# Patient Record
Sex: FEMALE | Race: ASIAN | NOT HISPANIC OR LATINO | Employment: FULL TIME | URBAN - METROPOLITAN AREA
[De-identification: names, ages, dates, MRNs, and addresses within clinical notes are randomized per-mention and may not be internally consistent; named-entity substitution may affect disease eponyms.]

---

## 2017-01-06 ENCOUNTER — ROUTINE PRENATAL (OUTPATIENT)
Dept: OBGYN | Facility: CLINIC | Age: 27
End: 2017-01-06
Payer: MEDICAID

## 2017-01-06 VITALS — WEIGHT: 211 LBS | DIASTOLIC BLOOD PRESSURE: 69 MMHG | BODY MASS INDEX: 34.07 KG/M2 | SYSTOLIC BLOOD PRESSURE: 125 MMHG

## 2017-01-06 DIAGNOSIS — O41.92X0: ICD-10-CM

## 2017-01-06 DIAGNOSIS — Z34.82 ENCOUNTER FOR SUPERVISION OF OTHER NORMAL PREGNANCY IN SECOND TRIMESTER: Primary | ICD-10-CM

## 2017-01-06 PROCEDURE — 90040 PR PRENATAL FOLLOW UP: CPT | Performed by: NURSE PRACTITIONER
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
## 2017-01-06 NOTE — Clinical Note
"Count Your Baby's Movements  Another step to a healthy delivery    A Epic Dress Re Test             Dept: [phone]    How Many Weeks Pregnant? 26w2d      Date to Begin Countin17              How to use this chart    One way for your physician to keep track of your baby's health is by knowing how often the baby moves (or \"kicks\") in your womb.  You can help your physician to do this by using this chart every day.    Every day, you should see how many hours it takes for your baby to move 10 times.  Start in the morning, as soon as you get up.    · First, write down the time your baby moves until you get to 10.  · Check off one box every time your baby moves until you get to 10.  · Write down the time you finished counting in the last column.  · Total how long it took to count up all 10 movements.  · Finally, fill in the box that shows how long this took.  After counting 10 movements, you no longer have to count any more that day.  The next morning, just start counting again as soon as you get up.    What should you call a \"movement\"?  It is hard to say, because it will feel different from one mother to another and from one pregnancy to the next.  The important thing is that you count the movements the same way throughout your pregnancy.  If you have more questions, you should ask your physician.    Count carefully every day!  SAMPLE:  Week 28    How many hours did it take to feel 10 movements?       Start  Time     1     2     3     4     5     6     7     8     9     10   Finish Time   Mon 8:20 ·  ·  ·  ·  ·  ·  ·  ·  ·  ·  11:40                  Sat               Sun                 IMPORTANT: You should contact your physician if it takes more than two hours for you to feel 10 movements.  Each morning, write down the time and start to count the movements of your baby.  Keep track by checking off one box every time you feel one movement.  When you " "have felt 10 \"kicks\", write down the time you finished counting in the last column.  Then fill in the   box (over the check monique) for the number of hours it took.  Be sure to read the complete instructions on the previous page.            "

## 2017-01-31 NOTE — PROGRESS NOTES
S:  Pt is  at 30w2d for Centering session #5.  Pt has no complaints.  Reports good FM.  Denies VB, LOF, RUCs or vaginal DC.    O:  Please see above vitals.        FHTs: 143        Fundal ht: 30        1hr      A:  IUP at 30w2d  Patient Active Problem List    Diagnosis Date Noted   • Amniotic fluid abnormality - RONEL 21.58cm at 22wk 2016   • Encounter for supervision of normal pregnancy in second trimester 2016   • Vitamin D deficiency 2016   • Tobacco use 2016        P:  1.  Declines BTL.          2.  Questions answered.          3.  Encouraged adequate water intake        4.  F/u 2wks Centering Session #6        5.  Continue FKCs.        6.  TDaP offered today        7.  1hr GTT, RPR, H/H wnl - pt notified        8.  F/u US for poly on 2/3 - today at 11am    Centering Topics Reviewed:  1.  Labor  2.  Birth facility  3.  Breathing  4.  Medications for labor & birth  5.  Early labor -- when to call  6.  Kick counts

## 2017-02-03 ENCOUNTER — APPOINTMENT (OUTPATIENT)
Dept: RADIOLOGY | Facility: IMAGING CENTER | Age: 27
End: 2017-02-03
Attending: NURSE PRACTITIONER
Payer: MEDICAID

## 2017-02-03 ENCOUNTER — ROUTINE PRENATAL (OUTPATIENT)
Dept: OBGYN | Facility: CLINIC | Age: 27
End: 2017-02-03
Payer: MEDICAID

## 2017-02-03 VITALS — WEIGHT: 220 LBS | DIASTOLIC BLOOD PRESSURE: 71 MMHG | BODY MASS INDEX: 35.53 KG/M2 | SYSTOLIC BLOOD PRESSURE: 125 MMHG

## 2017-02-03 DIAGNOSIS — Z34.82 ENCOUNTER FOR SUPERVISION OF OTHER NORMAL PREGNANCY IN SECOND TRIMESTER: ICD-10-CM

## 2017-02-03 DIAGNOSIS — O41.92X0: ICD-10-CM

## 2017-02-03 PROCEDURE — 90040 PR PRENATAL FOLLOW UP: CPT | Performed by: PHYSICIAN ASSISTANT

## 2017-02-03 PROCEDURE — 90715 TDAP VACCINE 7 YRS/> IM: CPT | Performed by: PHYSICIAN ASSISTANT

## 2017-02-03 PROCEDURE — 76816 OB US FOLLOW-UP PER FETUS: CPT | Mod: TC | Performed by: NURSE PRACTITIONER

## 2017-02-03 PROCEDURE — 90471 IMMUNIZATION ADMIN: CPT | Performed by: PHYSICIAN ASSISTANT

## 2017-02-03 NOTE — MR AVS SNAPSHOT
Kyara Olmstead Jones   2/3/2017 9:00 AM   Routine Prenatal   MRN: [id number]    Department[facility]  [facility]   Dept Phone:  [phone]    Description:  Female : 1990   Provider:  CEDRIC Lucia           Allergies as of 2/3/2017     No Known Allergies      You were diagnosed with     Encounter for supervision of other normal pregnancy in second trimester   [[id number]]         Vital Signs     Blood Pressure Weight Last Menstrual Period Smoking Status          125/71 mmHg 99.791 kg (220 lb) 2016 Former Smoker        Basic Information     Date Of Birth Sex Race Ethnicity Preferred Language    1990 Female  Non- English      Your appointments     2017  9:00 AM   Group Visit with CENTERING   [facility] ([facility]    [address] [place] [address] [address]  [place] [address]   [phone]            Mar 03, 2017  9:00 AM   Group Visit with CENTERING   [facility]    [address] [place] [address] [address]  [place] [address]   [phone]            Mar 17, 2017  9:00 AM   Group Visit with CENTERING   [facility]    [address] [place] [address] [address]  [place] [address]   [phone]            Mar 31, 2017  9:00 AM   Group Visit with CENTERING   [facility]    [address] [place] [address] [address]  [place] [address]   [phone]              Problem List              ICD-10-CM Priority Class Noted - Resolved    Tobacco use Z72.0   2016 - Present    Vitamin D deficiency E55.9   2016 - Present    Encounter for supervision of normal pregnancy in second trimester Z34.92   2016 - Present    Amniotic fluid abnormality - RONEL 21.58cm at 22wk O41.90X0   2016 - Present      Health Maintenance        Date Due Completion Dates    IMM HEP B VACCINE (1 of 3 - Primary Series) 1990 ---    IMM HEP A VACCINE (1 of 2 - Standard Series) 10/28/1992 ---    IMM HPV VACCINE (1 of 3 - Female 3 Dose Series) 10/28/2002 ---    IMM VARICELLA (CHICKENPOX) VACCINE  (1 of 2 - 2 Dose Adolescent Series) 10/28/2004 ---    IMM DTaP/Tdap/Td Vaccine (1 - Tdap) 10/28/2010 ---    IMM PNEUMOCOCCAL 19-64 (ADULT) MEDIUM RISK SERIES (1 of 1 - PPSV23) 10/28/2010 ---    IMM INFLUENZA (1) 9/1/2016 ---    PAP SMEAR 6/8/2019 6/8/2016            Current Immunizations     Tdap Vaccine 2/3/2017  9:12 AM      Below and/or attached are the medications your provider expects you to take. Review all of your home medications and newly ordered medications with your provider and/or pharmacist. Follow medication instructions as directed by your provider and/or pharmacist. Please keep your medication list with you and share with your provider. Update the information when medications are discontinued, doses are changed, or new medications (including over-the-counter products) are added; and carry medication information at all times in the event of emergency situations     Allergies:  No Known Allergies          Medications  Valid as of: February 03, 2017 - 12:59 PM    Generic Name Brand Name Tablet Size Instructions for use    Cholecalciferol (Cap) Cholecalciferol 1000 UNIT Take 1 Cap by mouth every day.        Prenatal MV-Min-Fe Fum-FA-DHA   Take  by mouth.        .                 Medicines prescribed today were sent to:     None      Medication refill instructions:       If your prescription bottle indicates you have medication refills left, it is not necessary to call your provider’s office. Please contact your pharmacy and they will refill your medication.    If your prescription bottle indicates you do not have any refills left, you may request refills at any time through one of the following ways: The online [employer] system (except Urgent Care), by calling your provider’s office, or by asking your pharmacy to contact your provider’s office with a refill request. Medication refills are processed only during regular business hours and may not be available until the next business day. Your provider may  request additional information or to have a follow-up visit with you prior to refilling your medication.   *Please Note: Medication refills are assigned a new Rx number when refilled electronically. Your pharmacy may indicate that no refills were authorized even though a new prescription for the same medication is available at the pharmacy. Please request the medicine by name with the pharmacy before contacting your provider for a refill.        Other Notes About Your Plan     BELLA Schaeffer Access Code: Activation code not generated  Current MyChart Status: Active

## 2017-02-13 NOTE — PROGRESS NOTES
S:  Pt is  at 32w2d for Centering session #6.  Pt has no complaints. US results d/w pt - need for NST 2x/wk starting today also d/w pt - pt happy with plan.  Reports good FM.  Denies VB, LOF, RUCs or vaginal DC.    O:  Please see above vitals.        FHTs: 134        Fundal ht: 35          A:  IUP at 32w2d  Patient Active Problem List    Diagnosis Date Noted   • Amniotic fluid abnormality - RONEL 21.58cm at 22wk 2016   • Encounter for supervision of normal pregnancy in second trimester 2016   • Vitamin D deficiency 2016   • Tobacco use 2016        P:  1.  Questions answered.          2.  Encouraged adequate water intake        3.  F/u 2wks Centering Session #7        4.  Continue FKCs.          5.  NST 2x/wk and IOL at 39 wk for Poly - start NST today    Centering Topics Reviewed:  1.  The Birth Experience

## 2017-02-17 ENCOUNTER — ROUTINE PRENATAL (OUTPATIENT)
Dept: OBGYN | Facility: CLINIC | Age: 27
End: 2017-02-17
Payer: MEDICAID

## 2017-02-17 VITALS — WEIGHT: 219.8 LBS | SYSTOLIC BLOOD PRESSURE: 122 MMHG | BODY MASS INDEX: 35.49 KG/M2 | DIASTOLIC BLOOD PRESSURE: 68 MMHG

## 2017-02-17 DIAGNOSIS — O41.92X0: ICD-10-CM

## 2017-02-17 DIAGNOSIS — Z34.82 ENCOUNTER FOR SUPERVISION OF OTHER NORMAL PREGNANCY IN SECOND TRIMESTER: ICD-10-CM

## 2017-02-17 LAB
NST ACOUSTIC STIMULATION: NORMAL
NST ACTION NECESSARY: NORMAL
NST ASSESSMENT: NORMAL
NST BASELINE: NORMAL
NST INDICATIONS: NORMAL
NST OTHER DATA: NORMAL
NST READ BY: NORMAL
NST RETURN: NORMAL
NST UTERINE ACTIVITY: NORMAL

## 2017-02-17 PROCEDURE — 90040 PR PRENATAL FOLLOW UP: CPT | Performed by: PHYSICIAN ASSISTANT

## 2017-02-17 PROCEDURE — 59025 FETAL NON-STRESS TEST: CPT | Performed by: PHYSICIAN ASSISTANT

## 2017-02-17 NOTE — MR AVS SNAPSHOT
Kyara Olmstead Jones   2017 9:00 AM   Routine Prenatal   MRN: [id number]    Department[facility]   Dept Phone:  [phone]    Description:  Female : 1990   Provider:  CEDRIC Lucia           Allergies as of 2017     No Known Allergies      You were diagnosed with     Amniotic fluid abnormality, second trimester, not applicable or unspecified fetus   [[id number]]       Encounter for supervision of other normal pregnancy in second trimester   [[id number]]         Vital Signs     Blood Pressure Weight Last Menstrual Period Smoking Status          122/68 mmHg 99.701 kg (219 lb 12.8 oz) 2016 Former Smoker        Basic Information     Date Of Birth Sex Race Ethnicity Preferred Language    1990 Female  Non- English      Your appointments     Mar 03, 2017  9:00 AM   Group Visit with CENTERING   [facility] ([facility])    [address] [place] [address] [address]  [facility] [address]   [phone]            Mar 17, 2017  9:00 AM   Group Visit with CENTERING   [facility] ([facility])    [address] [place] [address] [address]  [place] [address]   [phone]            Mar 31, 2017  9:00 AM   Group Visit with CENTERING   [facility] ([facility])    [address] [place] [address] [address]  [place] [address] [phone]              Problem List              ICD-10-CM Priority Class Noted - Resolved    Tobacco use Z72.0   2016 - Present    Vitamin D deficiency E55.9   2016 - Present    Encounter for supervision of normal pregnancy in second trimester Z34.92   2016 - Present    Amniotic fluid abnormality - RONEL 21.58cm at 22wk O41.90X0   2016 - Present      Health Maintenance        Date Due Completion Dates    IMM HEP B VACCINE (1 of 3 - Primary Series) 1990 ---    IMM HEP A VACCINE (1 of 2 - Standard Series) 10/28/1992 ---    IMM HPV VACCINE (1 of 3 - Female 3 Dose Series) 10/28/2002 ---    IMM VARICELLA (CHICKENPOX) VACCINE (1 of 2 - 2 Dose Adolescent Series)  10/28/2004 ---    IMM PNEUMOCOCCAL 19-64 (ADULT) MEDIUM RISK SERIES (1 of 1 - PPSV23) 10/28/2010 ---    IMM INFLUENZA (1) 9/1/2016 ---    PAP SMEAR 6/8/2019 6/8/2016    IMM DTaP/Tdap/Td Vaccine (2 - Td) 2/3/2027 2/3/2017            Current Immunizations     Tdap Vaccine 2/3/2017  9:12 AM      Below and/or attached are the medications your provider expects you to take. Review all of your home medications and newly ordered medications with your provider and/or pharmacist. Follow medication instructions as directed by your provider and/or pharmacist. Please keep your medication list with you and share with your provider. Update the information when medications are discontinued, doses are changed, or new medications (including over-the-counter products) are added; and carry medication information at all times in the event of emergency situations     Allergies:  No Known Allergies          Medications  Valid as of: February 17, 2017 - 11:42 AM    Generic Name Brand Name Tablet Size Instructions for use    Cholecalciferol (Cap) Cholecalciferol 1000 UNIT Take 1 Cap by mouth every day.        Prenatal MV-Min-Fe Fum-FA-DHA   Take  by mouth.        .                 Medicines prescribed today were sent to:     None      Medication refill instructions:       If your prescription bottle indicates you have medication refills left, it is not necessary to call your provider’s office. Please contact your pharmacy and they will refill your medication.    If your prescription bottle indicates you do not have any refills left, you may request refills at any time through one of the following ways: The online [employer] system (except Urgent Care), by calling your provider’s office, or by asking your pharmacy to contact your provider’s office with a refill request. Medication refills are processed only during regular business hours and may not be available until the next business day. Your provider may request additional information or to  have a follow-up visit with you prior to refilling your medication.   *Please Note: Medication refills are assigned a new Rx number when refilled electronically. Your pharmacy may indicate that no refills were authorized even though a new prescription for the same medication is available at the pharmacy. Please request the medicine by name with the pharmacy before contacting your provider for a refill.        Other Notes About Your Plan     BELLA Schaeffer Access Code: Activation code not generated  Current [employer] Status: Active

## 2017-02-17 NOTE — MR AVS SNAPSHOT
Kyara Olmstead Jones   2017 11:00 AM   Routine Prenatal   MRN: [id number]    [facility]   Dept Phone:  [phone]    Description:  Female : 1990   Provider:  CEDRIC Lucia           Allergies as of 2017     No Known Allergies      You were diagnosed with     Amniotic fluid abnormality, second trimester, not applicable or unspecified fetus   [3116662]         Vital Signs     Last Menstrual Period Smoking Status                2016 Former Smoker          Basic Information     Date Of Birth Sex Race Ethnicity Preferred Language    1990 Female  Non- English      Your appointments     Mar 03, 2017  9:00 AM   [facility] Visit with CENTERING   [facility] ([facility])    [address] [facility] [facility] [address]  [place] [id number] [phone]            Mar 17, 2017  9:00 AM   Group Visit with CENTERING   [facility] ([facility])    [address] [facility] [address]  [place] [address] [phone]            Mar 31, 2017  9:00 AM   Group Visit with CENTERING   [facility] ([facility])    [address] [facility] [address]  [place] [address] [phone]              Problem List              ICD-10-CM Priority Class Noted - Resolved    Tobacco use Z72.0   2016 - Present    Vitamin D deficiency E55.9   2016 - Present    Encounter for supervision of normal pregnancy in second trimester Z34.92   2016 - Present    Amniotic fluid abnormality - RONEL 21.58cm at 22wk O41.90X0   2016 - Present      Health Maintenance        Date Due Completion Dates    IMM HEP B VACCINE (1 of 3 - Primary Series) 1990 ---    IMM HEP A VACCINE (1 of 2 - Standard Series) 10/28/1992 ---    IMM HPV VACCINE (1 of 3 - Female 3 Dose Series) 10/28/2002 ---    IMM VARICELLA (CHICKENPOX) VACCINE (1 of 2 - 2 Dose Adolescent Series) 10/28/2004 ---    IMM PNEUMOCOCCAL 19-64 (ADULT) MEDIUM RISK SERIES (1 of 1 - PPSV23) 10/28/2010 ---    IMM INFLUENZA (1) 2016 ---    PAP SMEAR  6/8/2019 6/8/2016    IMM DTaP/Tdap/Td Vaccine (2 - Td) 2/3/2027 2/3/2017            Results       Current Immunizations     Tdap Vaccine 2/3/2017  9:12 AM      Below and/or attached are the medications your provider expects you to take. Review all of your home medications and newly ordered medications with your provider and/or pharmacist. Follow medication instructions as directed by your provider and/or pharmacist. Please keep your medication list with you and share with your provider. Update the information when medications are discontinued, doses are changed, or new medications (including over-the-counter products) are added; and carry medication information at all times in the event of emergency situations     Allergies:  No Known Allergies          Medications  Valid as of: February 17, 2017 - 11:42 AM    Generic Name Brand Name Tablet Size Instructions for use    Cholecalciferol (Cap) Cholecalciferol 1000 UNIT Take 1 Cap by mouth every day.        Prenatal MV-Min-Fe Fum-FA-DHA   Take  by mouth.        .                 Medicines prescribed today were sent to:     None      Medication refill instructions:       If your prescription bottle indicates you have medication refills left, it is not necessary to call your provider’s office. Please contact your pharmacy and they will refill your medication.    If your prescription bottle indicates you do not have any refills left, you may request refills at any time through one of the following ways: The online [employer] system (except Urgent Care), by calling your provider’s office, or by asking your pharmacy to contact your provider’s office with a refill request. Medication refills are processed only during regular business hours and may not be available until the next business day. Your provider may request additional information or to have a follow-up visit with you prior to refilling your medication.   *Please Note: Medication refills are assigned a new Rx number  when refilled electronically. Your pharmacy may indicate that no refills were authorized even though a new prescription for the same medication is available at the pharmacy. Please request the medicine by name with the pharmacy before contacting your provider for a refill.        Other Notes About Your Plan     BELLA Schaeffer Access Code: Activation code not generated  Current MyChart Status: Active

## 2017-02-21 ENCOUNTER — ROUTINE PRENATAL (OUTPATIENT)
Dept: OBGYN | Facility: CLINIC | Age: 27
End: 2017-02-21
Payer: MEDICAID

## 2017-02-21 DIAGNOSIS — O40.9XX0 POLYHYDRAMNIOS, UNSPECIFIED TRIMESTER, NOT APPLICABLE OR UNSPECIFIED FETUS: ICD-10-CM

## 2017-02-21 LAB
NST ACOUSTIC STIMULATION: NORMAL
NST ACTION NECESSARY: NORMAL
NST ASSESSMENT: NORMAL
NST BASELINE: 130
NST INDICATIONS: NORMAL
NST OTHER DATA: NORMAL
NST READ BY: NORMAL
NST RETURN: NORMAL
NST UTERINE ACTIVITY: NORMAL

## 2017-02-21 PROCEDURE — 59025 FETAL NON-STRESS TEST: CPT | Performed by: NURSE PRACTITIONER

## 2017-02-21 PROCEDURE — 90040 PR PRENATAL FOLLOW UP: CPT | Performed by: NURSE PRACTITIONER

## 2017-02-21 NOTE — MR AVS SNAPSHOT
Kyara Olmstead Jones   2017 2:00 PM   Routine Prenatal   MRN: [id number]    [facility]   Dept Phone:  [phone]    Description:  Female : 1990   Provider:  Roz Song D.N.P.           Allergies as of 2017     No Known Allergies      You were diagnosed with     Polyhydramnios, unspecified trimester, not applicable or unspecified fetus   [[id number]]         Vital Signs     Last Menstrual Period Smoking Status                2016 Former Smoker          Basic Information     Date Of Birth Sex Race Ethnicity Preferred Language    1990 Female  Non- English      Your appointments     2017  2:00 PM   Fetal Non-Stress Test with PC NST   [facility] ([facility])    [address] [place] [address] [address]  [place] [address]   [phone]            2017  2:00 PM   Fetal Non-Stress Test with PC NST   [facility] [address]  [place] [address]   [phone]            Mar 03, 2017  9:00 AM   Group Visit with CENTERING   [facility] ([facility])    [address] [place] [address] [address]  [place] [address]   [phone]            Mar 03, 2017 11:00 AM   Fetal Non-Stress Test with PC NST   [facility] ([facility])    [address] [place] [address] [address]  [place] [address]   [phone]            Mar 17, 2017  9:00 AM   Group Visit with CENTERING   [facility] ([address]  [place] [address]   [phone]            Mar 31, 2017  9:00 AM   Group Visit with CENTERING   [facility] ([facility])    [address] [place] [address] [address]  [place] [address]   [phone]              Problem List              ICD-10-CM Priority Class Noted - Resolved    Tobacco use Z72.0   2016 - Present    Vitamin D deficiency E55.9   2016 - Present    Encounter for supervision of normal pregnancy in second trimester Z34.92   2016 - Present    Amniotic fluid abnormality - RONEL 21.58cm at 22wk O41.90X0   2016 -  Present      Health Maintenance        Date Due Completion Dates    IMM HEP B VACCINE (1 of 3 - Primary Series) 1990 ---    IMM HEP A VACCINE (1 of 2 - Standard Series) 10/28/1992 ---    IMM HPV VACCINE (1 of 3 - Female 3 Dose Series) 10/28/2002 ---    IMM VARICELLA (CHICKENPOX) VACCINE (1 of 2 - 2 Dose Adolescent Series) 10/28/2004 ---    IMM PNEUMOCOCCAL 19-64 (ADULT) MEDIUM RISK SERIES (1 of 1 - PPSV23) 10/28/2010 ---    IMM INFLUENZA (1) 9/1/2016 ---    PAP SMEAR 6/8/2019 6/8/2016    IMM DTaP/Tdap/Td Vaccine (2 - Td) 2/3/2027 2/3/2017            Results     POCT Fetal Nonstress Test      Component    NST Indications    polyhydramnios    NST Baseline    130    NST Uterine Activity    none    NST Acoustic Stimulation    none    NST Assessment    Category one, pos accels, no decels, moderate variability.    NST Action Necessary    NST Other Data    NST Return    NST Read By                        Current Immunizations     Tdap Vaccine 2/3/2017  9:12 AM      Below and/or attached are the medications your provider expects you to take. Review all of your home medications and newly ordered medications with your provider and/or pharmacist. Follow medication instructions as directed by your provider and/or pharmacist. Please keep your medication list with you and share with your provider. Update the information when medications are discontinued, doses are changed, or new medications (including over-the-counter products) are added; and carry medication information at all times in the event of emergency situations     Allergies:  No Known Allergies          Medications  Valid as of: February 21, 2017 -  3:27 PM    Generic Name Brand Name Tablet Size Instructions for use    Cholecalciferol (Cap) Cholecalciferol 1000 UNIT Take 1 Cap by mouth every day.        Prenatal MV-Min-Fe Fum-FA-DHA   Take  by mouth.        .                 Medicines prescribed today were sent to:     None      Medication refill instructions:          If your prescription bottle indicates you have medication refills left, it is not necessary to call your provider’s office. Please contact your pharmacy and they will refill your medication.    If your prescription bottle indicates you do not have any refills left, you may request refills at any time through one of the following ways: The online [employer] system (except Urgent Care), by calling your provider’s office, or by asking your pharmacy to contact your provider’s office with a refill request. Medication refills are processed only during regular business hours and may not be available until the next business day. Your provider may request additional information or to have a follow-up visit with you prior to refilling your medication.   *Please Note: Medication refills are assigned a new Rx number when refilled electronically. Your pharmacy may indicate that no refills were authorized even though a new prescription for the same medication is available at the pharmacy. Please request the medicine by name with the pharmacy before contacting your provider for a refill.        Other Notes About Your Plan     BELLA Saucedo           [employer] Access Code: Activation code not generated  Current [employer] Status: Active

## 2017-02-24 ENCOUNTER — ROUTINE PRENATAL (OUTPATIENT)
Dept: OBGYN | Facility: CLINIC | Age: 27
End: 2017-02-24
Payer: MEDICAID

## 2017-02-24 DIAGNOSIS — O40.3XX0 POLYHYDRAMNIOS, THIRD TRIMESTER, NOT APPLICABLE OR UNSPECIFIED FETUS: ICD-10-CM

## 2017-02-24 PROCEDURE — 59025 FETAL NON-STRESS TEST: CPT | Performed by: OBSTETRICS & GYNECOLOGY

## 2017-02-24 NOTE — MR AVS SNAPSHOT
Kyara Olmstead Jones   2017 2:00 PM   Routine Prenatal   MRN: [id number]    [facility]   Dept Phone:  [phone]    Description:  Female : 1990   Provider:  Keke Huntley M.D.           Allergies as of 2017     No Known Allergies      You were diagnosed with     Polyhydramnios, third trimester, not applicable or unspecified fetus   [[id number]]         Vital Signs     Last Menstrual Period Smoking Status                2016 Former Smoker          Basic Information     Date Of Birth Sex Race Ethnicity Preferred Language    1990 Female  Non- English      Your appointments     2017  2:00 PM   Fetal Non-Stress Test with PC NST   [facility] ([facility])    [address] [place] [address] [address]  [place] [address]   [phone]            Mar 03, 2017  9:00 AM   Group Visit with CENTERING   [facility] ([facility])    [address] [place] [address] [address]  [place] [address]   [phone]            Mar 03, 2017 11:00 AM   Fetal Non-Stress Test with PC NST   [facility] ([facility])    [address] [place] [address] [address]  [place] [address]   [phone]            Mar 17, 2017  9:00 AM   Group Visit with CENTERING   [facility] ([facility])    [address] [place] [address] [address]  [place] [address]   [phone]            Mar 31, 2017  9:00 AM   Group Visit with CENTERING   [facility] ([facility])    [address] [place] [address] [address]  [place] [address]   [phone]              Problem List              ICD-10-CM Priority Class Noted - Resolved    Tobacco use Z72.0   2016 - Present    Vitamin D deficiency E55.9   2016 - Present    Encounter for supervision of normal pregnancy in second trimester Z34.92   2016 - Present    Amniotic fluid abnormality - RONEL 21.58cm at 22wk O41.90X0   2016 - Present      Health Maintenance        Date Due Completion Dates    IMM HEP B VACCINE (1 of 3 - Primary Series) 1990 ---    IMM HEP A VACCINE (1 of 2 - Standard Series)  10/28/1992 ---    IMM HPV VACCINE (1 of 3 - Female 3 Dose Series) 10/28/2002 ---    IMM VARICELLA (CHICKENPOX) VACCINE (1 of 2 - 2 Dose Adolescent Series) 10/28/2004 ---    IMM PNEUMOCOCCAL 19-64 (ADULT) MEDIUM RISK SERIES (1 of 1 - PPSV23) 10/28/2010 ---    IMM INFLUENZA (1) 9/1/2016 ---    PAP SMEAR 6/8/2019 6/8/2016    IMM DTaP/Tdap/Td Vaccine (2 - Td) 2/3/2027 2/3/2017            Results     POCT NST      Component    NST Indications    Polyhydramnios    NST Baseline    120s    NST Uterine Activity    none    NST Acoustic Stimulation    NST Assessment    Cat 1    NST Action Necessary    NST Other Data    NST Return    twice weekly    NST Read By    Yuko                        Current Immunizations     Tdap Vaccine 2/3/2017  9:12 AM      Below and/or attached are the medications your provider expects you to take. Review all of your home medications and newly ordered medications with your provider and/or pharmacist. Follow medication instructions as directed by your provider and/or pharmacist. Please keep your medication list with you and share with your provider. Update the information when medications are discontinued, doses are changed, or new medications (including over-the-counter products) are added; and carry medication information at all times in the event of emergency situations     Allergies:  No Known Allergies          Medications  Valid as of: February 24, 2017 -  4:17 PM    Generic Name Brand Name Tablet Size Instructions for use    Cholecalciferol (Cap) Cholecalciferol 1000 UNIT Take 1 Cap by mouth every day.        Prenatal MV-Min-Fe Fum-FA-DHA   Take  by mouth.        .                 Medicines prescribed today were sent to:     None      Medication refill instructions:       If your prescription bottle indicates you have medication refills left, it is not necessary to call your provider’s office. Please contact your pharmacy and they will refill your medication.    If your prescription bottle  indicates you do not have any refills left, you may request refills at any time through one of the following ways: The online [employer] system (except Urgent Care), by calling your provider’s office, or by asking your pharmacy to contact your provider’s office with a refill request. Medication refills are processed only during regular business hours and may not be available until the next business day. Your provider may request additional information or to have a follow-up visit with you prior to refilling your medication.   *Please Note: Medication refills are assigned a new Rx number when refilled electronically. Your pharmacy may indicate that no refills were authorized even though a new prescription for the same medication is available at the pharmacy. Please request the medicine by name with the pharmacy before contacting your provider for a refill.        Other Notes About Your Plan     BELLA Saucedo           [employer] Access Code: Activation code not generated  Current [employer] Status: Active

## 2017-02-28 ENCOUNTER — ROUTINE PRENATAL (OUTPATIENT)
Dept: OBGYN | Facility: CLINIC | Age: 27
End: 2017-02-28
Payer: MEDICAID

## 2017-02-28 DIAGNOSIS — O41.92X0: ICD-10-CM

## 2017-02-28 PROCEDURE — 59025 FETAL NON-STRESS TEST: CPT | Performed by: OBSTETRICS & GYNECOLOGY

## 2017-02-28 NOTE — MR AVS SNAPSHOT
Kyara Patelr   2017 11:00 AM   Routine Prenatal   MRN: [id number]    [facility]   Dept Phone:  [phone]    Description:  Female : 1990   Provider:  SHELDON CHING           Allergies as of 2017     No Known Allergies      You were diagnosed with     Amniotic fluid abnormality, second trimester, not applicable or unspecified fetus   [[id number]]         Vital Signs     Last Menstrual Period Smoking Status                2016 Former Smoker          Basic Information     Date Of Birth Sex Race Ethnicity Preferred Language    1990 Female  Non- English      Your appointments     Mar 03, 2017  9:00 AM   Group Visit with CENTERING   [facility] ([facility])    [address] [place] [address] [address]  [place] [address]   [phone]            Mar 03, 2017 11:00 AM   Fetal Non-Stress Test with SHELDON CHING   [facility] ([facility])    [address] [place] [address] [address]  [place] [address]   [phone]            Mar 17, 2017  9:00 AM   Group Visit with CENTERING   [facility] ([facility])    [address] [place] [address] [address]  [place] [address]   [phone]            Mar 31, 2017  9:00 AM   Group Visit with CENTERING   [facility] ([facility])    [address] [place] [address] [address]  [place] [address]   [phone]              Problem List              ICD-10-CM Priority Class Noted - Resolved    Tobacco use Z72.0   2016 - Present    Vitamin D deficiency E55.9   2016 - Present    Encounter for supervision of normal pregnancy in second trimester Z34.92   2016 - Present    Amniotic fluid abnormality - RONEL 21.58cm at 22wk O41.90X0   2016 - Present      Health Maintenance        Date Due Completion Dates    IMM HEP B VACCINE (1 of 3 - Primary Series) 1990 ---    IMM HEP A VACCINE (1 of 2 - Standard Series) 10/28/1992 ---    IMM HPV VACCINE (1 of 3 - Female 3 Dose Series) 10/28/2002 ---    IMM VARICELLA (CHICKENPOX) VACCINE (1 of 2 - 2 Dose Adolescent Series)  10/28/2004 ---    IMM PNEUMOCOCCAL 19-64 (ADULT) MEDIUM RISK SERIES (1 of 1 - PPSV23) 10/28/2010 ---    IMM INFLUENZA (1) 9/1/2016 ---    PAP SMEAR 6/8/2019 6/8/2016    IMM DTaP/Tdap/Td Vaccine (2 - Td) 2/3/2027 2/3/2017            Results       Current Immunizations     Tdap Vaccine 2/3/2017  9:12 AM      Below and/or attached are the medications your provider expects you to take. Review all of your home medications and newly ordered medications with your provider and/or pharmacist. Follow medication instructions as directed by your provider and/or pharmacist. Please keep your medication list with you and share with your provider. Update the information when medications are discontinued, doses are changed, or new medications (including over-the-counter products) are added; and carry medication information at all times in the event of emergency situations     Allergies:  No Known Allergies          Medications  Valid as of: February 28, 2017 - 11:32 AM    Generic Name Brand Name Tablet Size Instructions for use    Cholecalciferol (Cap) Cholecalciferol 1000 UNIT Take 1 Cap by mouth every day.        Prenatal MV-Min-Fe Fum-FA-DHA   Take  by mouth.        .                 Medicines prescribed today were sent to:     None      Medication refill instructions:       If your prescription bottle indicates you have medication refills left, it is not necessary to call your provider’s office. Please contact your pharmacy and they will refill your medication.    If your prescription bottle indicates you do not have any refills left, you may request refills at any time through one of the following ways: The online [employer] system (except Urgent Care), by calling your provider’s office, or by asking your pharmacy to contact your provider’s office with a refill request. Medication refills are processed only during regular business hours and may not be available until the next business day. Your provider may request additional  information or to have a follow-up visit with you prior to refilling your medication.   *Please Note: Medication refills are assigned a new Rx number when refilled electronically. Your pharmacy may indicate that no refills were authorized even though a new prescription for the same medication is available at the pharmacy. Please request the medicine by name with the pharmacy before contacting your provider for a refill.        Other Notes About Your Plan     BELLA Schaeffer Access Code: Activation code not generated  Current [employer] Status: Active

## 2017-03-03 ENCOUNTER — ROUTINE PRENATAL (OUTPATIENT)
Dept: OBGYN | Facility: CLINIC | Age: 27
End: 2017-03-03
Payer: MEDICAID

## 2017-03-03 VITALS — WEIGHT: 221 LBS | BODY MASS INDEX: 35.69 KG/M2 | DIASTOLIC BLOOD PRESSURE: 74 MMHG | SYSTOLIC BLOOD PRESSURE: 135 MMHG

## 2017-03-03 DIAGNOSIS — Z34.82 ENCOUNTER FOR SUPERVISION OF OTHER NORMAL PREGNANCY IN SECOND TRIMESTER: Primary | ICD-10-CM

## 2017-03-03 DIAGNOSIS — O41.92X0: ICD-10-CM

## 2017-03-03 DIAGNOSIS — O40.3XX0 POLYHYDRAMNIOS, THIRD TRIMESTER, NOT APPLICABLE OR UNSPECIFIED FETUS: ICD-10-CM

## 2017-03-03 PROCEDURE — 90040 PR PRENATAL FOLLOW UP: CPT | Performed by: NURSE PRACTITIONER

## 2017-03-03 PROCEDURE — 59025 FETAL NON-STRESS TEST: CPT | Performed by: NURSE PRACTITIONER

## 2017-03-03 NOTE — MR AVS SNAPSHOT
Kyara Lockeuilar   3/3/2017 8:30 AM   Routine Prenatal   MRN: [id number]    [facility]   Dept Phone:  [phone]    Description:  Female : 1990   Provider:  Lacy Eaton C.N.M.           Allergies as of 3/3/2017     No Known Allergies      You were diagnosed with     Polyhydramnios, third trimester, not applicable or unspecified fetus   [[id number]]         Vital Signs     Last Menstrual Period Smoking Status                2016 Former Smoker          Basic Information     Date Of Birth Sex Race Ethnicity Preferred Language    1990 Female  Non- English      Your appointments     Mar 17, 2017  9:00 AM   Group Visit with CENTERING   [facility] ([facility] [place])    [address]  [place] [address] [phone]            Mar 31, 2017  9:00 AM   Group Visit with CENTERING   [facility] ([facility] [place])    [address]  [place] [address] [phone]              Problem List              ICD-10-CM Priority Class Noted - Resolved    Tobacco use Z72.0   2016 - Present    Vitamin D deficiency E55.9   2016 - Present    Encounter for supervision of normal pregnancy in second trimester Z34.92   2016 - Present    Amniotic fluid abnormality - RONEL 21.58cm at 22wk O41.90X0   2016 - Present      Health Maintenance        Date Due Completion Dates    IMM HEP B VACCINE (1 of 3 - Primary Series) 1990 ---    IMM HEP A VACCINE (1 of 2 - Standard Series) 10/28/1992 ---    IMM HPV VACCINE (1 of 3 - Female 3 Dose Series) 10/28/2002 ---    IMM VARICELLA (CHICKENPOX) VACCINE (1 of 2 - 2 Dose Adolescent Series) 10/28/2004 ---    IMM PNEUMOCOCCAL 19-64 (ADULT) MEDIUM RISK SERIES (1 of 1 - PPSV23) 10/28/2010 ---    IMM INFLUENZA (1) 2016 ---    PAP SMEAR 2019    IMM DTaP/Tdap/Td Vaccine (2 - Td) 2/3/2027 2/3/2017            Results     POCT Fetal Nonstress Test      Component    NST Indications    Polyhydramnios    NST Baseline    130s    NST Uterine Activity    Quiet    NST Acoustic Stimulation    None    NST Assessment    Reactive NST Cat I FHTs +accels -decels mod variability    NST Action Necessary    NST Other Data    NST Return    Cont 2x/wk NSTs 2wk Centering    NST Read By    [facility]                        Current Immunizations     Tdap Vaccine 2/3/2017  9:12 AM      Below and/or attached are the medications your provider expects you to take. Review all of your home medications and newly ordered medications with your provider and/or pharmacist. Follow medication instructions as directed by your provider and/or pharmacist. Please keep your medication list with you and share with your provider. Update the information when medications are discontinued, doses are changed, or new medications (including over-the-counter products) are added; and carry medication information at all times in the event of emergency situations     Allergies:  No Known Allergies          Medications  Valid as of: March 03, 2017 - 11:11 AM    Generic Name Brand Name Tablet Size Instructions for use    Cholecalciferol (Cap) Cholecalciferol 1000 UNIT Take 1 Cap by mouth every day.        Prenatal MV-Min-Fe Fum-FA-DHA   Take  by mouth.        .                 Medicines prescribed today were sent to:     None      Medication refill instructions:       If your prescription bottle indicates you have medication refills left, it is not necessary to call your provider’s office. Please contact your pharmacy and they will refill your medication.    If your prescription bottle indicates you do not have any refills left, you may request refills at any time through one of the following ways: The online [employer] system (except Urgent Care), by calling your provider’s office, or by asking your pharmacy to contact your provider’s office with a refill request. Medication refills are processed only during regular business hours and may not be available  until the next business day. Your provider may request additional information or to have a follow-up visit with you prior to refilling your medication.   *Please Note: Medication refills are assigned a new Rx number when refilled electronically. Your pharmacy may indicate that no refills were authorized even though a new prescription for the same medication is available at the pharmacy. Please request the medicine by name with the pharmacy before contacting your provider for a refill.        Other Notes About Your Plan     BELLA Schaeffer Access Code: Activation code not generated  Current [employer]sat Status: Active

## 2017-03-03 NOTE — PROGRESS NOTES
S:  Pt is  at 34w2d for Centering session #7.  No c/o.  Reports good FM.  Denies VB, LOF, RUCs or vaginal DC.    O:  Please see above vitals.        FHTs: 130        Fundal ht: 36        NST obtained: Baseline 130s +accels -decels mod variability        [place]: quiet    A:  IUP at 34w2d  NST reactive Cat I FHTs  Patient Active Problem List    Diagnosis Date Noted   • Amniotic fluid abnormality - RONEL 21.58cm at 22wk 2016   • Encounter for supervision of normal pregnancy in second trimester 2016   • Vitamin D deficiency 2016   • Tobacco use 2016        P:  1.  Questions answered.          2.  Encouraged adequate water intake        3.  GBS @ 35wks        4.  F/u 2wks Centering Session #8        5.  Cont 2x/wk NSTs.     Centering Topics Reviewed:  1.  The 's first days  2.  Planning pediatric care  3.  Caring for your baby  4.  Circumcision  5.  Brothers & sisters  6.  [employer] -- when to call

## 2017-03-03 NOTE — PATIENT INSTRUCTIONS
P:  1.  Questions answered.          2.  Encouraged adequate water intake        3.  GBS @ 35wks        4.  F/u 2wks Centering Session #8        5.  Cont 2x/wk NSTs.     Centering Topics Reviewed:  1.  The 's first days  2.  Planning pediatric care  3.  Caring for your baby  4.  Circumcision  5.  Brothers & sisters  6.   -- when to call

## 2017-03-03 NOTE — MR AVS SNAPSHOT
Kyara Lockeuilar   3/3/2017 9:00 AM   Routine Prenatal   MRN: [id number]    [facility]   Dept Phone:  [phone]    Description:  Female : 1990   Provider:  Lacy Eaton C.N.M.           Allergies as of 3/3/2017     No Known Allergies      You were diagnosed with     Encounter for supervision of other normal pregnancy in second trimester   [[id number]]  -  Primary     Amniotic fluid abnormality, second trimester, not applicable or unspecified fetus   [[id number]]         Vital Signs     Blood Pressure Weight Last Menstrual Period Smoking Status          135/74 mmHg 100.245 kg (221 lb) 2016 Former Smoker        Basic Information     Date Of Birth Sex Race Ethnicity Preferred Language    1990 Female  Non- English      Your appointments     Mar 17, 2017  9:00 AM   Group Visit with CENTERING   [facility] ([facility])    [address]  [place] [id number] [phone]            Mar 31, 2017  9:00 AM   Group Visit with CENTERING   [facility] ([facility])    [address] [place] [address] [address]  [place] [id number] [phone]              Problem List              ICD-10-CM Priority Class Noted - Resolved    Tobacco use Z72.0   2016 - Present    Vitamin D deficiency E55.9   2016 - Present    Encounter for supervision of normal pregnancy in second trimester Z34.92   2016 - Present    Amniotic fluid abnormality - RONEL 21.58cm at 22wk O41.90X0   2016 - Present      Health Maintenance        Date Due Completion Dates    IMM HEP B VACCINE (1 of 3 - Primary Series) 1990 ---    IMM HEP A VACCINE (1 of 2 - Standard Series) 10/28/1992 ---    IMM HPV VACCINE (1 of 3 - Female 3 Dose Series) 10/28/2002 ---    IMM VARICELLA (CHICKENPOX) VACCINE (1 of 2 - 2 Dose Adolescent Series) 10/28/2004 ---    IMM PNEUMOCOCCAL 19-64 (ADULT) MEDIUM RISK SERIES (1 of 1 - PPSV23) 10/28/2010 ---    IMM INFLUENZA (1) 2016 ---    PAP SMEAR  6/8/2019 6/8/2016    IMM DTaP/Tdap/Td Vaccine (2 - Td) 2/3/2027 2/3/2017            Current Immunizations     Tdap Vaccine 2/3/2017  9:12 AM      Below and/or attached are the medications your provider expects you to take. Review all of your home medications and newly ordered medications with your provider and/or pharmacist. Follow medication instructions as directed by your provider and/or pharmacist. Please keep your medication list with you and share with your provider. Update the information when medications are discontinued, doses are changed, or new medications (including over-the-counter products) are added; and carry medication information at all times in the event of emergency situations     Allergies:  No Known Allergies          Medications  Valid as of: March 03, 2017 - 12:41 PM    Generic Name Brand Name Tablet Size Instructions for use    Cholecalciferol (Cap) Cholecalciferol 1000 UNIT Take 1 Cap by mouth every day.        Prenatal MV-Min-Fe Fum-FA-DHA   Take  by mouth.        .                 Medicines prescribed today were sent to:     None      Medication refill instructions:       If your prescription bottle indicates you have medication refills left, it is not necessary to call your provider’s office. Please contact your pharmacy and they will refill your medication.    If your prescription bottle indicates you do not have any refills left, you may request refills at any time through one of the following ways: The online [employer] system (except Urgent Care), by calling your provider’s office, or by asking your pharmacy to contact your provider’s office with a refill request. Medication refills are processed only during regular business hours and may not be available until the next business day. Your provider may request additional information or to have a follow-up visit with you prior to refilling your medication.   *Please Note: Medication refills are assigned a new Rx number when refilled  electronically. Your pharmacy may indicate that no refills were authorized even though a new prescription for the same medication is available at the pharmacy. Please request the medicine by name with the pharmacy before contacting your provider for a refill.        Instructions    P:  1.  Questions answered.          2.  Encouraged adequate water intake        3.  GBS @ 35wks        4.  F/u 2wks Centering Session #8        5.  Cont 2x/wk NSTs.     Centering Topics Reviewed:  1.  The 's first days  2.  Planning pediatric care  3.  Caring for your baby  4.  Circumcision  5.  Brothers & sisters  6.   -- when to call       Other Notes About Your Plan     BELLA Schaeffer Access Code: Activation code not generated  Current [employer] Status: Active

## 2017-03-10 ENCOUNTER — ROUTINE PRENATAL (OUTPATIENT)
Dept: OBGYN | Facility: CLINIC | Age: 27
End: 2017-03-10
Payer: MEDICAID

## 2017-03-10 DIAGNOSIS — O40.9XX0 POLYHYDRAMNIOS, UNSPECIFIED TRIMESTER, NOT APPLICABLE OR UNSPECIFIED FETUS: ICD-10-CM

## 2017-03-10 LAB
NST ACOUSTIC STIMULATION: NO
NST ACTION NECESSARY: NORMAL
NST ASSESSMENT: NORMAL
NST BASELINE: 120
NST INDICATIONS: NORMAL
NST OTHER DATA: NORMAL
NST READ BY: NORMAL
NST RETURN: NORMAL
NST UTERINE ACTIVITY: NORMAL

## 2017-03-10 PROCEDURE — 59025 FETAL NON-STRESS TEST: CPT | Performed by: OBSTETRICS & GYNECOLOGY

## 2017-03-10 NOTE — MR AVS SNAPSHOT
Kyaar Huertalar   3/10/2017 1:30 PM   Routine Prenatal   MRN: [id number]    [facility]   Dept Phone:  [phone]    Description:  Female : 1990   Provider:  Pushpa Eddy M.D.           Allergies as of 3/10/2017     No Known Allergies      You were diagnosed with     Polyhydramnios, unspecified trimester, not applicable or unspecified fetus   [[id number]]         Vital Signs     Last Menstrual Period Smoking Status                2016 Former Smoker          Basic Information     Date Of Birth Sex Race Ethnicity Preferred Language    1990 Female  Non- English      Your appointments     Mar 13, 2017  2:00 PM   Fetal Non-Stress Test with PC NST   [facility])    [address] [place] [address] [address]  [place] [address]   [phone]            Mar 17, 2017  8:00 AM   Fetal Non-Stress Test with PC NST   [facility])    [address] [place] [address] [address]  [place] [address]   [phone]            Mar 17, 2017  9:00 AM   Group Visit with CENTERING   [facility] ([facility] [place])    [address] [place] [address] [address]  [place] [address]   [phone]            Mar 31, 2017  9:00 AM   Group Visit with CENTERING   [facility] [place] ([facility] [place])    [address] [place] [address] [address]  [place] [address]   [phone]              Problem List              ICD-10-CM Priority Class Noted - Resolved    Tobacco use Z72.0   2016 - Present    Vitamin D deficiency E55.9   2016 - Present    Encounter for supervision of normal pregnancy in second trimester Z34.92   2016 - Present    Amniotic fluid abnormality - RONEL 21.58cm at 22wk O41.90X0   2016 - Present      Health Maintenance        Date Due Completion Dates    IMM HEP B VACCINE (1 of 3 - Primary Series) 1990 ---    IMM HEP A VACCINE (1 of 2 - Standard Series) 10/28/1992 ---    IMM HPV VACCINE (1 of 3 - Female 3 Dose Series) 10/28/2002 ---    IMM VARICELLA (CHICKENPOX) VACCINE (1 of 2 - 2 Dose Adolescent  Series) 10/28/2004 ---    IMM PNEUMOCOCCAL 19-64 (ADULT) MEDIUM RISK SERIES (1 of 1 - PPSV23) 10/28/2010 ---    IMM INFLUENZA (1) 9/1/2016 ---    PAP SMEAR 6/8/2019 6/8/2016    IMM DTaP/Tdap/Td Vaccine (2 - Td) 2/3/2027 2/3/2017            Results     POCT Fetal Nonstress Test      Component    NST Indications    poly    NST Baseline    120    NST Uterine Activity    none    NST Acoustic Stimulation    no    NST Assessment    reactive, cat1    NST Action Necessary    NST Other Data    NST Return    NST Read By                        Current Immunizations     Tdap Vaccine 2/3/2017  9:12 AM      Below and/or attached are the medications your provider expects you to take. Review all of your home medications and newly ordered medications with your provider and/or pharmacist. Follow medication instructions as directed by your provider and/or pharmacist. Please keep your medication list with you and share with your provider. Update the information when medications are discontinued, doses are changed, or new medications (including over-the-counter products) are added; and carry medication information at all times in the event of emergency situations     Allergies:  No Known Allergies          Medications  Valid as of: March 10, 2017 -  4:20 PM    Generic Name Brand Name Tablet Size Instructions for use    Cholecalciferol (Cap) Cholecalciferol 1000 UNIT Take 1 Cap by mouth every day.        Prenatal MV-Min-Fe Fum-FA-DHA   Take  by mouth.        .                 Medicines prescribed today were sent to:     None      Medication refill instructions:       If your prescription bottle indicates you have medication refills left, it is not necessary to call your provider’s office. Please contact your pharmacy and they will refill your medication.    If your prescription bottle indicates you do not have any refills left, you may request refills at any time through one of the following ways: The online [employer] system (except Urgent  Care), by calling your provider’s office, or by asking your pharmacy to contact your provider’s office with a refill request. Medication refills are processed only during regular business hours and may not be available until the next business day. Your provider may request additional information or to have a follow-up visit with you prior to refilling your medication.   *Please Note: Medication refills are assigned a new Rx number when refilled electronically. Your pharmacy may indicate that no refills were authorized even though a new prescription for the same medication is available at the pharmacy. Please request the medicine by name with the pharmacy before contacting your provider for a refill.        Other Notes About Your Plan     BELLA Schaeffer Access Code: Activation code not generated  Current [employer] Status: Active

## 2017-03-13 ENCOUNTER — ROUTINE PRENATAL (OUTPATIENT)
Dept: OBGYN | Facility: CLINIC | Age: 27
End: 2017-03-13
Payer: MEDICAID

## 2017-03-13 DIAGNOSIS — O40.3XX0 POLYHYDRAMNIOS, THIRD TRIMESTER, NOT APPLICABLE OR UNSPECIFIED FETUS: ICD-10-CM

## 2017-03-13 PROCEDURE — 59025 FETAL NON-STRESS TEST: CPT | Performed by: OBSTETRICS & GYNECOLOGY

## 2017-03-13 NOTE — MR AVS SNAPSHOT
Kyara Patelr   3/13/2017 2:00 PM   Routine Prenatal   MRN: [id number]    [facility]  [facility]   Dept Phone:  [phone]    Description:  Female : 1990   Provider:  Frank Palomino M.D.           Allergies as of 3/13/2017     No Known Allergies      You were diagnosed with     Polyhydramnios, third trimester, not applicable or unspecified fetus   [[id number]]         Vital Signs     Last Menstrual Period Smoking Status                2016 Former Smoker          Basic Information     Date Of Birth Sex Race Ethnicity Preferred Language    1990 Female  Non- English      Your appointments     Mar 17, 2017  8:00 AM   Fetal Non-Stress Test with PC NST   [facility] ([facility])    [address]  [place] [address] [phone]            Mar 17, 2017  9:00 AM   Group Visit with CENTERING   [facility] ([facility])    [address] [place] [address] [address]  [place] [address] [phone]            Mar 31, 2017  9:00 AM   Group Visit with CENTERING   [facility] ([facility])    [address] [place] [address] [address]  [place] [address] [phone]              Problem List              ICD-10-CM Priority Class Noted - Resolved    Tobacco use Z72.0   2016 - Present    Vitamin D deficiency E55.9   2016 - Present    Encounter for supervision of normal pregnancy in second trimester Z34.92   2016 - Present    Amniotic fluid abnormality - RONEL 21.58cm at 22wk O41.90X0   2016 - Present      Health Maintenance        Date Due Completion Dates    IMM HEP B VACCINE (1 of 3 - Primary Series) 1990 ---    IMM HEP A VACCINE (1 of 2 - Standard Series) 10/28/1992 ---    IMM HPV VACCINE (1 of 3 - Female 3 Dose Series) 10/28/2002 ---    IMM VARICELLA (CHICKENPOX) VACCINE (1 of 2 - 2 Dose Adolescent Series) 10/28/2004 ---    IMM PNEUMOCOCCAL 19-64 (ADULT) MEDIUM RISK SERIES (1 of 1 - PPSV23) 10/28/2010 ---    IMM INFLUENZA (1) 2016 ---    PAP SMEAR  6/8/2019 6/8/2016    IMM DTaP/Tdap/Td Vaccine (2 - Td) 2/3/2027 2/3/2017            Results       Current Immunizations     Tdap Vaccine 2/3/2017  9:12 AM      Below and/or attached are the medications your provider expects you to take. Review all of your home medications and newly ordered medications with your provider and/or pharmacist. Follow medication instructions as directed by your provider and/or pharmacist. Please keep your medication list with you and share with your provider. Update the information when medications are discontinued, doses are changed, or new medications (including over-the-counter products) are added; and carry medication information at all times in the event of emergency situations     Allergies:  No Known Allergies          Medications  Valid as of: March 13, 2017 -  4:17 PM    Generic Name Brand Name Tablet Size Instructions for use    Cholecalciferol (Cap) Cholecalciferol 1000 UNIT Take 1 Cap by mouth every day.        Prenatal MV-Min-Fe Fum-FA-DHA   Take  by mouth.        .                 Medicines prescribed today were sent to:     None      Medication refill instructions:       If your prescription bottle indicates you have medication refills left, it is not necessary to call your provider’s office. Please contact your pharmacy and they will refill your medication.    If your prescription bottle indicates you do not have any refills left, you may request refills at any time through one of the following ways: The online [employer] system (except Urgent Care), by calling your provider’s office, or by asking your pharmacy to contact your provider’s office with a refill request. Medication refills are processed only during regular business hours and may not be available until the next business day. Your provider may request additional information or to have a follow-up visit with you prior to refilling your medication.   *Please Note: Medication refills are assigned a new Rx number when  refilled electronically. Your pharmacy may indicate that no refills were authorized even though a new prescription for the same medication is available at the pharmacy. Please request the medicine by name with the pharmacy before contacting your provider for a refill.        Other Notes About Your Plan     BELLA Schaeffer Access Code: Activation code not generated  Current MyChart Status: Active

## 2017-03-17 ENCOUNTER — HOSPITAL ENCOUNTER (OUTPATIENT)
Facility: MEDICAL CENTER | Age: 27
End: 2017-03-17
Attending: NURSE PRACTITIONER
Payer: MEDICAID

## 2017-03-17 ENCOUNTER — ROUTINE PRENATAL (OUTPATIENT)
Dept: OBGYN | Facility: CLINIC | Age: 27
End: 2017-03-17
Payer: MEDICAID

## 2017-03-17 VITALS — DIASTOLIC BLOOD PRESSURE: 59 MMHG | SYSTOLIC BLOOD PRESSURE: 103 MMHG | WEIGHT: 220 LBS | BODY MASS INDEX: 35.53 KG/M2

## 2017-03-17 DIAGNOSIS — Z34.82 ENCOUNTER FOR SUPERVISION OF OTHER NORMAL PREGNANCY IN SECOND TRIMESTER: Primary | ICD-10-CM

## 2017-03-17 DIAGNOSIS — O41.92X0: ICD-10-CM

## 2017-03-17 DIAGNOSIS — O40.3XX0 POLYHYDRAMNIOS, THIRD TRIMESTER, NOT APPLICABLE OR UNSPECIFIED FETUS: ICD-10-CM

## 2017-03-17 LAB
NST ACOUSTIC STIMULATION: YES
NST ACTION NECESSARY: NORMAL
NST ASSESSMENT: NORMAL
NST BASELINE: 130
NST INDICATIONS: NORMAL
NST OTHER DATA: NORMAL
NST READ BY: NORMAL
NST RETURN: NORMAL
NST UTERINE ACTIVITY: NORMAL

## 2017-03-17 PROCEDURE — 87653 STREP B DNA AMP PROBE: CPT

## 2017-03-17 PROCEDURE — 59025 FETAL NON-STRESS TEST: CPT | Performed by: OBSTETRICS & GYNECOLOGY

## 2017-03-17 PROCEDURE — 90040 PR PRENATAL FOLLOW UP: CPT | Performed by: NURSE PRACTITIONER

## 2017-03-17 NOTE — PROGRESS NOTES
S:  Pt is  at 36w2d for Centering session #8.  No c/o.  Reports good FM.  Denies VB, LOF, RUCs or vaginal DC.    O:  Please see above vitals.        FHTs: 140        Fundal ht: 37        Fetal position: vertex        SVE: FT/50/-2          A:  IUP at 36w2d  Reactive NST today  Patient Active Problem List    Diagnosis Date Noted   • Amniotic fluid abnormality - RONEL 21.58cm at 22wk 2016   • Encounter for supervision of normal pregnancy in second trimester 2016   • Vitamin D deficiency 2016   • Tobacco use 2016        P:  1.  Questions answered.          2.  Encouraged adequate water intake        3.  GBS obtained today.        4.  F/u 1 wk MABEL, then 2wks Centering Session #9/10        5.  Continue 2x/wk NSTs.         6.  Referral placed for IOL @ 39wks.      Centering Topics Reviewed:  1.  Pregnancy to parenting transition  2.  Emotional adjustments  3.  Postpartum depression  4.  Pregnancy -- when to call  5.  Other topics: breastfeeding.

## 2017-03-17 NOTE — PATIENT INSTRUCTIONS
P:  1.  Questions answered.          2.  Encouraged adequate water intake        3.  GBS obtained today.        4.  F/u 1 wk MABEL, then 2wks Centering Session #9/10        5.  Continue 2x/wk NSTs.         6.  Referral placed for IOL @ 39wks.      Centering Topics Reviewed:  1.  Pregnancy to parenting transition  2.  Emotional adjustments  3.  Postpartum depression  4.  Pregnancy -- when to call  5.  Other topics: breastfeeding.

## 2017-03-17 NOTE — MR AVS SNAPSHOT
Kyara Olmstead Jones   3/17/2017 9:00 AM   Routine Prenatal   MRN: [id number]    [facility]   Dept Phone:  [phone]    Description:  Female : 1990   Provider:  Lacy Eaton C.N.M.           Allergies as of 3/17/2017     No Known Allergies      You were diagnosed with     Encounter for supervision of other normal pregnancy in second trimester   [2953638]  -  Primary     Amniotic fluid abnormality, second trimester, not applicable or unspecified fetus   [[id number]]         Vital Signs     Blood Pressure Weight Last Menstrual Period Smoking Status          103/59 mmHg 99.791 kg (220 lb) 2016 Former Smoker        Basic Information     Date Of Birth Sex Race Ethnicity Preferred Language    1990 Female  Non- English      Your appointments     Mar 20, 2017  2:30 PM   Fetal Non-Stress Test with PC NST   [facility] [address]  [facility] [address]   [phone]            Mar 24, 2017  2:00 PM   Fetal Non-Stress Test with PC NST   [facility] [address]  [place] [address]   [phone]            Mar 27, 2017  8:45 AM   OB Follow Up with KAMRAN Ghotra   [facility] [address]  [place] [address]   [phone]            Mar 31, 2017  9:00 AM   Group Visit with CENTERING   The [address] [address]   [phone]              Problem List              ICD-10-CM Priority Class Noted - Resolved    Tobacco use Z72.0   2016 - Present    Vitamin D deficiency E55.9   2016 - Present    Encounter for supervision of normal pregnancy in second trimester Z34.92   2016 - Present    Amniotic fluid abnormality - RONEL 21.58cm at 22wk O41.90X0   2016 - Present      Health Maintenance        Date Due Completion Dates    IMM HEP B VACCINE (1 of 3 - Primary Series) 1990 ---    IMM HEP A  VACCINE (1 of 2 - Standard Series) 10/28/1992 ---    IMM HPV VACCINE (1 of 3 - Female 3 Dose Series) 10/28/2002 ---    IMM VARICELLA (CHICKENPOX) VACCINE (1 of 2 - 2 Dose Adolescent Series) 10/28/2004 ---    IMM PNEUMOCOCCAL 19-64 (ADULT) MEDIUM RISK SERIES (1 of 1 - PPSV23) 10/28/2010 ---    IMM INFLUENZA (1) 9/1/2016 ---    PAP SMEAR 6/8/2019 6/8/2016    IMM DTaP/Tdap/Td Vaccine (2 - Td) 2/3/2027 2/3/2017            Current Immunizations     Tdap Vaccine 2/3/2017  9:12 AM      Below and/or attached are the medications your provider expects you to take. Review all of your home medications and newly ordered medications with your provider and/or pharmacist. Follow medication instructions as directed by your provider and/or pharmacist. Please keep your medication list with you and share with your provider. Update the information when medications are discontinued, doses are changed, or new medications (including over-the-counter products) are added; and carry medication information at all times in the event of emergency situations     Allergies:  No Known Allergies          Medications  Valid as of: March 17, 2017 - 10:51 AM    Generic Name Brand Name Tablet Size Instructions for use    Cholecalciferol (Cap) Cholecalciferol 1000 UNIT Take 1 Cap by mouth every day.        Prenatal MV-Min-Fe Fum-FA-DHA   Take  by mouth.        .                 Medicines prescribed today were sent to:     None      Medication refill instructions:       If your prescription bottle indicates you have medication refills left, it is not necessary to call your provider’s office. Please contact your pharmacy and they will refill your medication.    If your prescription bottle indicates you do not have any refills left, you may request refills at any time through one of the following ways: The online cWyze system (except Urgent Care), by calling your provider’s office, or by asking your pharmacy to contact your provider’s office with a refill  request. Medication refills are processed only during regular business hours and may not be available until the next business day. Your provider may request additional information or to have a follow-up visit with you prior to refilling your medication.   *Please Note: Medication refills are assigned a new Rx number when refilled electronically. Your pharmacy may indicate that no refills were authorized even though a new prescription for the same medication is available at the pharmacy. Please request the medicine by name with the pharmacy before contacting your provider for a refill.        Other Notes About Your Plan     BELLA Schaeffer Access Code: Activation code not generated  Current [employer] Status: Active

## 2017-03-17 NOTE — MR AVS SNAPSHOT
Kyara Olmstead Jones   3/17/2017 8:00 AM   Routine Prenatal   MRN: [id number]    [facility]   Dept Phone:  [phone]    Description:  Female : 1990   Provider:  Pushpa Eddy M.D.           Allergies as of 3/17/2017     No Known Allergies      You were diagnosed with     Polyhydramnios, third trimester, not applicable or unspecified fetus   [[id number]]         Vital Signs     Last Menstrual Period Smoking Status                2016 Former Smoker          Basic Information     Date Of Birth Sex Race Ethnicity Preferred Language    1990 Female  Non- English      Your appointments     Mar 20, 2017  2:30 PM   Fetal Non-Stress Test with PC NST   [facility])    [address] [place] [address] [address]  [place] [address]   [phone]            Mar 24, 2017  2:00 PM   Fetal Non-Stress Test with PC NST   [facility] [address]  [place] [address]   [phone]            Mar 27, 2017  8:45 AM   OB Follow Up with KAMRAN Ghotra   [facility])    [address] [place] [address] [address]  [place] [address]   [phone]            Mar 31, 2017  9:00 AM   Group Visit with CENTERING   [facility])    [address] [place] [address] [address]  [place] [address]   [phone]              Problem List              ICD-10-CM Priority Class Noted - Resolved    Tobacco use Z72.0   2016 - Present    Vitamin D deficiency E55.9   2016 - Present    Encounter for supervision of normal pregnancy in second trimester Z34.92   2016 - Present    Amniotic fluid abnormality - RONEL 21.58cm at 22wk O41.90X0   2016 - Present      Health Maintenance        Date Due Completion Dates    IMM HEP B VACCINE (1 of 3 - Primary Series) 1990 ---    IMM HEP A VACCINE (1 of 2 - Standard Series) 10/28/1992 ---    IMM HPV VACCINE (1 of 3 - Female 3 Dose Series) 10/28/2002 ---    IMM VARICELLA (CHICKENPOX) VACCINE (1 of 2 - 2 Dose  Adolescent Series) 10/28/2004 ---    IMM PNEUMOCOCCAL 19-64 (ADULT) MEDIUM RISK SERIES (1 of 1 - PPSV23) 10/28/2010 ---    IMM INFLUENZA (1) 9/1/2016 ---    PAP SMEAR 6/8/2019 6/8/2016    IMM DTaP/Tdap/Td Vaccine (2 - Td) 2/3/2027 2/3/2017            Results     POCT Fetal Nonstress Test      Component    NST Indications    Poly    NST Baseline    130    NST Uterine Activity    none    NST Acoustic Stimulation    yes    NST Assessment    reactive, cat 1    NST Action Necessary    NST Other Data    NST Return    NST Read By                        Current Immunizations     Tdap Vaccine 2/3/2017  9:12 AM      Below and/or attached are the medications your provider expects you to take. Review all of your home medications and newly ordered medications with your provider and/or pharmacist. Follow medication instructions as directed by your provider and/or pharmacist. Please keep your medication list with you and share with your provider. Update the information when medications are discontinued, doses are changed, or new medications (including over-the-counter products) are added; and carry medication information at all times in the event of emergency situations     Allergies:  No Known Allergies          Medications  Valid as of: March 17, 2017 - 10:51 AM    Generic Name Brand Name Tablet Size Instructions for use    Cholecalciferol (Cap) Cholecalciferol 1000 UNIT Take 1 Cap by mouth every day.        Prenatal MV-Min-Fe Fum-FA-DHA   Take  by mouth.        .                 Medicines prescribed today were sent to:     None      Medication refill instructions:       If your prescription bottle indicates you have medication refills left, it is not necessary to call your provider’s office. Please contact your pharmacy and they will refill your medication.    If your prescription bottle indicates you do not have any refills left, you may request refills at any time through one of the following ways: The online [email or web address] system  (except Urgent Care), by calling your provider’s office, or by asking your pharmacy to contact your provider’s office with a refill request. Medication refills are processed only during regular business hours and may not be available until the next business day. Your provider may request additional information or to have a follow-up visit with you prior to refilling your medication.   *Please Note: Medication refills are assigned a new Rx number when refilled electronically. Your pharmacy may indicate that no refills were authorized even though a new prescription for the same medication is available at the pharmacy. Please request the medicine by name with the pharmacy before contacting your provider for a refill.        Other Notes About Your Plan     BELLA Schaeffer Access Code: Activation code not generated  Current [email or web address] Status: Active

## 2017-03-18 DIAGNOSIS — Z34.82 ENCOUNTER FOR SUPERVISION OF OTHER NORMAL PREGNANCY IN SECOND TRIMESTER: ICD-10-CM

## 2017-03-19 LAB — GP B STREP DNA SPEC QL NAA+PROBE: NEGATIVE

## 2017-03-20 ENCOUNTER — ROUTINE PRENATAL (OUTPATIENT)
Dept: OBGYN | Facility: CLINIC | Age: 27
End: 2017-03-20
Payer: MEDICAID

## 2017-03-20 DIAGNOSIS — O40.3XX0 POLYHYDRAMNIOS, THIRD TRIMESTER, NOT APPLICABLE OR UNSPECIFIED FETUS: ICD-10-CM

## 2017-03-20 PROCEDURE — 59025 FETAL NON-STRESS TEST: CPT | Performed by: OBSTETRICS & GYNECOLOGY

## 2017-03-20 NOTE — MR AVS SNAPSHOT
Kyara Patelr   3/20/2017 2:30 PM   Routine Prenatal   MRN: [id number]    Department[facility]  [facility]   Dept Phone:  [phone]    Description:  Female : 1990   Provider:  Keke Huntley M.D.           Allergies as of 3/20/2017     No Known Allergies      You were diagnosed with     Polyhydramnios, third trimester, not applicable or unspecified fetus   [[id number]]         Vital Signs     Last Menstrual Period Smoking Status                2016 Former Smoker          Basic Information     Date Of Birth Sex Race Ethnicity Preferred Language    1990 Female  Non- English      Your appointments     Mar 24, 2017  2:00 PM   Fetal Non-Stress Test with PC NST   [facility])    [address] [place] [address] [address]  [facility] [address]   [phone]            Mar 27, 2017  8:45 AM   OB Follow Up with KAMRAN Ghotra   [facility] [address]  [facility] [address]   [phone]            Mar 31, 2017  9:00 AM   Group Visit with CENTERING   [facility])    [address] [place] [address] [address]  [facility] [address]   [phone]            2017  9:00 PM   TPC OP GEL IOL with NON-SURGICAL L&D   [facility] (--)    [address] [facility] [address]   [phone]            2017  9:00 AM   TPC INDUCTION OF LABOR with NON-SURGICAL L&D   [facility] & DELIVERY [facility] (--)    [address] [facility] [address]   [phone]              Problem List              ICD-10-CM Priority Class Noted - Resolved    Tobacco use Z72.0   2016 - Present    Vitamin D deficiency E55.9   2016 - Present    Encounter for supervision of normal pregnancy in second trimester Z34.92   2016 - Present    Amniotic fluid abnormality - RONEL 21.58cm at 22wk O41.90X0   2016 - Present      Health Maintenance        Date Due Completion Dates    IMM HEP B VACCINE (1 of 3 - Primary Series) 1990 ---    IMM HEP A VACCINE (1 of  2 - Standard Series) 10/28/1992 ---    IMM HPV VACCINE (1 of 3 - Female 3 Dose Series) 10/28/2002 ---    IMM VARICELLA (CHICKENPOX) VACCINE (1 of 2 - 2 Dose Adolescent Series) 10/28/2004 ---    IMM PNEUMOCOCCAL 19-64 (ADULT) MEDIUM RISK SERIES (1 of 1 - PPSV23) 10/28/2010 ---    IMM INFLUENZA (1) 9/1/2016 ---    PAP SMEAR 6/8/2019 6/8/2016    IMM DTaP/Tdap/Td Vaccine (2 - Td) 2/3/2027 2/3/2017            Results     POCT Fetal Nonstress Test      Component    NST Indications    Polyhydramnios    NST Baseline    130s    NST Uterine Activity    none    NST Acoustic Stimulation    NST Assessment    Cat 1    NST Action Necessary    NST Other Data    NST Return    twice weekly    NST Read By    Yuko                        Current Immunizations     Tdap Vaccine 2/3/2017  9:12 AM      Below and/or attached are the medications your provider expects you to take. Review all of your home medications and newly ordered medications with your provider and/or pharmacist. Follow medication instructions as directed by your provider and/or pharmacist. Please keep your medication list with you and share with your provider. Update the information when medications are discontinued, doses are changed, or new medications (including over-the-counter products) are added; and carry medication information at all times in the event of emergency situations     Allergies:  No Known Allergies          Medications  Valid as of: March 20, 2017 -  4:18 PM    Generic Name Brand Name Tablet Size Instructions for use    Cholecalciferol (Cap) Cholecalciferol 1000 UNIT Take 1 Cap by mouth every day.        Prenatal MV-Min-Fe Fum-FA-DHA   Take  by mouth.        .                 Medicines prescribed today were sent to:     None      Medication refill instructions:       If your prescription bottle indicates you have medication refills left, it is not necessary to call your provider’s office. Please contact your pharmacy and they will refill your  medication.    If your prescription bottle indicates you do not have any refills left, you may request refills at any time through one of the following ways: The online Telsima system (except Urgent Care), by calling your provider’s office, or by asking your pharmacy to contact your provider’s office with a refill request. Medication refills are processed only during regular business hours and may not be available until the next business day. Your provider may request additional information or to have a follow-up visit with you prior to refilling your medication.   *Please Note: Medication refills are assigned a new Rx number when refilled electronically. Your pharmacy may indicate that no refills were authorized even though a new prescription for the same medication is available at the pharmacy. Please request the medicine by name with the pharmacy before contacting your provider for a refill.        Other Notes About Your Plan     BELLA Saucedo           Telsima Access Code: Activation code not generated  Current [employer] Status: Active

## 2017-03-27 ENCOUNTER — ROUTINE PRENATAL (OUTPATIENT)
Dept: OBGYN | Facility: CLINIC | Age: 27
End: 2017-03-27
Payer: MEDICAID

## 2017-03-27 VITALS — SYSTOLIC BLOOD PRESSURE: 118 MMHG | WEIGHT: 226 LBS | BODY MASS INDEX: 36.49 KG/M2 | DIASTOLIC BLOOD PRESSURE: 60 MMHG

## 2017-03-27 DIAGNOSIS — O41.92X0: ICD-10-CM

## 2017-03-27 DIAGNOSIS — O40.3XX0 POLYHYDRAMNIOS, THIRD TRIMESTER, NOT APPLICABLE OR UNSPECIFIED FETUS: ICD-10-CM

## 2017-03-27 DIAGNOSIS — Z34.82 ENCOUNTER FOR SUPERVISION OF OTHER NORMAL PREGNANCY IN SECOND TRIMESTER: ICD-10-CM

## 2017-03-27 PROCEDURE — 90040 PR PRENATAL FOLLOW UP: CPT | Performed by: NURSE PRACTITIONER

## 2017-03-27 NOTE — PROGRESS NOTES
OB f/u. + fetal movement.  No VB, LOF or UC's.  Wt: 226lb      BP: 118/60  Good phone # [phone]  Preferred pharmacy confirmed.   IOL scheduled 4/5/17. gel on 4/4/17

## 2017-03-27 NOTE — MR AVS SNAPSHOT
Kyara Lockeuilar   3/27/2017 8:00 AM   Routine Prenatal   MRN: [id number]    [facility]   Dept Phone:  [phone]    Description:  Female : 1990   Provider:  KAMRAN Ghotra           Allergies as of 3/27/2017     No Known Allergies      You were diagnosed with     Polyhydramnios, third trimester, not applicable or unspecified fetus   [[id number]]         Vital Signs     Last Menstrual Period Smoking Status                2016 Former Smoker          Basic Information     Date Of Birth Sex Race Ethnicity Preferred Language    1990 Female  Non- English      Your appointments     Mar 31, 2017  9:00 AM   Group Visit with CENTERING   [facility] ([facility])    [address] [facility] [address]  [place] [address] [phone]            2017  9:00 PM   TPC OP GEL IOL with NON-SURGICAL L&D   [facility] (--)    [address] [facility] [place] [address]   [phone]            2017  9:00 AM   TPC INDUCTION OF LABOR with NON-SURGICAL L&D   [facility] & DEL[facility]ERY [facility] (--)    [address] [facility] [place] [address]   [phone]              Problem List              ICD-10-CM Priority Class Noted - Resolved    Tobacco use Z72.0   2016 - Present    Vitamin D deficiency E55.9   2016 - Present    Encounter for supervision of normal pregnancy in second trimester Z34.92   2016 - Present    Amniotic fluid abnormality - RONEL 21.58cm at 22wk O41.90X0   2016 - Present      Health Maintenance        Date Due Completion Dates    IMM HEP B VACCINE (1 of 3 - Primary Series) 1990 ---    IMM HEP A VACCINE (1 of 2 - Standard Series) 10/28/1992 ---    IMM HPV VACCINE (1 of 3 - Female 3 Dose Series) 10/28/2002 ---    IMM VARICELLA (CHICKENPOX) VACCINE (1 of 2 - 2 Dose Adolescent Series) 10/28/2004 ---    IMM PNEUMOCOCCAL 19-64 (ADULT) MEDIUM RISK SERIES (1 of 1 - PPSV23) 10/28/2010 ---    IMM INFLUENZA (1) 2016 ---    PAP SMEAR  6/8/2019 6/8/2016    IMM DTaP/Tdap/Td Vaccine (2 - Td) 2/3/2027 2/3/2017            Results       Current Immunizations     Tdap Vaccine 2/3/2017  9:12 AM      Below and/or attached are the medications your provider expects you to take. Review all of your home medications and newly ordered medications with your provider and/or pharmacist. Follow medication instructions as directed by your provider and/or pharmacist. Please keep your medication list with you and share with your provider. Update the information when medications are discontinued, doses are changed, or new medications (including over-the-counter products) are added; and carry medication information at all times in the event of emergency situations     Allergies:  No Known Allergies          Medications  Valid as of: March 27, 2017 - 10:50 AM    Generic Name Brand Name Tablet Size Instructions for use    Cholecalciferol (Cap) Cholecalciferol 1000 UNIT Take 1 Cap by mouth every day.        Prenatal MV-Min-Fe Fum-FA-DHA   Take  by mouth.        .                 Medicines prescribed today were sent to:     [facility] [address] [place], [place] [address] [address] [place], [place] [address] [address] [place] [address]    Phone: [phone] Fax: [phone]    Open 24 Hours?: No      Medication refill instructions:       If your prescription bottle indicates you have medication refills left, it is not necessary to call your provider’s office. Please contact your pharmacy and they will refill your medication.    If your prescription bottle indicates you do not have any refills left, you may request refills at any time through one of the following ways: The online LRN system (except Urgent Care), by calling your provider’s office, or by asking your pharmacy to contact your provider’s office with a refill request. Medication refills are processed only during regular business hours and may not be available until the next business day. Your provider may request additional information or  to have a follow-up visit with you prior to refilling your medication.   *Please Note: Medication refills are assigned a new Rx number when refilled electronically. Your pharmacy may indicate that no refills were authorized even though a new prescription for the same medication is available at the pharmacy. Please request the medicine by name with the pharmacy before contacting your provider for a refill.        Other Notes About Your Plan     BELLA Schaeffer Access Code: Activation code not generated  Current [employer] Status: Active

## 2017-03-31 ENCOUNTER — ROUTINE PRENATAL (OUTPATIENT)
Dept: OBGYN | Facility: CLINIC | Age: 27
End: 2017-03-31
Payer: MEDICAID

## 2017-03-31 VITALS — BODY MASS INDEX: 36.49 KG/M2 | WEIGHT: 226 LBS | SYSTOLIC BLOOD PRESSURE: 133 MMHG | DIASTOLIC BLOOD PRESSURE: 86 MMHG

## 2017-03-31 DIAGNOSIS — O40.3XX0 POLYHYDRAMNIOS, THIRD TRIMESTER, NOT APPLICABLE OR UNSPECIFIED FETUS: ICD-10-CM

## 2017-03-31 DIAGNOSIS — O41.92X0: ICD-10-CM

## 2017-03-31 DIAGNOSIS — Z34.82 ENCOUNTER FOR SUPERVISION OF OTHER NORMAL PREGNANCY IN SECOND TRIMESTER: Primary | ICD-10-CM

## 2017-03-31 DIAGNOSIS — O16.3 ELEVATED BLOOD PRESSURE COMPLICATING PREGNANCY IN THIRD TRIMESTER, ANTEPARTUM: ICD-10-CM

## 2017-03-31 LAB
APPEARANCE UR: NORMAL
BILIRUB UR STRIP-MCNC: NORMAL MG/DL
COLOR UR AUTO: NORMAL
GLUCOSE UR STRIP.AUTO-MCNC: NEGATIVE MG/DL
KETONES UR STRIP.AUTO-MCNC: NEGATIVE MG/DL
LEUKOCYTE ESTERASE UR QL STRIP.AUTO: NEGATIVE
NITRITE UR QL STRIP.AUTO: NEGATIVE
NST ACOUSTIC STIMULATION: NORMAL
NST ACTION NECESSARY: NORMAL
NST ASSESSMENT: REACTIVE
NST BASELINE: 140
NST INDICATIONS: NORMAL
NST OTHER DATA: NORMAL
NST READ BY: NORMAL
NST RETURN: NORMAL
NST UTERINE ACTIVITY: NORMAL
PH UR STRIP.AUTO: 6 [PH] (ref 5–8)
PROT UR QL STRIP: NORMAL MG/DL
RBC UR QL AUTO: NEGATIVE
SP GR UR STRIP.AUTO: 1.02
UROBILINOGEN UR STRIP-MCNC: NORMAL MG/DL

## 2017-03-31 PROCEDURE — 81002 URINALYSIS NONAUTO W/O SCOPE: CPT | Performed by: NURSE PRACTITIONER

## 2017-03-31 PROCEDURE — 59025 FETAL NON-STRESS TEST: CPT | Performed by: OBSTETRICS & GYNECOLOGY

## 2017-03-31 PROCEDURE — 90040 PR PRENATAL FOLLOW UP: CPT | Performed by: NURSE PRACTITIONER

## 2017-03-31 NOTE — PATIENT INSTRUCTIONS
P:  1.  Questions answered.          2.  Encouraged adequate water intake        3.  F/u 1wks MABEL, cont 2x.wk NSTs.         4.  IOL is GEL on 17 at 9pm and IOL on 17 at 9am.    Centering Topics Reviewed:  1.  Putting it all together  2.  [place] safety  3.  Infant massage  4.  [place] care  5.  Growth and development -- the first month  6.  Home and family changes  7.  [place] -- when to call  8.  Mom postpartum -- when to call

## 2017-03-31 NOTE — MR AVS SNAPSHOT
Kyara Huertalar   3/31/2017 9:00 AM   Routine Prenatal   MRN: [id number]    [facility]   Dept Phone:  [phone]    Description:  Female : 1990   Provider:  Lacy Eaton C.N.M.           Allergies as of 3/31/2017     No Known Allergies      You were diagnosed with     Encounter for supervision of other normal pregnancy in second trimester   [0663715]  -  Primary     Amniotic fluid abnormality, second trimester, not applicable or unspecified fetus   [4249142]       Elevated blood pressure complicating pregnancy in third trimester, antepartum   [4391360]         Vital Signs     Blood Pressure Weight Last Menstrual Period Smoking Status          133/86 mmHg 102.513 kg (226 lb) 2016 Former Smoker        Basic Information     Date Of Birth Sex Race Ethnicity Preferred Language    1990 Female  Non- English      Your appointments     2017  9:30 AM   Fetal Non-Stress Test with PC NST   [facility] [address]  [facility] [address]   [phone]            2017  9:00 PM   [facility] OP GEL IOL with NON-SURGICAL L&D   [facility] (--)    [address] [facility] [place] [address]   [phone]            2017  9:00 AM   [facility] INDUCTION OF LABOR with NON-SURGICAL L&D   [facility] (--)    [address] [facility] [place] [address]   [phone]              Problem List              ICD-10-CM Priority Class Noted - Resolved    Tobacco use Z72.0   2016 - Present    Vitamin D deficiency E55.9   2016 - Present    Encounter for supervision of normal pregnancy in second trimester Z34.92   2016 - Present    Amniotic fluid abnormality - RONEL 21.58cm at 22wk O41.90X0   2016 - Present      Health Maintenance        Date Due Completion Dates    IMM HEP B VACCINE (1 of 3 - Primary Series) 1990 ---    IMM HEP A VACCINE (1 of 2 - Standard Series) 10/28/1992 ---    IMM HPV VACCINE (1 of  3 - Female 3 Dose Series) 10/28/2002 ---    IMM VARICELLA (CHICKENPOX) VACCINE (1 of 2 - 2 Dose Adolescent Series) 10/28/2004 ---    IMM PNEUMOCOCCAL 19-64 (ADULT) MEDIUM RISK SERIES (1 of 1 - PPSV23) 10/28/2010 ---    IMM INFLUENZA (1) 9/1/2016 ---    PAP SMEAR 6/8/2019 6/8/2016    IMM DTaP/Tdap/Td Vaccine (2 - Td) 2/3/2027 2/3/2017            Results     POCT Urinalysis      Component Value Standard Range & Units    POC Color  Negative    POC Appearance  Negative    POC Leukocyte Esterase negative Negative    POC Nitrites negative Negative    POC Urobiligen  Negative (0.2) mg/dL    POC Protein trace Negative mg/dL    POC Urine PH 6.0 5.0 - 8.0    POC Blood negative Negative    POC Specific Gravity 1.020 <1.005 - >1.030    POC Ketones negative Negative mg/dL    POC Biliruben  Negative mg/dL    POC Glucose negative Negative mg/dL                        Current Immunizations     Tdap Vaccine 2/3/2017  9:12 AM      Below and/or attached are the medications your provider expects you to take. Review all of your home medications and newly ordered medications with your provider and/or pharmacist. Follow medication instructions as directed by your provider and/or pharmacist. Please keep your medication list with you and share with your provider. Update the information when medications are discontinued, doses are changed, or new medications (including over-the-counter products) are added; and carry medication information at all times in the event of emergency situations     Allergies:  No Known Allergies          Medications  Valid as of: March 31, 2017 - 10:37 AM    Generic Name Brand Name Tablet Size Instructions for use    Cholecalciferol (Cap) Cholecalciferol 1000 UNIT Take 1 Cap by mouth every day.        Prenatal MV-Min-Fe Fum-FA-DHA   Take  by mouth.        .                 Medicines prescribed today were sent to:     [facility] [place] - [address] [place] [address]    Phone:  [phone] Fax: [phone]    Open 24 Hours?: No      Medication refill instructions:       If your prescription bottle indicates you have medication refills left, it is not necessary to call your provider’s office. Please contact your pharmacy and they will refill your medication.    If your prescription bottle indicates you do not have any refills left, you may request refills at any time through one of the following ways: The online Hithru system (except Urgent Care), by calling your provider’s office, or by asking your pharmacy to contact your provider’s office with a refill request. Medication refills are processed only during regular business hours and may not be available until the next business day. Your provider may request additional information or to have a follow-up visit with you prior to refilling your medication.   *Please Note: Medication refills are assigned a new Rx number when refilled electronically. Your pharmacy may indicate that no refills were authorized even though a new prescription for the same medication is available at the pharmacy. Please request the medicine by name with the pharmacy before contacting your provider for a refill.        Instructions    P:  1.  Questions answered.          2.  Encouraged adequate water intake        3.  F/u 1wks MABEL, cont 2x.wk NSTs.         4.  IOL is GEL on 17 at 9pm and IOL on 17 at 9am.    Centering Topics Reviewed:  1.  Putting it all together  2.  [place] safety  3.  Infant massage  4.  [place] care  5.  Growth and development -- the first month  6.  Home and family changes  7.   -- when to call  8.  Mom postpartum -- when to call       Other Notes About Your Plan     BELLA Schaeffer Access Code: Activation code not generated  Current [employer] Status: Active

## 2017-03-31 NOTE — PROGRESS NOTES
S:  Pt is  at 38w2d for Centering session #9.  No c/o.  Reports good FM.  Denies VB, LOF, RUCs or vaginal DC.    O:  Please see above vitals.        FHTs: 140        Fundal ht: 38        Fetal position: vertex        SVE: Ft/50/-2              GBS neg on 3/17/17        UA:  Tr protein    A:  IUP at 38w2d  Patient Active Problem List    Diagnosis Date Noted   • Amniotic fluid abnormality - RONEL 21.58cm at 22wk 2016   • Encounter for supervision of normal pregnancy in second trimester 2016   • Vitamin D deficiency 2016   • Tobacco use 2016        P:  1.  Questions answered.          2.  Encouraged adequate water intake        3.  F/u 1wks MABEL, cont 2x.wk NSTs.         4.  IOL is GEL on 17 at 9pm and IOL on 17 at 9am.    Centering Topics Reviewed:  1.  Putting it all together  2.  [place] safety  3.  Infant massage  4.  [place] care  5.  Growth and development -- the first month  6.  Home and family changes  7.   -- when to call  8.  Mom postpartum -- when to call

## 2017-03-31 NOTE — MR AVS SNAPSHOT
Kyara Lockeuilar   3/31/2017 8:30 AM   Routine Prenatal   MRN: [id number]    [facility]   Dept Phone:  [phone]    Description:  Female : 1990   Provider:  Angelita Sesay M.D.           Allergies as of 3/31/2017     No Known Allergies      You were diagnosed with     Polyhydramnios, third trimester, not applicable or unspecified fetus   [[id number]]         Vital Signs     Last Menstrual Period Smoking Status                2016 Former Smoker          Basic Information     Date Of Birth Sex Race Ethnicity Preferred Language    1990 Female  Non- English      Your appointments     Mar 31, 2017  9:00 AM   Group Visit with CENTERING   [facility] ([facility])    [address] [facility] [address]  [place] [address]   [phone]            2017  9:00 PM   [facility] OP GEL IOL with NON-SURGICAL L&D   [facility] [place]    [address] [facility] [place] [address]   [phone]            2017  9:00 AM   [facility] INDUCTION OF LABOR with NON-SURGICAL L&D   [facility] (--)    [address] [facility] [place] [address]   [phone]              Problem List              ICD-10-CM Priority Class Noted - Resolved    Tobacco use Z72.0   2016 - Present    Vitamin D deficiency E55.9   2016 - Present    Encounter for supervision of normal pregnancy in second trimester Z34.92   2016 - Present    Amniotic fluid abnormality - RONEL 21.58cm at 22wk O41.90X0   2016 - Present      Health Maintenance        Date Due Completion Dates    IMM HEP B VACCINE (1 of 3 - Primary Series) 1990 ---    IMM HEP A VACCINE (1 of 2 - Standard Series) 10/28/1992 ---    IMM HPV VACCINE (1 of 3 - Female 3 Dose Series) 10/28/2002 ---    IMM VARICELLA (CHICKENPOX) VACCINE (1 of 2 - 2 Dose Adolescent Series) 10/28/2004 ---    IMM PNEUMOCOCCAL 19-64 (ADULT) MEDIUM RISK SERIES (1 of 1 - PPSV23) 10/28/2010 ---    IMM INFLUENZA (1) 2016 ---    PAP  SMEAR 6/8/2019 6/8/2016    IMM DTaP/Tdap/Td Vaccine (2 - Td) 2/3/2027 2/3/2017            Results       Current Immunizations     Tdap Vaccine 2/3/2017  9:12 AM      Below and/or attached are the medications your provider expects you to take. Review all of your home medications and newly ordered medications with your provider and/or pharmacist. Follow medication instructions as directed by your provider and/or pharmacist. Please keep your medication list with you and share with your provider. Update the information when medications are discontinued, doses are changed, or new medications (including over-the-counter products) are added; and carry medication information at all times in the event of emergency situations     Allergies:  No Known Allergies          Medications  Valid as of: March 31, 2017 -  8:51 AM    Generic Name Brand Name Tablet Size Instructions for use    Cholecalciferol (Cap) Cholecalciferol 1000 UNIT Take 1 Cap by mouth every day.        Prenatal MV-Min-Fe Fum-FA-DHA   Take  by mouth.        .                 Medicines prescribed today were sent to:     [facility] [address] [place], [place] [address] [address] [place], [place] [address] [address] [place] [address]    Phone: [phone] Fax: [phone]    Open 24 Hours?: No      Medication refill instructions:       If your prescription bottle indicates you have medication refills left, it is not necessary to call your provider’s office. Please contact your pharmacy and they will refill your medication.    If your prescription bottle indicates you do not have any refills left, you may request refills at any time through one of the following ways: The online [employer] system (except Urgent Care), by calling your provider’s office, or by asking your pharmacy to contact your provider’s office with a refill request. Medication refills are processed only during regular business hours and may not be available until the next business day. Your provider may request additional  information or to have a follow-up visit with you prior to refilling your medication.   *Please Note: Medication refills are assigned a new Rx number when refilled electronically. Your pharmacy may indicate that no refills were authorized even though a new prescription for the same medication is available at the pharmacy. Please request the medicine by name with the pharmacy before contacting your provider for a refill.        Other Notes About Your Plan     BELLA Schaeffer Access Code: Activation code not generated  Current [email or web address] Status: Active

## 2017-04-03 ENCOUNTER — ROUTINE PRENATAL (OUTPATIENT)
Dept: OBGYN | Facility: CLINIC | Age: 27
End: 2017-04-03
Payer: MEDICAID

## 2017-04-03 DIAGNOSIS — O40.3XX0 POLYHYDRAMNIOS, THIRD TRIMESTER, NOT APPLICABLE OR UNSPECIFIED FETUS: ICD-10-CM

## 2017-04-03 PROCEDURE — 59025 FETAL NON-STRESS TEST: CPT | Performed by: OBSTETRICS & GYNECOLOGY

## 2017-04-03 NOTE — MR AVS SNAPSHOT
Kyara Olmstead Jones   4/3/2017 1:00 PM   Routine Prenatal   MRN: [id number]    [facility]   Dept Phone:  [phone]    Description:  Female : 1990   Provider:  Keke Huntley M.D.           Allergies as of 4/3/2017     No Known Allergies      You were diagnosed with     Polyhydramnios, third trimester, not applicable or unspecified fetus   [[id number]]         Vital Signs     Last Menstrual Period Smoking Status                2016 Former Smoker          Basic Information     Date Of Birth Sex Race Ethnicity Preferred Language    1990 Female  Non- English      Your appointments     2017  9:00 PM   [facility] OP GEL IOL with NON-SURGICAL L&D   [facility] (--)    [address] [facility] [address]   [phone]            2017  9:00 AM   [facility] INDUCTION OF LABOR with NON-SURGICAL L&D   [facility] (--)    [address] [facility] [address]   [phone]              Problem List              ICD-10-CM Priority Class Noted - Resolved    Tobacco use Z72.0   2016 - Present    Vitamin D deficiency E55.9   2016 - Present    Encounter for supervision of normal pregnancy in second trimester Z34.92   2016 - Present    Amniotic fluid abnormality - RONEL 21.58cm at 22wk O41.90X0   2016 - Present      Health Maintenance        Date Due Completion Dates    IMM HEP B VACCINE (1 of 3 - Primary Series) 1990 ---    IMM HEP A VACCINE (1 of 2 - Standard Series) 10/28/1992 ---    IMM HPV VACCINE (1 of 3 - Female 3 Dose Series) 10/28/2002 ---    IMM VARICELLA (CHICKENPOX) VACCINE (1 of 2 - 2 Dose Adolescent Series) 10/28/2004 ---    IMM PNEUMOCOCCAL 19-64 (ADULT) MEDIUM RISK SERIES (1 of 1 - PPSV23) 10/28/2010 ---    PAP SMEAR 2019    IMM DTaP/Tdap/Td Vaccine (2 - Td) 2/3/2027 2/3/2017            Results     POCT Fetal Nonstress Test      Component    NST Indications    Polyhydramnios    NST Baseline    120s    NST Uterine Activity    irregular    NST Acoustic Stimulation    NST Assessment    Cat 1    NST Action Necessary    NST Other Data    NST Return    NST Read By    Yuko                        Current Immunizations     Tdap Vaccine 2/3/2017  9:12 AM      Below and/or attached are the medications your provider expects you to take. Review all of your home medications and newly ordered medications with your provider and/or pharmacist. Follow medication instructions as directed by your provider and/or pharmacist. Please keep your medication list with you and share with your provider. Update the information when medications are discontinued, doses are changed, or new medications (including over-the-counter products) are added; and carry medication information at all times in the event of emergency situations     Allergies:  No Known Allergies          Medications  Valid as of: April 03, 2017 -  2:05 PM    Generic Name Brand Name Tablet Size Instructions for use    Cholecalciferol (Cap) Cholecalciferol 1000 UNIT Take 1 Cap by mouth every day.        Prenatal MV-Min-Fe Fum-FA-DHA   Take  by mouth.        .                 Medicines prescribed today were sent to:     [facility] [address] [place] [address] [address] [place], [place] [address] [address] [place] [address]    Phone: [phone] Fax: [phone]    Open 24 Hours?: No      Medication refill instructions:       If your prescription bottle indicates you have medication refills left, it is not necessary to call your provider’s office. Please contact your pharmacy and they will refill your medication.    If your prescription bottle indicates you do not have any refills left, you may request refills at any time through one of the following ways: The online [employer] system (except Urgent Care), by calling your provider’s office, or by asking your pharmacy to contact your provider’s office with a refill request. Medication refills are processed only during regular business hours and  may not be available until the next business day. Your provider may request additional information or to have a follow-up visit with you prior to refilling your medication.   *Please Note: Medication refills are assigned a new Rx number when refilled electronically. Your pharmacy may indicate that no refills were authorized even though a new prescription for the same medication is available at the pharmacy. Please request the medicine by name with the pharmacy before contacting your provider for a refill.        Other Notes About Your Plan     BELLA Schaeffer Access Code: Activation code not generated  Current [employer] Status: Active

## 2017-04-04 ENCOUNTER — HOSPITAL ENCOUNTER (OUTPATIENT)
Facility: MEDICAL CENTER | Age: 27
End: 2017-04-04
Attending: OBSTETRICS & GYNECOLOGY | Admitting: OBSTETRICS & GYNECOLOGY
Payer: MEDICAID

## 2017-04-04 VITALS
DIASTOLIC BLOOD PRESSURE: 65 MMHG | SYSTOLIC BLOOD PRESSURE: 119 MMHG | RESPIRATION RATE: 20 BRPM | WEIGHT: 226 LBS | HEIGHT: 66 IN | BODY MASS INDEX: 36.32 KG/M2 | HEART RATE: 84 BPM | TEMPERATURE: 97.9 F

## 2017-04-04 PROCEDURE — 59025 FETAL NON-STRESS TEST: CPT | Performed by: NURSE PRACTITIONER

## 2017-04-04 PROCEDURE — 59200 INSERT CERVICAL DILATOR: CPT

## 2017-04-04 PROCEDURE — 700101 HCHG RX REV CODE 250: Performed by: NURSE PRACTITIONER

## 2017-04-04 RX ADMIN — DINOPROSTONE 5 MG: 20 SUPPOSITORY VAGINAL at 22:20

## 2017-04-05 ENCOUNTER — HOSPITAL ENCOUNTER (INPATIENT)
Facility: MEDICAL CENTER | Age: 27
LOS: 4 days | End: 2017-04-09
Attending: OBSTETRICS & GYNECOLOGY | Admitting: OBSTETRICS & GYNECOLOGY
Payer: MEDICAID

## 2017-04-05 LAB
BASOPHILS # BLD AUTO: 0.2 % (ref 0–1.8)
BASOPHILS # BLD: 0.03 K/UL (ref 0–0.12)
EOSINOPHIL # BLD AUTO: 0.24 K/UL (ref 0–0.51)
EOSINOPHIL NFR BLD: 1.8 % (ref 0–6.9)
ERYTHROCYTE [DISTWIDTH] IN BLOOD BY AUTOMATED COUNT: 41.2 FL (ref 35.9–50)
HCT VFR BLD AUTO: 37.2 % (ref 37–47)
HGB BLD-MCNC: 12.2 G/DL (ref 12–16)
HOLDING TUBE BB 8507: NORMAL
IMM GRANULOCYTES # BLD AUTO: 0.14 K/UL (ref 0–0.11)
IMM GRANULOCYTES NFR BLD AUTO: 1.1 % (ref 0–0.9)
LYMPHOCYTES # BLD AUTO: 1.7 K/UL (ref 1–4.8)
LYMPHOCYTES NFR BLD: 12.9 % (ref 22–41)
MCH RBC QN AUTO: 28.5 PG (ref 27–33)
MCHC RBC AUTO-ENTMCNC: 32.8 G/DL (ref 33.6–35)
MCV RBC AUTO: 86.9 FL (ref 81.4–97.8)
MONOCYTES # BLD AUTO: 0.78 K/UL (ref 0–0.85)
MONOCYTES NFR BLD AUTO: 5.9 % (ref 0–13.4)
NEUTROPHILS # BLD AUTO: 10.29 K/UL (ref 2–7.15)
NEUTROPHILS NFR BLD: 78.1 % (ref 44–72)
NRBC # BLD AUTO: 0 K/UL
NRBC BLD AUTO-RTO: 0 /100 WBC
PLATELET # BLD AUTO: 233 K/UL (ref 164–446)
PMV BLD AUTO: 10.8 FL (ref 9–12.9)
RBC # BLD AUTO: 4.28 M/UL (ref 4.2–5.4)
WBC # BLD AUTO: 13.2 K/UL (ref 4.8–10.8)

## 2017-04-05 PROCEDURE — 700111 HCHG RX REV CODE 636 W/ 250 OVERRIDE (IP)

## 2017-04-05 PROCEDURE — 700102 HCHG RX REV CODE 250 W/ 637 OVERRIDE(OP): Performed by: FAMILY MEDICINE

## 2017-04-05 PROCEDURE — 700111 HCHG RX REV CODE 636 W/ 250 OVERRIDE (IP): Performed by: FAMILY MEDICINE

## 2017-04-05 PROCEDURE — 700111 HCHG RX REV CODE 636 W/ 250 OVERRIDE (IP): Performed by: OBSTETRICS & GYNECOLOGY

## 2017-04-05 PROCEDURE — 85025 COMPLETE CBC W/AUTO DIFF WBC: CPT

## 2017-04-05 PROCEDURE — 770002 HCHG ROOM/CARE - OB PRIVATE (112)

## 2017-04-05 PROCEDURE — A9270 NON-COVERED ITEM OR SERVICE: HCPCS | Performed by: FAMILY MEDICINE

## 2017-04-05 RX ORDER — SODIUM CHLORIDE, SODIUM LACTATE, POTASSIUM CHLORIDE, CALCIUM CHLORIDE 600; 310; 30; 20 MG/100ML; MG/100ML; MG/100ML; MG/100ML
INJECTION, SOLUTION INTRAVENOUS
Status: ACTIVE
Start: 2017-04-05 | End: 2017-04-05

## 2017-04-05 RX ORDER — MISOPROSTOL 200 UG/1
800 TABLET ORAL
Status: DISCONTINUED | OUTPATIENT
Start: 2017-04-05 | End: 2017-04-06 | Stop reason: HOSPADM

## 2017-04-05 RX ORDER — ONDANSETRON 2 MG/ML
4 INJECTION INTRAMUSCULAR; INTRAVENOUS EVERY 6 HOURS PRN
Status: DISCONTINUED | OUTPATIENT
Start: 2017-04-05 | End: 2017-04-06

## 2017-04-05 RX ORDER — IBUPROFEN 600 MG/1
600 TABLET ORAL EVERY 6 HOURS PRN
Status: CANCELLED | OUTPATIENT
Start: 2017-04-05

## 2017-04-05 RX ORDER — HYDROCODONE BITARTRATE AND ACETAMINOPHEN 5; 325 MG/1; MG/1
1 TABLET ORAL EVERY 4 HOURS PRN
Status: CANCELLED | OUTPATIENT
Start: 2017-04-05

## 2017-04-05 RX ORDER — ONDANSETRON 4 MG/1
4 TABLET, ORALLY DISINTEGRATING ORAL EVERY 6 HOURS PRN
Status: DISCONTINUED | OUTPATIENT
Start: 2017-04-05 | End: 2017-04-06

## 2017-04-05 RX ORDER — SODIUM CHLORIDE, SODIUM LACTATE, POTASSIUM CHLORIDE, CALCIUM CHLORIDE 600; 310; 30; 20 MG/100ML; MG/100ML; MG/100ML; MG/100ML
INJECTION, SOLUTION INTRAVENOUS
Status: COMPLETED
Start: 2017-04-05 | End: 2017-04-05

## 2017-04-05 RX ORDER — HYDROXYZINE 50 MG/1
50 TABLET, FILM COATED ORAL EVERY 6 HOURS PRN
Status: DISCONTINUED | OUTPATIENT
Start: 2017-04-05 | End: 2017-04-06 | Stop reason: HOSPADM

## 2017-04-05 RX ORDER — CARBOPROST TROMETHAMINE 250 UG/ML
250 INJECTION, SOLUTION INTRAMUSCULAR
Status: DISCONTINUED | OUTPATIENT
Start: 2017-04-05 | End: 2017-04-06 | Stop reason: HOSPADM

## 2017-04-05 RX ORDER — SODIUM CHLORIDE, SODIUM LACTATE, POTASSIUM CHLORIDE, CALCIUM CHLORIDE 600; 310; 30; 20 MG/100ML; MG/100ML; MG/100ML; MG/100ML
INJECTION, SOLUTION INTRAVENOUS CONTINUOUS
Status: ACTIVE | OUTPATIENT
Start: 2017-04-05 | End: 2017-04-05

## 2017-04-05 RX ORDER — ACETAMINOPHEN 325 MG/1
325 TABLET ORAL EVERY 4 HOURS PRN
Status: CANCELLED | OUTPATIENT
Start: 2017-04-05

## 2017-04-05 RX ORDER — HYDROCODONE BITARTRATE AND ACETAMINOPHEN 5; 325 MG/1; MG/1
2 TABLET ORAL EVERY 4 HOURS PRN
Status: CANCELLED | OUTPATIENT
Start: 2017-04-05

## 2017-04-05 RX ORDER — METHYLERGONOVINE MALEATE 0.2 MG/ML
0.2 INJECTION INTRAVENOUS
Status: DISCONTINUED | OUTPATIENT
Start: 2017-04-05 | End: 2017-04-06 | Stop reason: HOSPADM

## 2017-04-05 RX ORDER — ALUMINA, MAGNESIA, AND SIMETHICONE 2400; 2400; 240 MG/30ML; MG/30ML; MG/30ML
30 SUSPENSION ORAL EVERY 6 HOURS PRN
Status: DISCONTINUED | OUTPATIENT
Start: 2017-04-05 | End: 2017-04-06 | Stop reason: HOSPADM

## 2017-04-05 RX ADMIN — SODIUM CHLORIDE, POTASSIUM CHLORIDE, SODIUM LACTATE AND CALCIUM CHLORIDE 1000 ML: 600; 310; 30; 20 INJECTION, SOLUTION INTRAVENOUS at 23:19

## 2017-04-05 RX ADMIN — SODIUM CHLORIDE, POTASSIUM CHLORIDE, SODIUM LACTATE AND CALCIUM CHLORIDE: 600; 310; 30; 20 INJECTION, SOLUTION INTRAVENOUS at 16:04

## 2017-04-05 RX ADMIN — MISOPROSTOL 25 MCG: 100 TABLET ORAL at 10:47

## 2017-04-05 RX ADMIN — Medication 1 MILLI-UNITS/MIN: at 16:04

## 2017-04-05 ASSESSMENT — LIFESTYLE VARIABLES
EVER_SMOKED: YES
PACK_YEARS: 4
DO YOU DRINK ALCOHOL: NO
ALCOHOL_USE: NO

## 2017-04-05 NOTE — IP AVS SNAPSHOT
4/9/2017          Kyara Jones  [address]   [place] [address]    Dear Kyara:    [facility] wants to ensure your discharge home is safe and you or your loved ones have had all your questions answered regarding your care after you leave the hospital.    You may receive a telephone call within two days of your discharge.  This call is to make certain you understand your discharge instructions as well as ensure we provided you with the best care possible during your stay with us.     The call will only last approximately 3-5 minutes and will be done by a nurse.    Once again, we want to ensure your discharge home is safe and that you have a clear understanding of any next steps in your care.  If you have any questions or concerns, please do not hesitate to contact us, we are here for you.  Thank you for choosing [facility] for your healthcare needs.    Sincerely,    Oswaldo Montemayor    [facility]

## 2017-04-05 NOTE — CARE PLAN
Problem: Knowledge Deficit  Goal: Knowledge of disease process/condition, treatment plan, diagnostic tests, and medications will improve  Outcome: PROGRESSING AS EXPECTED  POC discussed with patient and FOB, questions answered.

## 2017-04-05 NOTE — PROGRESS NOTES
; EDC 17; EGA 39.0    0900 - Pt to L&D S 223 for IOL.   0915 - External monitors applied x 2. Pt reports good FM. Denies LOF, VB, or feeling UCs. Admission profile completed.   929 - Dr. Hodge at bedside. SVE /-2. Orders recieved  1045 - Cytotec placed by RUDY Skelton RN. SVE /-2.   1510 - SVE /-2. UC's q5 mins. Dr. Eddy notified. Orders received to start pitocin.    - Pt feeling more uncomfortable with UC's. SVE 3/60/-2  1850 - Report given to TINA Hernandez RN.

## 2017-04-05 NOTE — H&P
History and Physical      Kyara Jones is a 26 y.o. year old female  at 39w0d who presents for IOL due to polyhydramnios. Gel placed last night.    Subjective:   negative  For CTXS.   negative Feels pain   negative for LOF  negative for vaginal bleeding.   positive for fetal movement    Patient's last menstrual period was 2016.  2017, by Last Menstrual Period    ROS: Patient denies fever, chills, nausea, vomiting , headache, visual disturbance, or dysuria.    No past medical history on file.  Past Surgical History   Procedure Laterality Date   • Dilation and evacuation N/A 2016     Procedure: DILATION AND EVACUATION;  Surgeon: Joe Ramirez M.D.;  Location: SURGERY SAME DAY [facility];  Service:      OB History    Para Term  AB SAB TAB Ectopic Multiple Living   2 0 0 0 1 1 0 0 0 0      # Outcome Date GA Lbr Case/2nd Weight Sex Delivery Anes PTL Lv   2 Current            1 SAB 2016 8w0d             Comments: D&C         Social History     Social History   • Marital Status: Single     Spouse Name: N/A   • Number of Children: N/A   • Years of Education: N/A     Occupational History   • Not on file.     Social History Main Topics   • Smoking status: Former Smoker -- 0.25 packs/day for 4 years     Types: Cigarettes   • Smokeless tobacco: Never Used   • Alcohol Use: No      Comment: occ   • Drug Use: No   • Sexual Activity:     Partners: Male     Birth Control/ Protection: Condom     Other Topics Concern   • Not on file     Social History Narrative     Allergies: Review of patient's allergies indicates no known allergies.    Current facility-administered medications:   •  LR infusion, , Intravenous, Continuous, Rox Hodge M.D.  •  fentaNYL (SUBLIMAZE) injection 50 mcg, 50 mcg, Intravenous, Q HOUR PRN, Rox Hodge M.D.  •  fentaNYL (SUBLIMAZE) injection 100 mcg, 100 mcg, Intravenous, Q HOUR PRN, Rox Hodge M.D.  •  hydrOXYzine (ATARAX) tablet 50 mg, 50  "mg, Oral, Q6HRS PRN, Rox Hodge M.D.  •  mag hydrox-al hydrox-simeth (MAALOX PLUS ES or MYLANTA DS) suspension 30 mL, 30 mL, Oral, Q6HRS PRN, Rox Hodge M.D.  •  citric acid-sodium citrate (BICITRA) 334-500 MG/5ML solution 30 mL, 30 mL, Oral, Q6HRS PRN, Rox Hodge M.D.  •  ondansetron (ZOFRAN ODT) dispertab 4 mg, 4 mg, Oral, Q6HRS PRN **OR** ondansetron (ZOFRAN) syringe/vial injection 4 mg, 4 mg, Intravenous, Q6HRS PRN, Rox Hodge M.D.  •  oxytocin (PITOCIN) infusion (for postpartum), 2,000 mL/hr, Intravenous, Once **FOLLOWED BY** oxytocin (PITOCIN) infusion (for postpartum),  mL/hr, Intravenous, Continuous, Rox Hodge M.D.  •  misoprostol (CYTOTEC) tablet 800 mcg, 800 mcg, Rectal, Once PRN, Rox Hodge M.D.  •  methylergonovine (METHERGINE) injection 0.2 mg, 0.2 mg, Intramuscular, Once PRN, Rox Hodge M.D.  •  carboPROST (HEMABATE) injection 250 mcg, 250 mcg, Intramuscular, Once PRN, Rox Hodge M.D.  •  misoprostol (CYTOTEC) tablet 25 mcg, 25 mcg, Vaginal, Once, Rox Hodge M.D.  •  LACTATED RINGERS IV SOLN, , , ,     Prenatal care with [facility] starting at 19w5d with following problems:  Patient Active Problem List    Diagnosis Date Noted   • Labor and delivery indication for care or intervention 04/05/2017   • Amniotic fluid abnormality - RONEL 21.58cm at 22wk 12/13/2016   • Encounter for supervision of normal pregnancy in second trimester 11/21/2016   • Vitamin D deficiency 06/08/2016   • Tobacco use 05/02/2016           Objective:      Blood pressure 124/65, pulse 93, temperature 36.4 °C (97.6 °F), temperature source Temporal, resp. rate 18, height 1.676 m (5' 5.98\"), weight 102.5 kg (225 lb 15.5 oz), last menstrual period 07/06/2016.    General:   no acute distress   Skin:   normal   HEENT:  Sclera clear, anicteric   Lungs:   CTA bilateral   Heart:   S1, S2 normal, no murmur, click, rub or gallop, regular rate and rhythm, brisk carotid upstroke without bruits, " peripheral pulses very brisk, chest is clear without rales or wheezing, no pedal edema, no JVD, no hepatosplenomegaly   Abdomen:   gravid, NT   EFW:  3300g   Pelvis:  adequate with gynecoid pelvis   FHT:  140s BPM   Uterine Size: S=D   Presentations: Cephalic   Cervix:     Dilation: Fingertip    Effacement: 50%    Station:  -2    Consistency: Medium    Position: Posterior     Lab Review  Lab:   Blood type: O     Recent Results (from the past 5880 hour(s))   POCT Pregnancy    Collection Time: 11/03/16  1:38 PM   Result Value Ref Range    POC Urine Pregnancy Test POSITIVE Negative    Internal Control Positive Positive     Internal Control Negative Negative    POCT Urinalysis    Collection Time: 11/21/16  8:40 AM   Result Value Ref Range    POC Color  Negative    POC Appearance  Negative    POC Leukocyte Esterase small Negative    POC Nitrites neg Negative    POC Urobiligen  Negative (0.2) mg/dL    POC Protein neg Negative mg/dL    POC Urine PH 6.0 5.0 - 8.0    POC Blood neg Negative    POC Specific Gravity 1.015 <1.005 - >1.030    POC Ketones neg Negative mg/dL    POC Biliruben  Negative mg/dL    POC Glucose neg Negative mg/dL   Chlamydia/GC PCR Urine or Swab    Collection Time: 11/21/16  8:46 AM   Result Value Ref Range    Source Genital     C. trachomatis by PCR Negative Negative    N. gonorrhoeae by PCR Negative Negative   PREG CNTR PRENATAL PN    Collection Time: 11/30/16 12:23 PM   Result Value Ref Range    WBC 12.8 (H) 4.8 - 10.8 K/uL    RBC 4.31 4.20 - 5.40 M/uL    Hemoglobin 13.0 12.0 - 16.0 g/dL    Hematocrit 39.7 37.0 - 47.0 %    MCV 92.1 81.4 - 97.8 fL    MCH 30.2 27.0 - 33.0 pg    MCHC 32.7 (L) 33.6 - 35.0 g/dL    RDW 40.9 35.9 - 50.0 fL    Platelet Count 243 164 - 446 K/uL    MPV 10.9 9.0 - 12.9 fL    Nucleated RBC 0.00 /100 WBC    NRBC (Absolute) 0.00 K/uL    Neutrophils-Polys 77.70 (H) 44.00 - 72.00 %    Lymphocytes 12.90 (L) 22.00 - 41.00 %    Monocytes 5.50 0.00 - 13.40 %    Eosinophils 2.30 0.00 -  6.90 %    Basophils 0.20 0.00 - 1.80 %    Immature Granulocytes 1.40 (H) 0.00 - 0.90 %    Neutrophils (Absolute) 9.97 (H) 2.00 - 7.15 K/uL    Lymphs (Absolute) 1.65 1.00 - 4.80 K/uL    Monos (Absolute) 0.70 0.00 - 0.85 K/uL    Eos (Absolute) 0.30 0.00 - 0.51 K/uL    Baso (Absolute) 0.03 0.00 - 0.12 K/uL    Immature Granulocytes (abs) 0.18 (H) 0.00 - 0.11 K/uL    Micro Urine Req Microscopic     Color Straw     Character Sl Cloudy (A)     Specific Gravity 1.005 <1.035    Ph 8.0 5.0-8.0    Glucose Negative Negative mg/dL    Ketones Negative Negative mg/dL    Protein Negative Negative mg/dL    Bilirubin Negative Negative    Nitrite Negative Negative    Leukocyte Esterase Negative Negative    Occult Blood Negative Negative    Culture Indicated No UA Culture    Hepatitis B Surface Antigen Negative Negative    Rubella IgG Antibody 17.90 IU/mL    Syphilis, Treponemal Qual Non Reactive Non Reactive   AFP TETRA    Collection Time: 11/30/16 12:23 PM   Result Value Ref Range    AFP Value -Eia 37 ng/mL    AFP MOM Value 0.69     Hcg Value 52250 IU/L    Hcg Mom 1.01     Ue3 Value 2.54 ng/mL    Ue3 Mom 1.13     Interpretation Normal     [age] MICHAEL 26.4 [age]    Gestational Age Based On LNMP     Gestational Age 21.00 weeks    Insulin Dependent Diabetes No     Race      Multiple Pregnancy One     Amelia Value -Eia 140 pg/mL    Amelia Mom Value 0.80     Maternal Weight 202 lbs    Err Maternal Scrn AFP See Note    HIV ANTIBODIES    Collection Time: 11/30/16 12:23 PM   Result Value Ref Range    HIV Ag/Ab Combo Assay Non Reactive Non Reactive   OP PRENATAL PANEL-BLOOD BANK    Collection Time: 11/30/16 12:23 PM   Result Value Ref Range    ABO Grouping Only O     Rh Grouping Only POS     Antibody Screen Scrn NEG    PERIPHERAL SMEAR REVIEW    Collection Time: 11/30/16 12:23 PM   Result Value Ref Range    Peripheral Smear Review see below    DIFFERENTIAL COMMENT    Collection Time: 11/30/16 12:23 PM   Result Value Ref Range     Comments-Diff see below    URINE MICROSCOPIC (W/UA)    Collection Time: 11/30/16 12:23 PM   Result Value Ref Range    WBC 0-2 /hpf    RBC 2-5 (A) /hpf    Epithelial Cells Few /hpf    Amorphous Crystal Present /hpf   CBC WITH DIFFERENTIAL    Collection Time: 12/05/16  4:10 PM   Result Value Ref Range    WBC 12.7 (H) 4.8 - 10.8 K/uL    RBC 4.05 (L) 4.20 - 5.40 M/uL    Hemoglobin 12.2 12.0 - 16.0 g/dL    Hematocrit 36.7 (L) 37.0 - 47.0 %    MCV 90.6 81.4 - 97.8 fL    MCH 30.1 27.0 - 33.0 pg    MCHC 33.2 (L) 33.6 - 35.0 g/dL    RDW 40.4 35.9 - 50.0 fL    Platelet Count 203 164 - 446 K/uL    MPV 10.4 9.0 - 12.9 fL    Neutrophils-Polys 82.80 (H) 44.00 - 72.00 %    Lymphocytes 5.10 (L) 22.00 - 41.00 %    Monocytes 9.20 0.00 - 13.40 %    Eosinophils 0.60 0.00 - 6.90 %    Basophils 0.40 0.00 - 1.80 %    Immature Granulocytes 1.90 (H) 0.00 - 0.90 %    Nucleated RBC 0.00 /100 WBC    Neutrophils (Absolute) 10.51 (H) 2.00 - 7.15 K/uL    Lymphs (Absolute) 0.65 (L) 1.00 - 4.80 K/uL    Monos (Absolute) 1.17 (H) 0.00 - 0.85 K/uL    Eos (Absolute) 0.07 0.00 - 0.51 K/uL    Baso (Absolute) 0.05 0.00 - 0.12 K/uL    Immature Granulocytes (abs) 0.24 (H) 0.00 - 0.11 K/uL    NRBC (Absolute) 0.00 K/uL   CMP    Collection Time: 12/05/16  4:10 PM   Result Value Ref Range    Sodium 129 (L) 135 - 145 mmol/L    Potassium 3.6 3.6 - 5.5 mmol/L    Chloride 101 96 - 112 mmol/L    Co2 17 (L) 20 - 33 mmol/L    Anion Gap 11.0 0.0 - 11.9    Glucose 74 65 - 99 mg/dL    Bun 7 (L) 8 - 22 mg/dL    Creatinine 0.59 0.50 - 1.40 mg/dL    Calcium 9.1 8.5 - 10.5 mg/dL    AST(SGOT) 25 12 - 45 U/L    ALT(SGPT) 28 2 - 50 U/L    Alkaline Phosphatase 59 30 - 99 U/L    Total Bilirubin 0.4 0.1 - 1.5 mg/dL    Albumin 4.2 3.2 - 4.9 g/dL    Total Protein 7.8 6.0 - 8.2 g/dL    Globulin 3.6 (H) 1.9 - 3.5 g/dL    A-G Ratio 1.2 g/dL   ESTIMATED GFR    Collection Time: 12/05/16  4:10 PM   Result Value Ref Range    GFR If African American >60 >60 mL/min/1.73 m 2    GFR If Non  African American >60 >60 mL/min/1.73 m 2   Influenza Rapid    Collection Time: 12/05/16  9:00 PM   Result Value Ref Range    Significant Indicator POS (POS)     Source RESP     Site Nasopharyngeal     Rapid Influenza A-B (A)      Positive for Influenza A antigen;  Negative for Influenza B antigen.     Influenza By PCR, A/B/H1N1    Collection Time: 12/05/16  9:00 PM   Result Value Ref Range    Influenza virus A RNA POSITIVE (A) Negative    Influenza virus B RNA Negative Negative    Influenza A 2009, H1N1, PCR Not Detected Negative   GLUCOSE 1HR GESTATIONAL    Collection Time: 12/27/16 10:23 AM   Result Value Ref Range    Glucose, Post Dose 135 70 - 139 mg/dL   T.PALLIDUM AB EIA    Collection Time: 12/27/16 10:23 AM   Result Value Ref Range    Syphilis, Treponemal Qual Non Reactive Non Reactive   HEMOGLOBIN AND HEMATOCRIT    Collection Time: 12/27/16 10:23 AM   Result Value Ref Range    Hemoglobin 12.0 12.0 - 16.0 g/dL    Hematocrit 36.2 (L) 37.0 - 47.0 %   POCT NST    Collection Time: 02/17/17 11:01 AM   Result Value Ref Range    NST Indications Polyhydramnios     NST Baseline 130bpm     NST Uterine Activity None     NST Acoustic Stimulation None     NST Assessment       Appropriate for GA, mod variability, +accels, no decels    NST Action Necessary      NST Other Data RONEL - 22     NST Return 2x/wk until delivery     NST Read By WILLIAM    POCT Fetal Nonstress Test    Collection Time: 02/21/17  2:55 PM   Result Value Ref Range    NST Indications polyhydramnios     NST Baseline 130     NST Uterine Activity none     NST Acoustic Stimulation none     NST Assessment       Category one, pos accels, no decels, moderate variability.    NST Action Necessary      NST Other Data      NST Return      NST Read By     POCT NST    Collection Time: 02/24/17  2:02 PM   Result Value Ref Range    NST Indications Polyhydramnios     NST Baseline 120s     NST Uterine Activity none     NST Acoustic Stimulation      NST Assessment Cat 1      NST Action Necessary      NST Other Data      NST Return twice weekly     NST Read By [place]    POCT NST    Collection Time: 02/28/17 11:03 AM   Result Value Ref Range    NST Indications polyhydramnios     NST Baseline 120s     NST Uterine Activity none     NST Acoustic Stimulation none     NST Assessment Cat 1     NST Action Necessary      NST Other Data      NST Return twice weekly     NST Read By [place]    POCT Fetal Nonstress Test    Collection Time: 03/03/17  8:20 AM   Result Value Ref Range    NST Indications Polyhydramnios     NST Baseline 130s     NST Uterine Activity Quiet     NST Acoustic Stimulation None     NST Assessment       Reactive NST Cat I FHTs +accels -decels mod variability    NST Action Necessary      NST Other Data      NST Return Cont 2x/wk NSTs 2wk Centering     NST Read By [facility]    POCT Fetal Nonstress Test    Collection Time: 03/10/17  1:27 PM   Result Value Ref Range    NST Indications poly     NST Baseline 120     NST Uterine Activity none     NST Acoustic Stimulation no     NST Assessment reactive, cat1     NST Action Necessary      NST Other Data      NST Return      NST Read By     POCT Fetal Nonstress Test    Collection Time: 03/13/17  2:08 PM   Result Value Ref Range    NST Indications polyhydramnios     NST Baseline      NST Uterine Activity      NST Acoustic Stimulation      NST Assessment category1     NST Action Necessary      NST Other Data      NST Return      NST Read By Candelario    POCT Fetal Nonstress Test    Collection Time: 03/17/17  8:13 AM   Result Value Ref Range    NST Indications Poly     NST Baseline 130     NST Uterine Activity none     NST Acoustic Stimulation yes     NST Assessment reactive, cat 1     NST Action Necessary      NST Other Data      NST Return      NST Read By     GRP B STREP, BY PCR (BARBA BROTH)    Collection Time: 03/17/17 11:44 AM   Result Value Ref Range    Strep Gp B DNA PCR Negative Negative   POCT Fetal Nonstress Test    Collection Time:  03/20/17  2:45 PM   Result Value Ref Range    NST Indications Polyhydramnios     NST Baseline 130s     NST Uterine Activity none     NST Acoustic Stimulation      NST Assessment Cat 1     NST Action Necessary      NST Other Data      NST Return twice weekly     NST Read By [place]    POCT Fetal Nonstress Test    Collection Time: 03/27/17  9:16 AM   Result Value Ref Range    NST Indications      NST Baseline      NST Uterine Activity      NST Acoustic Stimulation      NST Assessment      NST Action Necessary      NST Other Data      NST Return      NST Read By     POCT Fetal Nonstress Test    Collection Time: 03/31/17  8:23 AM   Result Value Ref Range    NST Indications polyhydramnios     NST Baseline 140     NST Uterine Activity none     NST Acoustic Stimulation none     NST Assessment reactive     NST Action Necessary      NST Other Data      NST Return      NST Read By     POCT Urinalysis    Collection Time: 03/31/17  9:04 AM   Result Value Ref Range    POC Color  Negative    POC Appearance  Negative    POC Leukocyte Esterase negative Negative    POC Nitrites negative Negative    POC Urobiligen  Negative (0.2) mg/dL    POC Protein trace Negative mg/dL    POC Urine PH 6.0 5.0 - 8.0    POC Blood negative Negative    POC Specific Gravity 1.020 <1.005 - >1.030    POC Ketones negative Negative mg/dL    POC Biliruben  Negative mg/dL    POC Glucose negative Negative mg/dL   POCT Fetal Nonstress Test    Collection Time: 04/03/17  1:04 PM   Result Value Ref Range    NST Indications Polyhydramnios     NST Baseline 120s     NST Uterine Activity irregular     NST Acoustic Stimulation      NST Assessment Cat 1     NST Action Necessary      NST Other Data      NST Return      NST Read By [place]         Assessment:   Kyara Olmstead Robert at 39w0d  Labor status: Not in labor.  Obstetrical history significant for   Patient Active Problem List    Diagnosis Date Noted   • Labor and delivery indication for care or  intervention 2017   • Amniotic fluid abnormality - RONEL 21.58cm at 22wk 2016   • Encounter for supervision of normal pregnancy in second trimester 2016   • Vitamin D deficiency 2016   • Tobacco use 2016   .      Plan:     Admit to L&D  GBS negative  Cervix unfavorable, Giordano score 3  Cytotec now  Pitocin when ripened  Anticipate       Rox Hodge M.D.

## 2017-04-05 NOTE — IP AVS SNAPSHOT
EchoFirst Access Code: Activation code not generated  Current EchoFirst Status: Active    [employer]  A secure, online tool to manage your health information     [employer]’s EchoFirst® is a secure, online tool that connects you to your personalized health information from the privacy of your home -- day or night - making it very easy for you to manage your healthcare. Once the activation process is completed, you can even access your medical information using the EchoFirst brock, which is available for free in the Apple Brock store or Google Play store.     EchoFirst provides the following levels of access (as shown below):   My Chart Features   [facility] Primary Care Doctor [facility]  Specialists [facility]  Urgent  Care Non-[facility]  Primary Care  Doctor   Email your healthcare team securely and privately 24/7 X X X X   Manage appointments: schedule your next appointment; view details of past/upcoming appointments X      Request prescription refills. X      View recent personal medical records, including lab and immunizations X X X X   View health record, including health history, allergies, medications X X X X   Read reports about your outpatient visits, procedures, consult and ER notes X X X X   See your discharge summary, which is a recap of your hospital and/or ER visit that includes your diagnosis, lab results, and care plan. X X       How to register for EchoFirst:  1. Go to  [email or web address].  2. Click on the Sign Up Now box, which takes you to the New Member Sign Up page. You will need to provide the following information:  a. Enter your EchoFirst Access Code exactly as it appears at the top of this page. (You will not need to use this code after you’ve completed the sign-up process. If you do not sign up before the expiration date, you must request a new code.)   b. Enter your date of birth.   c. Enter your home email address.   d. Click Submit, and follow the next screen’s instructions.  3. Create a EchoFirst ID. This will  be your [employer] login ID and cannot be changed, so think of one that is secure and easy to remember.  4. Create a [employer]er password. You can change your password at any time.  5. Enter your Password Reset Question and Answer. This can be used at a later time if you forget your password.   6. Enter your e-mail address. This allows you to receive e-mail notifications when new information is available in Kixer.  7. Click Sign Up. You can now view your health information.    For assistance activating your [employer] account, call [phone]

## 2017-04-05 NOTE — IP AVS SNAPSHOT
Home Care Instructions                                                                                                                Kyara Jones   MRN: [id number]    Department:  POST PARTUM 31   2017           Your appointments     2017 11:15 AM   Post Partum  Check with SHELDON LUKE   The Pregnancy [address]  [facility] [address]   [phone]            May 24, 2017  2:30 PM   Post Partum with KAMRAN Ghotra   The [address]  [facility] [address]   [phone]              Follow-up Information     1. Follow up with Pregnancy HARSH Miranda. Schedule an appointment as soon as possible for a visit in 1 week.    Specialty:  OB/Gyn    Why:  For wound re-check    Contact information    [address] [place] [id number]  [phone]         I assume responsibility for securing a follow-up [place] Screening blood test on my baby within the specified date range.    -                  Discharge Instructions       POSTPARTUM DISCHARGE INSTRUCTIONS FOR MOM    YOB: 1990   Age: 26 y.o.               Admit Date: 2017     Discharge Date: 2017  Attending Doctor:  Pushpa Eddy M.D.                  Allergies:  Review of patient's allergies indicates no known allergies.    Discharged to home by car. Discharged via wheelchair, hospital escort: Yes.  Special equipment needed: Not Applicable  Belongings with: Personal  Be sure to schedule a follow-up appointment with your primary care doctor or any specialists as instructed.     Discharge Plan:   Diet Plan: Discussed  Activity Level: Discussed  Confirmed Follow up Appointment: Appointment Scheduled  Confirmed Symptoms Management: Discussed  Medication Reconciliation Updated: Yes  Influenza Vaccine Indication: Indicated: 9 to 64 years of age  Influenza Vaccine Given - only chart on this line when given: Influenza Vaccine Given (See MAR)    REASONS TO CALL  "YOUR OBSTETRICIAN:  1.   Persistent fever or shaking chills (Temperature higher than 100.4)  2.   Heavy bleeding (soaking more than 1 pad per hour); Passing clots  3.   Foul odor from vagina  4.   Mastitis (Breast infection; breast pain, chills, fever, redness)  5.   Urinary pain, burning or frequency  6.   Episiotomy infection  7.   Abdominal incision infection  8.   Severe depression longer than 24 hours    HAND WASHING  · Prior to handling the baby.  · Before breastfeeding or bottle feeding baby.  · After using the bathroom or changing the baby's diaper.    WOUND CARE  Ask your physician for additional care instructions.  In general:    ·  Incision:      · Keep clean and dry.    · Do NOT lift anything heavier than your baby for up to 6 weeks.    · There should not be any opening or pus.      VAGINAL CARE  · Nothing inside vagina for 6 weeks: no sexual intercourse, tampons or douching.  · Bleeding may continue for 2-4 weeks.  Amount may vary.    · Call your physician for heavy bleeding which means soaking more than 1 pad per hour    BIRTH CONTROL  · It is possible to become pregnant at any time after delivery and while breastfeeding.  · Plan to discuss a method of birth control with your physician at your follow up visit. visit.    DIET AND ELIMINATION  · Eating more fiber (bran cereal, fruits, and vegetables) and drinking plenty of fluids will help to avoid constipation.  · Urinary frequency after childbirth is normal.    POSTPARTUM BLUES  During the first few days after birth, you may experience a sense of the \"blues\" which may include impatience, irritability or even crying.  These feeling come and go quickly.  However, as many as 1 in 10 women experience emotional symptoms known as postpartum depression.    Postpartum depression:  May start as early as the second or third day after delivery or take several weeks or months to develop.  Symptoms of \"blues\" are present, but are more intense:  Crying " "spells; loss of appetite; feelings of hopelessness or loss of control; fear of touching the baby; over concern or no concern at all about the baby; little or no concern about your own appearance/caring for yourself; and/or inability to sleep or excessive sleeping.  Contact your physician if you are experiencing any of these symptoms.    Crisis Hotline:  · [place] Crisis Hotline:  6-217-PLHIRER  Or 1-174.753.4527  · [place] Crisis Hotline:  1-760.417.1234  Or 784-966-9444    PREVENTING SHAKEN BABY:  If you are angry or stressed, PUT THE BABY IN THE CRIB, step away, take some deep breaths, and wait until you are calm to care for the baby.  DO NOT SHAKE THE BABY.  You are not alone, call a supporter for help.    · Crisis Call Center 24/7 crisis line 179-382-5192 or 1-229.240.8627  · You can also text them, text \"ANSWER\" to 220201    QUIT SMOKING/TOBACCO USE:  I understand the use of any tobacco products increases my chance of suffering from future heart disease and could cause other illnesses which may shorten my life. Quitting the use of tobacco products is the single most important thing I can do to improve my health. For further information on smoking / tobacco cessation call a Toll Free Quit Line at 1-512.211.3864 (*National [employer] [place]) or 1-999.577.2949 (American Lung Association) or you can access the web based program at www.lungusa.org.    · [place] Tobacco Users Help Line:  (457) 924-2865       Toll Free: 1-337.645.8840  · Quit Tobacco Program [facility] (295)976-9772    DEPRESSION / SUICIDE RISK:  As you are discharged from this [facility], it is important to learn how to keep safe from harming yourself.    Recognize the warning signs:  · Abrupt changes in personality, positive or negative- including increase in energy   · Giving away possessions  · Change in eating patterns- significant weight changes-  positive or negative  · Change in sleeping patterns- unable to " sleep or sleeping all the time   · Unwillingness or inability to communicate  · Depression  · Unusual sadness, discouragement and loneliness  · Talk of wanting to die  · Neglect of personal appearance   · Rebelliousness- reckless behavior  · Withdrawal from people/activities they love  · Confusion- inability to concentrate     If you or a loved one observes any of these behaviors or has concerns about self-harm, here's what you can do:  · Talk about it- your feelings and reasons for harming yourself  · Remove any means that you might use to hurt yourself (examples: pills, rope, extension cords, firearm)  · Get professional help from the community (Mental Health, Substance Abuse, psychological counseling)  · Do not be alone:Call your Safe Contact- someone whom you trust who will be there for you.  · Call your local CRISIS HOTLINE 533-4531 or 806-272-1058  · Call your local Children's Mobile Crisis Response Team [place] (297) 965-6875 or www."InvierteMe,SL"  · Call the toll free National Suicide Prevention Hotlines   · National Suicide Prevention Lifeline 953-312-EZWC (9235)  · National Hope Line Network 800-SUICIDE (668-9706)    DISCHARGE SURVEY:  Thank you for choosing [facility].  We hope we provided you with very good care.  You may be receiving a survey in the mail.  Please fill it out.  Your opinion is valuable to us.    ADDITIONAL EDUCATIONAL MATERIALS GIVEN TO PATIENT:        My signature on this form indicates that:  1.  I have reviewed and understand the above information  2.  My questions regarding this information have been answered to my satisfaction.  3.  I have formulated a plan with my discharge nurse to obtain my prescribed medication for home.         Discharge Medication Instructions:    Below are the medications your physician expects you to take upon discharge:    Review all your home medications and newly ordered medications with your doctor and/or pharmacist. Follow medication  instructions as directed by your doctor and/or pharmacist.    Please keep your medication list with you and share with your physician.               Medication List      START taking these medications        Instructions     MG Caps   Last time this was given:  100 mg on 4/8/2017  8:53 AM    Take 100 mg by mouth 2 times a day as needed for Constipation.   Dose:  100 mg       hydrocodone-acetaminophen 5-325 MG Tabs per tablet   Last time this was given:  1 Tab on 4/9/2017  3:50 AM   Commonly known as:  NORCO    Take 1 Tab by mouth every four hours as needed (for Moderate Pain (Pain Scale 4-6) after delivery).   Dose:  1 Tab       ibuprofen 600 MG Tabs   Last time this was given:  600 mg on 4/9/2017  3:50 AM   Commonly known as:  MOTRIN    Take 1 Tab by mouth every 6 hours as needed (For cramping after delivery; do not give if patient is receiving ketorolac (Toradol)).   Dose:  600 mg         CONTINUE taking these medications        Instructions    Cholecalciferol 1000 UNIT Caps    Take 1 Cap by mouth every day.   Dose:  1 Cap       PRENATAL 1 PO    Take  by mouth.               Crisis Hotline:     [place] Crisis Hotline:  5-855-JBXLREA or 1-981.162.4876    [place] Crisis Hotline:    1-897.261.5511 or 796-836-5655        Disclaimer           _____________________________________                     __________       ________       Patient/Mother Signature or Legal                          Date                   Time

## 2017-04-05 NOTE — PROGRESS NOTES
EDC 17 38.6 weeks pt is here for Op Gel induction for the reason of polyhydramnios. External monitors applied Michael cnm was called report given and order received.   SVE done FT 50% floating.    SVE done reactive strip and prostin gel was inseted. Pt is very comfortable and does not feel any UCs.X8 in one hour.  2315 pt does not feel any contractions very comfortable. Reactive strip. Michael was called report given and order received to discharge pt home.

## 2017-04-06 PROCEDURE — A9270 NON-COVERED ITEM OR SERVICE: HCPCS | Performed by: ANESTHESIOLOGY

## 2017-04-06 PROCEDURE — 700111 HCHG RX REV CODE 636 W/ 250 OVERRIDE (IP)

## 2017-04-06 PROCEDURE — 3E033VJ INTRODUCTION OF OTHER HORMONE INTO PERIPHERAL VEIN, PERCUTANEOUS APPROACH: ICD-10-PCS | Performed by: OBSTETRICS & GYNECOLOGY

## 2017-04-06 PROCEDURE — 303615 HCHG EPIDURAL/SPINAL ANESTHESIA FOR LABOR

## 2017-04-06 PROCEDURE — 700102 HCHG RX REV CODE 250 W/ 637 OVERRIDE(OP): Performed by: ANESTHESIOLOGY

## 2017-04-06 PROCEDURE — 90471 IMMUNIZATION ADMIN: CPT

## 2017-04-06 PROCEDURE — 59514 CESAREAN DELIVERY ONLY: CPT

## 2017-04-06 PROCEDURE — 305385 HCHG SURGICAL SERVICES 1/4 HOUR

## 2017-04-06 PROCEDURE — 700111 HCHG RX REV CODE 636 W/ 250 OVERRIDE (IP): Performed by: ANESTHESIOLOGY

## 2017-04-06 PROCEDURE — 700111 HCHG RX REV CODE 636 W/ 250 OVERRIDE (IP): Performed by: FAMILY MEDICINE

## 2017-04-06 PROCEDURE — 700111 HCHG RX REV CODE 636 W/ 250 OVERRIDE (IP): Performed by: OBSTETRICS & GYNECOLOGY

## 2017-04-06 PROCEDURE — 304964 HCHG RECOVERY ROOM TIME 1HR

## 2017-04-06 PROCEDURE — 700101 HCHG RX REV CODE 250: Mod: JW

## 2017-04-06 PROCEDURE — 36415 COLL VENOUS BLD VENIPUNCTURE: CPT

## 2017-04-06 PROCEDURE — 306828 HCHG ANES-TIME GENERAL: Performed by: OBSTETRICS & GYNECOLOGY

## 2017-04-06 PROCEDURE — 3E0234Z INTRODUCTION OF SERUM, TOXOID AND VACCINE INTO MUSCLE, PERCUTANEOUS APPROACH: ICD-10-PCS | Performed by: OBSTETRICS & GYNECOLOGY

## 2017-04-06 PROCEDURE — 90686 IIV4 VACC NO PRSV 0.5 ML IM: CPT | Performed by: OBSTETRICS & GYNECOLOGY

## 2017-04-06 PROCEDURE — 770002 HCHG ROOM/CARE - OB PRIVATE (112)

## 2017-04-06 PROCEDURE — 10907ZC DRAINAGE OF AMNIOTIC FLUID, THERAPEUTIC FROM PRODUCTS OF CONCEPTION, VIA NATURAL OR ARTIFICIAL OPENING: ICD-10-PCS | Performed by: OBSTETRICS & GYNECOLOGY

## 2017-04-06 RX ORDER — ONDANSETRON 2 MG/ML
4 INJECTION INTRAMUSCULAR; INTRAVENOUS EVERY 6 HOURS PRN
Status: DISCONTINUED | OUTPATIENT
Start: 2017-04-06 | End: 2017-04-09 | Stop reason: HOSPADM

## 2017-04-06 RX ORDER — DIPHENHYDRAMINE HYDROCHLORIDE 50 MG/ML
25 INJECTION INTRAMUSCULAR; INTRAVENOUS EVERY 6 HOURS PRN
Status: DISCONTINUED | OUTPATIENT
Start: 2017-04-06 | End: 2017-04-09 | Stop reason: HOSPADM

## 2017-04-06 RX ORDER — KETOROLAC TROMETHAMINE 30 MG/ML
30 INJECTION, SOLUTION INTRAMUSCULAR; INTRAVENOUS EVERY 6 HOURS
Status: CANCELLED | OUTPATIENT
Start: 2017-04-06 | End: 2017-04-07

## 2017-04-06 RX ORDER — OXYCODONE AND ACETAMINOPHEN 10; 325 MG/1; MG/1
1 TABLET ORAL EVERY 4 HOURS PRN
Status: DISCONTINUED | OUTPATIENT
Start: 2017-04-06 | End: 2017-04-07

## 2017-04-06 RX ORDER — MISOPROSTOL 200 UG/1
600 TABLET ORAL
Status: DISCONTINUED | OUTPATIENT
Start: 2017-04-06 | End: 2017-04-09 | Stop reason: HOSPADM

## 2017-04-06 RX ORDER — KETOROLAC TROMETHAMINE 30 MG/ML
30 INJECTION, SOLUTION INTRAMUSCULAR; INTRAVENOUS EVERY 6 HOURS
Status: COMPLETED | OUTPATIENT
Start: 2017-04-06 | End: 2017-04-07

## 2017-04-06 RX ORDER — DOCUSATE SODIUM 100 MG/1
100 CAPSULE, LIQUID FILLED ORAL 2 TIMES DAILY PRN
Status: DISCONTINUED | OUTPATIENT
Start: 2017-04-06 | End: 2017-04-09 | Stop reason: HOSPADM

## 2017-04-06 RX ORDER — ONDANSETRON 2 MG/ML
4 INJECTION INTRAMUSCULAR; INTRAVENOUS EVERY 6 HOURS PRN
Status: DISCONTINUED | OUTPATIENT
Start: 2017-04-06 | End: 2017-04-07

## 2017-04-06 RX ORDER — NALOXONE HYDROCHLORIDE 0.4 MG/ML
0.1 INJECTION, SOLUTION INTRAMUSCULAR; INTRAVENOUS; SUBCUTANEOUS PRN
Status: DISCONTINUED | OUTPATIENT
Start: 2017-04-06 | End: 2017-04-07

## 2017-04-06 RX ORDER — METOCLOPRAMIDE HYDROCHLORIDE 5 MG/ML
10 INJECTION INTRAMUSCULAR; INTRAVENOUS EVERY 6 HOURS
Status: DISCONTINUED | OUTPATIENT
Start: 2017-04-06 | End: 2017-04-06 | Stop reason: HOSPADM

## 2017-04-06 RX ORDER — METHYLERGONOVINE MALEATE 0.2 MG/ML
0.2 INJECTION INTRAVENOUS
Status: DISCONTINUED | OUTPATIENT
Start: 2017-04-06 | End: 2017-04-09 | Stop reason: HOSPADM

## 2017-04-06 RX ORDER — SIMETHICONE 80 MG
80 TABLET,CHEWABLE ORAL 4 TIMES DAILY PRN
Status: DISCONTINUED | OUTPATIENT
Start: 2017-04-06 | End: 2017-04-09 | Stop reason: HOSPADM

## 2017-04-06 RX ORDER — BISACODYL 10 MG
10 SUPPOSITORY, RECTAL RECTAL PRN
Status: DISCONTINUED | OUTPATIENT
Start: 2017-04-06 | End: 2017-04-09 | Stop reason: HOSPADM

## 2017-04-06 RX ORDER — CARBOPROST TROMETHAMINE 250 UG/ML
250 INJECTION, SOLUTION INTRAMUSCULAR
Status: DISCONTINUED | OUTPATIENT
Start: 2017-04-06 | End: 2017-04-09 | Stop reason: HOSPADM

## 2017-04-06 RX ORDER — OXYCODONE HYDROCHLORIDE AND ACETAMINOPHEN 5; 325 MG/1; MG/1
1 TABLET ORAL EVERY 4 HOURS PRN
Status: DISCONTINUED | OUTPATIENT
Start: 2017-04-06 | End: 2017-04-07

## 2017-04-06 RX ORDER — SODIUM CHLORIDE, SODIUM LACTATE, POTASSIUM CHLORIDE, CALCIUM CHLORIDE 600; 310; 30; 20 MG/100ML; MG/100ML; MG/100ML; MG/100ML
INJECTION, SOLUTION INTRAVENOUS PRN
Status: DISCONTINUED | OUTPATIENT
Start: 2017-04-06 | End: 2017-04-09 | Stop reason: HOSPADM

## 2017-04-06 RX ORDER — ONDANSETRON 4 MG/1
4 TABLET, ORALLY DISINTEGRATING ORAL EVERY 6 HOURS PRN
Status: DISCONTINUED | OUTPATIENT
Start: 2017-04-06 | End: 2017-04-09 | Stop reason: HOSPADM

## 2017-04-06 RX ORDER — SODIUM CHLORIDE, SODIUM LACTATE, POTASSIUM CHLORIDE, CALCIUM CHLORIDE 600; 310; 30; 20 MG/100ML; MG/100ML; MG/100ML; MG/100ML
INJECTION, SOLUTION INTRAVENOUS
Status: COMPLETED
Start: 2017-04-06 | End: 2017-04-06

## 2017-04-06 RX ORDER — DIPHENHYDRAMINE HCL 25 MG
25 TABLET ORAL EVERY 6 HOURS PRN
Status: DISCONTINUED | OUTPATIENT
Start: 2017-04-06 | End: 2017-04-09 | Stop reason: HOSPADM

## 2017-04-06 RX ORDER — VITAMIN A ACETATE, BETA CAROTENE, ASCORBIC ACID, CHOLECALCIFEROL, .ALPHA.-TOCOPHEROL ACETATE, DL-, THIAMINE MONONITRATE, RIBOFLAVIN, NIACINAMIDE, PYRIDOXINE HYDROCHLORIDE, FOLIC ACID, CYANOCOBALAMIN, CALCIUM CARBONATE, FERROUS FUMARATE, ZINC OXIDE, CUPRIC OXIDE 3080; 12; 120; 400; 1; 1.84; 3; 20; 22; 920; 25; 200; 27; 10; 2 [IU]/1; UG/1; MG/1; [IU]/1; MG/1; MG/1; MG/1; MG/1; MG/1; [IU]/1; MG/1; MG/1; MG/1; MG/1; MG/1
1 TABLET, FILM COATED ORAL EVERY MORNING
Status: DISCONTINUED | OUTPATIENT
Start: 2017-04-06 | End: 2017-04-09 | Stop reason: HOSPADM

## 2017-04-06 RX ORDER — DIPHENHYDRAMINE HYDROCHLORIDE 50 MG/ML
25 INJECTION INTRAMUSCULAR; INTRAVENOUS EVERY 6 HOURS PRN
Status: DISCONTINUED | OUTPATIENT
Start: 2017-04-06 | End: 2017-04-07

## 2017-04-06 RX ADMIN — Medication 125 ML/HR: at 13:15

## 2017-04-06 RX ADMIN — FENTANYL CITRATE 100 MCG: 50 INJECTION, SOLUTION INTRAMUSCULAR; INTRAVENOUS at 06:27

## 2017-04-06 RX ADMIN — SODIUM CHLORIDE, POTASSIUM CHLORIDE, SODIUM LACTATE AND CALCIUM CHLORIDE 1000 ML: 600; 310; 30; 20 INJECTION, SOLUTION INTRAVENOUS at 11:08

## 2017-04-06 RX ADMIN — INFLUENZA A VIRUS A/CALIFORNIA/7/2009 X-179A (H1N1) ANTIGEN (FORMALDEHYDE INACTIVATED), INFLUENZA A VIRUS A/HONG KONG/4801/2014 X-263B (H3N2) ANTIGEN (FORMALDEHYDE INACTIVATED), INFLUENZA B VIRUS B/PHUKET/3073/2013 ANTIGEN (FORMALDEHYDE INACTIVATED), AND INFLUENZA B VIRUS B/BRISBANE/60/2008 ANTIGEN (FORMALDEHYDE INACTIVATED) 0.5 ML: 15; 15; 15; 15 INJECTION, SUSPENSION INTRAMUSCULAR at 18:15

## 2017-04-06 RX ADMIN — KETOROLAC TROMETHAMINE 30 MG: 30 INJECTION, SOLUTION INTRAMUSCULAR; INTRAVENOUS at 23:57

## 2017-04-06 RX ADMIN — FAMOTIDINE 20 MG: 10 INJECTION INTRAVENOUS at 10:17

## 2017-04-06 RX ADMIN — METOCLOPRAMIDE 10 MG: 5 INJECTION, SOLUTION INTRAMUSCULAR; INTRAVENOUS at 10:16

## 2017-04-06 RX ADMIN — SODIUM CHLORIDE, POTASSIUM CHLORIDE, SODIUM LACTATE AND CALCIUM CHLORIDE 1000 ML: 600; 310; 30; 20 INJECTION, SOLUTION INTRAVENOUS at 10:23

## 2017-04-06 RX ADMIN — SODIUM CITRATE AND CITRIC ACID MONOHYDRATE 30 ML: 500; 334 SOLUTION ORAL at 10:16

## 2017-04-06 RX ADMIN — KETOROLAC TROMETHAMINE 30 MG: 30 INJECTION, SOLUTION INTRAMUSCULAR; INTRAVENOUS at 18:23

## 2017-04-06 RX ADMIN — FENTANYL CITRATE 100 MCG: 50 INJECTION, SOLUTION INTRAMUSCULAR; INTRAVENOUS at 05:24

## 2017-04-06 ASSESSMENT — PAIN SCALES - GENERAL
PAINLEVEL_OUTOF10: 3
PAINLEVEL_OUTOF10: 0
PAINLEVEL_OUTOF10: 0

## 2017-04-06 NOTE — PROGRESS NOTES
0700-Report received from TINA Hernandez, RN at the bedside. Family x2 present. POC discussed. Will continue to monitor.   0730-Dr. Hodge at the bedside. SVE /2. IUPC replaced.   0800-Order received from Dr. Hodge and Dr. Huntley to prepare patient for  section.   0810-Order received from Dr. Hodge to stop pitocin.   1128-Pt taken to OR, see OR charting.

## 2017-04-06 NOTE — PROGRESS NOTES
Pt still breathing well through UCs, so d/w pt plan to AROM and pt happy with plan.     Cervix: 3/60/-2, mid, soft. Amniotomy performed with FSE with good return of clear fluid. IUPC placed easily.   NST: Cat 1  TOCO: UCs q 2-4 min    A/P: IUP at 39w0d - continue IOL with pitocin, pain control prn, anticipate .

## 2017-04-06 NOTE — CARE PLAN
Problem: Altered physiologic condition related to postoperative  delivery  Goal: Patient physiologically stable as evidenced by normal lochia, palpable uterine involution and vital signs within normal limits  Outcome: PROGRESSING AS EXPECTED  Fundus firm, lochia light,blood pressure and other vitals stable. Patient intake of fluids wnl.    Problem: Alteration in comfort related to surgical incision and/or after birth pains  Goal: Patient verbalizes acceptable pain level  Outcome: PROGRESSING AS EXPECTED  Pain level rechecked within 1 hour and patient states pain medicine is effective with pain control.patient demonstrates improved ease of movement

## 2017-04-06 NOTE — CARE PLAN
Problem: Pain  Goal: Alleviation of Pain or a reduction in pain to the patient’s comfort goal  Outcome: PROGRESSING AS EXPECTED  Pt is currently coping well with pain, will let nurse know if intervention is needed.

## 2017-04-06 NOTE — OR SURGEON
Immediate Post-Operative Note      PreOp Diagnosis: IUP @ 39+1/7 wks with polyhydramnios, failed IOL    PostOp Diagnosis: Same    Procedure(s):  PRIMARY C SECTION - Wound Class: Clean Contaminated    Surgeon(s):  Keke Huntley M.D.    Anesthesiologist/Type of Anesthesia:  Anesthesiologist: Chio Oh M.D./Spinal    Surgical Staff:  Circulator: Cyndi Lowry R.N.  Scrub Person: Svetlana SPENCE&ETTA Baby  Nurse: Jennifer Salcido R.N.    Specimen: None    Estimated Blood Loss: 500cc    Findings: viable male infant in cephalic presentation with apgars 8 and 9, normal uterus, tubes, ovaries    Complications: None        4/6/2017 12:37 PM Keke Huntley

## 2017-04-06 NOTE — CARE PLAN
Problem: Knowledge Deficit  Goal: Patient/Support person demonstrates understanding regarding the progression of labor, available options and participates in decision-making process  Outcome: PROGRESSING AS EXPECTED  Pt and FOB ask appropriate questions about labor & what to expect. RN in room to educate.     Problem: Pain  Goal: Alleviation of Pain or a reduction in pain to the patient’s comfort goal  Outcome: PROGRESSING AS EXPECTED  Pt's pain mgmt plan during labor assessed. Pt would like to try natural delivery, but knows her options of Fentanyl, epidural, breathing techniques, repositioning, etc.

## 2017-04-06 NOTE — PROGRESS NOTES
"S: Pt comfortable.      O:Blood pressure 127/77, pulse 68, temperature 36.7 °C (98.1 °F), temperature source Temporal, resp. rate 18, height 1.676 m (5' 5.98\"), weight 102.5 kg (225 lb 15.5 oz), last menstrual period 2016.     Osino - irregular (pitocin turned off)  EFM - 130s, moderate variability, + accels  Cx - 4 cm/80%/-2    A/P: 25 yo  @ 39+1/7 wks admitted yesterday for IOL due to polyhydramnios.  Received PG gel, cytotec, pitocin, and amniotomy.  No cervical change over 5 hours despite adequate MVUs by IUPC, now with cervical swelling.  Pubic arch narrow.  Suspect CPD.  Fetal status reassuring.    Discussed with the patient indications for  delivery. The patient voiced understanding of indications for  section at this time.    Discussed with the patient the risks of  delivery. The risks include bleeding, infection, transfusion, emergency hysterectomy to control bleeding, damage to surrounding organs (bowel, bladder, ureters, nerves, vessels), need for repair or future surgery, fetal injury, unexpected pathology, anesthesia risks, and rarely death.  I also discussed with the patient the risk of wound infection, wound breakdown, and scarring. We discussed that these risks are greater in people that have diabetes or obesity.    The patient had the opportunity to ask questions regarding the procedure. All questions answered to the patient's satisfaction. Plan to proceed with  delivery.   "

## 2017-04-06 NOTE — PROGRESS NOTES
26 y.o., , EDC 17 (39 wks)  Polyhydramnios     -Report from SANJEEV Nowak RN. Pt resting in bed, states she only feels pressure with her contractions. Discussed with patient the benefit of frequent repositioning, pt would like to use birthing ball. POC discussed, questions/needs addressed.   -report to ROHSNI epstein RN.

## 2017-04-06 NOTE — CARE PLAN
Problem: Risk for Infection, Impaired Wound Healing  Goal: Remain free from signs and symptoms of infection  Outcome: PROGRESSING AS EXPECTED  Pt shows no s/s of infection. VSS.

## 2017-04-06 NOTE — PROGRESS NOTES
Patient transferred from l and d  pacu to  319. Patient denies pain med orders released in epic. Assessment done lochia light fundus firm . Dressing dry and intact.Family at bedside. Patient and family oriented to room and post op and postpartum routine.Patient oriented to [employer] system.Herbert patent with clear medium yellow urine. I V patent ,without redness or drainage with 2nd bag pitocin running at 125/hour on pump. Sequentials on and incentive spirometer at bedside. Any questions answered. Baby held by dad.bands checked .

## 2017-04-07 LAB
ERYTHROCYTE [DISTWIDTH] IN BLOOD BY AUTOMATED COUNT: 40.8 FL (ref 35.9–50)
HCT VFR BLD AUTO: 30.9 % (ref 37–47)
HGB BLD-MCNC: 10.2 G/DL (ref 12–16)
MCH RBC QN AUTO: 28.5 PG (ref 27–33)
MCHC RBC AUTO-ENTMCNC: 33 G/DL (ref 33.6–35)
MCV RBC AUTO: 86.3 FL (ref 81.4–97.8)
PLATELET # BLD AUTO: 188 K/UL (ref 164–446)
PMV BLD AUTO: 10.4 FL (ref 9–12.9)
RBC # BLD AUTO: 3.58 M/UL (ref 4.2–5.4)
WBC # BLD AUTO: 16 K/UL (ref 4.8–10.8)

## 2017-04-07 PROCEDURE — 700102 HCHG RX REV CODE 250 W/ 637 OVERRIDE(OP): Performed by: FAMILY MEDICINE

## 2017-04-07 PROCEDURE — 700112 HCHG RX REV CODE 229: Performed by: FAMILY MEDICINE

## 2017-04-07 PROCEDURE — A9270 NON-COVERED ITEM OR SERVICE: HCPCS | Performed by: FAMILY MEDICINE

## 2017-04-07 PROCEDURE — 85027 COMPLETE CBC AUTOMATED: CPT

## 2017-04-07 PROCEDURE — 770002 HCHG ROOM/CARE - OB PRIVATE (112)

## 2017-04-07 PROCEDURE — 36415 COLL VENOUS BLD VENIPUNCTURE: CPT

## 2017-04-07 PROCEDURE — 700111 HCHG RX REV CODE 636 W/ 250 OVERRIDE (IP): Performed by: ANESTHESIOLOGY

## 2017-04-07 PROCEDURE — A9270 NON-COVERED ITEM OR SERVICE: HCPCS | Performed by: ANESTHESIOLOGY

## 2017-04-07 PROCEDURE — 700102 HCHG RX REV CODE 250 W/ 637 OVERRIDE(OP): Performed by: ANESTHESIOLOGY

## 2017-04-07 RX ORDER — IBUPROFEN 600 MG/1
600 TABLET ORAL EVERY 6 HOURS PRN
Status: DISCONTINUED | OUTPATIENT
Start: 2017-04-07 | End: 2017-04-09 | Stop reason: HOSPADM

## 2017-04-07 RX ORDER — HYDROCODONE BITARTRATE AND ACETAMINOPHEN 5; 325 MG/1; MG/1
1 TABLET ORAL EVERY 4 HOURS PRN
Status: DISCONTINUED | OUTPATIENT
Start: 2017-04-07 | End: 2017-04-09 | Stop reason: HOSPADM

## 2017-04-07 RX ORDER — ACETAMINOPHEN 325 MG/1
325 TABLET ORAL EVERY 4 HOURS PRN
Status: DISCONTINUED | OUTPATIENT
Start: 2017-04-07 | End: 2017-04-09 | Stop reason: HOSPADM

## 2017-04-07 RX ORDER — HYDROCODONE BITARTRATE AND ACETAMINOPHEN 5; 325 MG/1; MG/1
2 TABLET ORAL EVERY 4 HOURS PRN
Status: DISCONTINUED | OUTPATIENT
Start: 2017-04-07 | End: 2017-04-09 | Stop reason: HOSPADM

## 2017-04-07 RX ADMIN — DOCUSATE SODIUM 100 MG: 100 CAPSULE ORAL at 08:02

## 2017-04-07 RX ADMIN — OXYCODONE HYDROCHLORIDE AND ACETAMINOPHEN 1 TABLET: 5; 325 TABLET ORAL at 10:05

## 2017-04-07 RX ADMIN — Medication 1 TABLET: at 08:02

## 2017-04-07 RX ADMIN — KETOROLAC TROMETHAMINE 30 MG: 30 INJECTION, SOLUTION INTRAMUSCULAR; INTRAVENOUS at 13:11

## 2017-04-07 RX ADMIN — KETOROLAC TROMETHAMINE 30 MG: 30 INJECTION, SOLUTION INTRAMUSCULAR; INTRAVENOUS at 05:59

## 2017-04-07 ASSESSMENT — PAIN SCALES - GENERAL
PAINLEVEL_OUTOF10: 3
PAINLEVEL_OUTOF10: 5
PAINLEVEL_OUTOF10: 1
PAINLEVEL_OUTOF10: 2
PAINLEVEL_OUTOF10: 0

## 2017-04-07 NOTE — PROGRESS NOTES
Post Partum Progress Note    Name:   Kyara Jones   Date/Time:  2017 - 6:22 AM  Chief Admitting Dx:  Pregnancy  Labor and delivery indication for care or intervention  P c/s for failure to progress  Delivery Type:   for failure to progress - failed IOL for polyhydramnios  Post-Op/Post Partum Days #:  1    Subjective:  Abdominal pain: no  Ambulating:   yes  Tolerating liquids:  yes  Tolerating food:  No  Flatus:   no  BM:    no  Bleeding:   with a small amount of bleeding  Voiding:   No - catheter recently removed  Dizziness:   no  Feeding:   breast    Filed Vitals:    17 0000 17 0200 17 0300 17 0400   BP: 90/51   91/50   Pulse: 78 70 69 72   Temp: 36.8 °C (98.2 °F)   36.6 °C (97.9 °F)   TempSrc:       Resp: 20 17 18 17   Height:       Weight:       SpO2: 95% 93% 96% 96%       Exam:  Breast: Lactating yes  Abdomen: Abdomen soft, appropriately tender. BS normal. No masses,  No organomegaly  Fundal Tenderness:  yes  Fundus Firm: yes  Incision: dry and intact  Below umbilicus: yes  Perineum: perineum intact  Lochia: mild  Extremities: no edema extremities, peripheral pulses and reflexes normal, no edema, redness or tenderness in the calves or thighs    Meds:  Current Facility-Administered Medications   Medication Dose   • LR infusion     • PRN oxytocin (PITOCIN) (20 Units/1000 mL) PRN for excessive uterine bleeding - See Admin Instr  125-999 mL/hr   • misoprostol (CYTOTEC) tablet 600 mcg  600 mcg   • methylergonovine (METHERGINE) injection 0.2 mg  0.2 mg   • carboPROST (HEMABATE) injection 250 mcg  250 mcg   • docusate sodium (COLACE) capsule 100 mg  100 mg   • bisacodyl (DULCOLAX) suppository 10 mg  10 mg   • magnesium hydroxide (MILK OF MAGNESIA) suspension 30 mL  30 mL   • simethicone (MYLICON) chewable tab 80 mg  80 mg   • prenatal plus vitamin (STUARTNATAL 1+1) 27-1 MG tablet 1 Tab  1 Tab   • ondansetron (ZOFRAN) syringe/vial injection 4 mg  4 mg    Or   •  ondansetron (ZOFRAN ODT) dispertab 4 mg  4 mg   • diphenhydrAMINE (BENADRYL) tablet/capsule 25 mg  25 mg    Or   • diphenhydrAMINE (BENADRYL) injection 25 mg  25 mg   • naloxone (NARCAN) injection 0.1 mg  0.1 mg   • ketorolac (TORADOL) injection 30 mg  30 mg   • oxycodone-acetaminophen (PERCOCET) 5-325 MG per tablet 1 Tab  1 Tab   • oxycodone-acetaminophen (PERCOCET-10)  MG per tablet 1 Tab  1 Tab   • ephedrine injection 10 mg  10 mg   • ondansetron (ZOFRAN) syringe/vial injection 4 mg  4 mg   • diphenhydrAMINE (BENADRYL) injection 25 mg  25 mg   • oxytocin (PITOCIN) infusion (for postpartum)   mL/hr       Labs:   Recent Labs      17   1005  17   0409   WBC  13.2*  16.0*   RBC  4.28  3.58*   HEMOGLOBIN  12.2  10.2*   HEMATOCRIT  37.2  30.9*   MCV  86.9  86.3   MCH  28.5  28.5   MCHC  32.8*  33.0*   RDW  41.2  40.8   PLATELETCT  233  188   MPV  10.8  10.4       Assessment:  Chief Admitting Dx:  Pregnancy  Labor and delivery indication for care or intervention  P c/s for failure to progress  Delivery Type:   for failure to progress  Tubal Ligation:  no    Plan:  Continue routine post partum care.      Keke Huntley M.D.

## 2017-04-07 NOTE — CARE PLAN
Problem: Altered physiologic condition related to postoperative  delivery  Goal: Patient physiologically stable as evidenced by normal lochia, palpable uterine involution and vital signs within normal limits  Outcome: PROGRESSING AS EXPECTED  Fundal massage done with scant bleeding    Problem: Alteration in comfort related to surgical incision and/or after birth pains  Goal: Patient is able to ambulate, care for self and infant with acceptable pain level  Outcome: PROGRESSING AS EXPECTED  Pain controlled

## 2017-04-07 NOTE — PROGRESS NOTES
Received awake on bed bonding with baby, with kruse catheter connected to urine bag draining to slightly concentrated urine output,, Assessment done wound covered with pressure dressing clean and dry, Denied pain at this time, Encourage mobilization, Needs attended.

## 2017-04-07 NOTE — OP REPORT
DATE OF SERVICE:  2017    PREOPERATIVE DIAGNOSES:  Intrauterine pregnancy at 39-1/7 weeks with   polyhydramnios, failed induction.    POSTOPERATIVE DIAGNOSES:  Intrauterine pregnancy at 39-1/7 weeks with   polyhydramnios, failed induction.    PROCEDURE PERFORMED:  Primary low transverse  section via   Pfannenstiel.    SURGEON:  Keke Huntley MD    ASSISTANT:  Rox Hodge MD    ANESTHESIA:  Spinal.    COMPLICATIONS:  None.    ESTIMATED BLOOD LOSS:  500 mL.    URINE OUTPUT   325 mL clear urine at the end of the procedure.    INDICATIONS:  This is a 26-year-old  2, para 0-0-1-0 at 39-1/7 weeks   with estimated gestational age, who was admitted the previous day for   induction of labor.  She received prostaglandin gel, Cytotec and Pitocin with   amniotomy.  Her contractions were adequate by intrauterine pressure, catheter   monitoring for greater than 5 hours with no cervical change, remained at 4 cm   dilated.    FINDINGS:  Viable male infant in cephalic presentation with Apgars of 8 and 9,   clear fluid.  Normal uterus, tubes, and ovaries.    DESCRIPTION OF PROCEDURE:  Patient was taken to the operating room where   spinal anesthesia was found to be adequate.  She was then prepped and draped   in the normal sterile fashion in the dorsal supine position with a leftward   hip tilt.  A Pfannenstiel skin incision was made with the scalpel and carried   through to the level of the underlying layers of fascia, which was nicked in   the midline and extended laterally with the Armstrong scissors.  The superior   aspect of the fascial incision was then grasped with Kocher clamps, elevated   and the underlying rectus muscles dissected off sharply.  Attention was then   turned to the inferior aspect of this incision, which in a similar fashion was   grasped, tented up with Kocher clamps, and rectus muscles dissected off   sharply.  The rectus muscles were  in the midline and the peritoneum   was  identified, tented up and entered sharply with the Metzenbaum scissors.    The peritoneal incision was then extended superiorly and inferiorly with good   visualization of the bladder.  The bladder blade was then inserted and the   vesicouterine peritoneum identified, grasped with pickups and entered sharply   with Metzenbaum scissors.  The incision was extended laterally and the bladder   flap was created digitally.  The bladder blade was then reinserted and the   lower uterine segment incised in a transverse fashion with the scalpel.  The   uterine incision was extended laterally with fingers.  Bladder blade was   removed and the infant's head was delivered atraumatically.  The mouth and   nose were bulb suctioned on the field.  Cord was clamped and cut.  The infant   was handed to the awaiting attendant.  The placenta was then removed manually.    The uterus was exteriorized and cleared of all clot and debris.  The uterine   incision was repaired with 1-0 chromic in a running locked fashion.  Second   layer of the same suture was used to obtain excellent hemostasis.  The uterus   was returned to the abdomen.  The gutters were cleared of all clot and debris.    Peritoneum was closed with 3-0 Vicryl.  The fascia was reapproximated with 0   Vicryl in a running fashion.  The subcuticular fat was irrigated.  Scarpas   fascia was closed with interrupted sutures of 2-0 Vicryl and skin was closed   with staples.  The patient tolerated the procedure well.  Sponge, lap and   needle counts were correct x3.  The patient was taken to the recovery room in   stable condition.       ____________________________________     Keke Huntley MD AFC / NIKI    DD:  04/06/2017 15:19:12  DT:  04/06/2017 18:36:56    D#:  [id number]  Job#:  [id number]

## 2017-04-07 NOTE — PROGRESS NOTES
Assessment done. Pt states pain at 2. Incision covered with clean dry dressing without drainage.Lochia scant to light. Pt ambulating in room without dizziness or assistance.Pt. Passing flatus, no problems urinating. Fob in room ,supportive.patient has received education on skylight usage.patient watching education videos.plan of care for today discussed.

## 2017-04-08 PROCEDURE — 700112 HCHG RX REV CODE 229: Performed by: FAMILY MEDICINE

## 2017-04-08 PROCEDURE — A9270 NON-COVERED ITEM OR SERVICE: HCPCS | Performed by: FAMILY MEDICINE

## 2017-04-08 PROCEDURE — 770002 HCHG ROOM/CARE - OB PRIVATE (112)

## 2017-04-08 PROCEDURE — 700102 HCHG RX REV CODE 250 W/ 637 OVERRIDE(OP): Performed by: FAMILY MEDICINE

## 2017-04-08 RX ADMIN — DOCUSATE SODIUM 100 MG: 100 CAPSULE ORAL at 08:53

## 2017-04-08 RX ADMIN — IBUPROFEN 600 MG: 600 TABLET, FILM COATED ORAL at 16:08

## 2017-04-08 RX ADMIN — Medication 1 TABLET: at 08:53

## 2017-04-08 RX ADMIN — IBUPROFEN 600 MG: 600 TABLET, FILM COATED ORAL at 00:05

## 2017-04-08 RX ADMIN — IBUPROFEN 600 MG: 600 TABLET, FILM COATED ORAL at 08:53

## 2017-04-08 ASSESSMENT — PAIN SCALES - GENERAL
PAINLEVEL_OUTOF10: 1
PAINLEVEL_OUTOF10: 3
PAINLEVEL_OUTOF10: 1
PAINLEVEL_OUTOF10: 3
PAINLEVEL_OUTOF10: 5
PAINLEVEL_OUTOF10: 1
PAINLEVEL_OUTOF10: 6
PAINLEVEL_OUTOF10: 3

## 2017-04-08 NOTE — CONSULTS
"Demo with mother on breast massage & hand expression. Baby placed STS on mother. LC nurse assist baby to right breast with instruction on position & deep latch. Observed good latch, suck & coordinated swallow. \"Breastfeeding\" pamphlet provided with review. Encouraged mother to call for further assistance if needed.  "

## 2017-04-08 NOTE — PROGRESS NOTES
Assessment completed. Fundus is firm, lochia rubra is light. Incision is well-approximated, staples in place, open to air. Discussed plan of care for this shift. Patient wishes to call for pain medication as needed. Encouraged to call for needs or concerns, call light in reach.

## 2017-04-08 NOTE — PROGRESS NOTES
Name:   Kyara Jones   Date/Time:  2017 6:52 AM  Gestational Age:  39w3d  Admit Date:   2017  Admitting Dx:   Pregnancy  Labor and delivery indication for care or intervention  P c/s for failure to progress    POD# 2 S/P   for failure to progress - failed IOL for polyhydramnios    S:  Abdominal pain yes   Ambulating   yes  Tolerating PO  yes  Flatus    N\A  Bleeding   Yes light  Voiding   yes   Dizziness   no  Breast feeding  yes  Breast tenderness  no    O:  Pulse: (!) 103  Blood Pressure: 107/53 mmHg     Temp  Av.5 °C (97.7 °F)  Min: 36.3 °C (97.4 °F)  Max: 36.7 °C (98 °F)  Heart: regular rate and rhythm without gallops or murmurs  Lungs: clear bases  Abdomen: distended and soft/nontender  / incision clean and dry.  Extremities: non-tender  Catheter: DC'd    Intake/Output Summary (Last 24 hours) at 17 0652  Last data filed at 17 2100   Gross per 24 hour   Intake      0 ml   Output      0 ml   Net      0 ml       A:  POD# 2 S/P   for failure to progress - failed IOL for polyhydramnios  Stable/progressing well    P:  Routine C/S Postpartum care, continue pain management, encourage ambulation, anticipate DC POD#3     Rox Hodge M.D.

## 2017-04-08 NOTE — PROGRESS NOTES
Pt up and about in room with steady gait pt denies dizziness or acute pain-only has intermittent cramps with breastfeeding.

## 2017-04-08 NOTE — CARE PLAN
Problem: Altered physiologic condition related to postoperative  delivery  Goal: Patient physiologically stable as evidenced by normal lochia, palpable uterine involution and vital signs within normal limits  Outcome: PROGRESSING AS EXPECTED  Fundus is firm, lochia is light.     Problem: Pain Management  Goal: Pain level will decrease to patient’s comfort goal  Outcome: PROGRESSING AS EXPECTED  Patient states she has no pain at this time, will call for pain medications as needed.

## 2017-04-09 VITALS
RESPIRATION RATE: 20 BRPM | TEMPERATURE: 97.3 F | HEIGHT: 66 IN | HEART RATE: 65 BPM | SYSTOLIC BLOOD PRESSURE: 107 MMHG | DIASTOLIC BLOOD PRESSURE: 77 MMHG | OXYGEN SATURATION: 96 % | BODY MASS INDEX: 36.32 KG/M2 | WEIGHT: 225.97 LBS

## 2017-04-09 PROCEDURE — 700102 HCHG RX REV CODE 250 W/ 637 OVERRIDE(OP): Performed by: FAMILY MEDICINE

## 2017-04-09 PROCEDURE — A9270 NON-COVERED ITEM OR SERVICE: HCPCS | Performed by: FAMILY MEDICINE

## 2017-04-09 RX ORDER — IBUPROFEN 600 MG/1
600 TABLET ORAL EVERY 6 HOURS PRN
Qty: 30 TAB | Refills: 1 | Status: SHIPPED | OUTPATIENT
Start: 2017-04-09 | End: 2017-08-30

## 2017-04-09 RX ORDER — PSEUDOEPHEDRINE HCL 30 MG
100 TABLET ORAL 2 TIMES DAILY PRN
Qty: 60 CAP | Refills: 1 | Status: SHIPPED | OUTPATIENT
Start: 2017-04-09 | End: 2017-08-30

## 2017-04-09 RX ORDER — HYDROCODONE BITARTRATE AND ACETAMINOPHEN 5; 325 MG/1; MG/1
1 TABLET ORAL EVERY 4 HOURS PRN
Qty: 30 TAB | Refills: 0 | Status: SHIPPED | OUTPATIENT
Start: 2017-04-09 | End: 2017-08-30

## 2017-04-09 RX ADMIN — IBUPROFEN 600 MG: 600 TABLET, FILM COATED ORAL at 03:50

## 2017-04-09 RX ADMIN — HYDROCODONE BITARTRATE AND ACETAMINOPHEN 1 TABLET: 5; 325 TABLET ORAL at 03:50

## 2017-04-09 RX ADMIN — Medication 1 TABLET: at 09:38

## 2017-04-09 ASSESSMENT — PAIN SCALES - GENERAL
PAINLEVEL_OUTOF10: 2
PAINLEVEL_OUTOF10: 3
PAINLEVEL_OUTOF10: 6

## 2017-04-09 NOTE — DISCHARGE SUMMARY
Discharge Summary:      Kyara Jones      Admit Date:   2017  Discharge Date:  2017     Admitting diagnosis:  Pregnancy  Labor and delivery indication for care or intervention  P c/s for failure to progress  Discharge Diagnosis: Status post  for failure to progress.  Pregnancy Complications: polyhydramnios  Tubal Ligation:  no        History:  No past medical history on file.  OB History    Para Term  AB SAB TAB Ectopic Multiple Living   2 0 0 0 1 1 0 0 0 0      # Outcome Date GA Lbr Case/2nd Weight Sex Delivery Anes PTL Lv   2 Current            1 SAB 2016 8w0d             Comments: D&C            Review of patient's allergies indicates no known allergies.  Patient Active Problem List    Diagnosis Date Noted   • Labor and delivery indication for care or intervention 2017   • Amniotic fluid abnormality - RONEL 21.58cm at 22wk 2016   • Encounter for supervision of normal pregnancy in second trimester 2016   • Vitamin D deficiency 2016   • Tobacco use 2016        Hospital Course:   26 y.o. , now para 1, was admitted with the above mentioned diagnosis, underwent Induction of Labor,  for failure to progress. Patient postpartum course was unremarkable, with progressive advancement in diet , ambulation and toleration of oral analgesia. Patient without complaints today and desires discharge.      Filed Vitals:    17 0731 17 0900 17   BP: 95/46 107/53 99/66 108/72   Pulse: 76 103 89 90   Temp: 36.7 °C (98 °F) 36.3 °C (97.4 °F) 36.7 °C (98.1 °F) 36.6 °C (97.8 °F)   TempSrc:       Resp: 18 20 18 19   Height:       Weight:       SpO2: 96% 96% 97% 96%       Current Facility-Administered Medications   Medication Dose   • ibuprofen (MOTRIN) tablet 600 mg  600 mg   • acetaminophen (TYLENOL) tablet 325 mg  325 mg   • hydrocodone-acetaminophen (NORCO) 5-325 MG per tablet 1 Tab  1 Tab   •  hydrocodone-acetaminophen (NORCO) 5-325 MG per tablet 2 Tab  2 Tab   • LR infusion     • PRN oxytocin (PITOCIN) (20 Units/1000 mL) PRN for excessive uterine bleeding - See Admin Instr  125-999 mL/hr   • misoprostol (CYTOTEC) tablet 600 mcg  600 mcg   • methylergonovine (METHERGINE) injection 0.2 mg  0.2 mg   • carboPROST (HEMABATE) injection 250 mcg  250 mcg   • docusate sodium (COLACE) capsule 100 mg  100 mg   • bisacodyl (DULCOLAX) suppository 10 mg  10 mg   • magnesium hydroxide (MILK OF MAGNESIA) suspension 30 mL  30 mL   • simethicone (MYLICON) chewable tab 80 mg  80 mg   • prenatal plus vitamin (STUARTNATAL 1+1) 27-1 MG tablet 1 Tab  1 Tab   • ondansetron (ZOFRAN) syringe/vial injection 4 mg  4 mg    Or   • ondansetron (ZOFRAN ODT) dispertab 4 mg  4 mg   • diphenhydrAMINE (BENADRYL) tablet/capsule 25 mg  25 mg    Or   • diphenhydrAMINE (BENADRYL) injection 25 mg  25 mg   • oxytocin (PITOCIN) infusion (for postpartum)   mL/hr       Exam:  Abdomen: Abdomen soft, non-tender. BS normal. No masses,  No organomegaly  Fundus Non Tender: yes  Incision: dry and intact  Extremity: extremities, peripheral pulses and reflexes normal     Labs:  Recent Labs      04/07/17   0409   WBC  16.0*   RBC  3.58*   HEMOGLOBIN  10.2*   HEMATOCRIT  30.9*   MCV  86.3   MCH  28.5   MCHC  33.0*   RDW  40.8   PLATELETCT  188   MPV  10.4        Activity:   Discharge to home  Pelvic Rest x 6 weeks    Assessment:  normal postpartum course PPD/POD #3  Discharge Assessment: Taking adequate diet and fluids, No heavy bleeding or foul vaginal discharge , Voiding without difficulty     Follow up: .[facility] or [facility] [place] [facility] in 1 week for incision check.   To resume daily PNV and iron supplement if needed with hydration.   Patient to RT [facility] or ER if any of the following occur:  Fever over 100.5  Severe abdominal pain  Red streaks or painful masses in the breasts  Foul smelling discharge or lochia  Heavy vaginal bleeding saturating a pad per  hour  S/s of PP depression     Discharge Meds:   Current Outpatient Prescriptions   Medication Sig Dispense Refill   • hydrocodone-acetaminophen (NORCO) 5-325 MG Tab per tablet Take 1 Tab by mouth every four hours as needed (for Moderate Pain (Pain Scale 4-6) after delivery). 30 Tab 0   • ibuprofen (MOTRIN) 600 MG Tab Take 1 Tab by mouth every 6 hours as needed (For cramping after delivery; do not give if patient is receiving ketorolac (Toradol)). 30 Tab 1   • docusate sodium 100 MG Cap Take 100 mg by mouth 2 times a day as needed for Constipation. 60 Cap 1       Rox Hodge M.D.

## 2017-04-09 NOTE — CARE PLAN
Problem: Altered physiologic condition related to postoperative  delivery  Goal: Patient physiologically stable as evidenced by normal lochia, palpable uterine involution and vital signs within normal limits  Fundus firm @ umbilicus with light lochia discharge.    Problem: Potential for postpartum infection related to surgical incision, compromised uterine condition, urinary tract or respiratory compromise  Goal: Patient will be afebrile and free from signs and symptoms of infection  Intervention: Get patient out of bed and ambulating as ordered by MD/DO/CNM  Surgical incision staples approximated DENISE, no s/s of infection noted.      Problem: Alteration in comfort related to surgical incision and/or after birth pains  Goal: Patient verbalizes acceptable pain level  Pain well controlled with Motrin and Norco

## 2017-04-09 NOTE — DISCHARGE INSTRUCTIONS
POSTPARTUM DISCHARGE INSTRUCTIONS FOR MOM    YOB: 1990   Age: 26 y.o.               Admit Date: 2017     Discharge Date: 2017  Attending Doctor:  Pushpa Eddy M.D.                  Allergies:  Review of patient's allergies indicates no known allergies.    Discharged to home by car. Discharged via wheelchair, hospital escort: Yes.  Special equipment needed: Not Applicable  Belongings with: Personal  Be sure to schedule a follow-up appointment with your primary care doctor or any specialists as instructed.     Discharge Plan:   Diet Plan: Discussed  Activity Level: Discussed  Confirmed Follow up Appointment: Appointment Scheduled  Confirmed Symptoms Management: Discussed  Medication Reconciliation Updated: Yes  Influenza Vaccine Indication: Indicated: 9 to 64 years of age  Influenza Vaccine Given - only chart on this line when given: Influenza Vaccine Given (See MAR)    REASONS TO CALL YOUR OBSTETRICIAN:  1.   Persistent fever or shaking chills (Temperature higher than 100.4)  2.   Heavy bleeding (soaking more than 1 pad per hour); Passing clots  3.   Foul odor from vagina  4.   Mastitis (Breast infection; breast pain, chills, fever, redness)  5.   Urinary pain, burning or frequency  6.   Episiotomy infection  7.   Abdominal incision infection  8.   Severe depression longer than 24 hours    HAND WASHING  · Prior to handling the baby.  · Before breastfeeding or bottle feeding baby.  · After using the bathroom or changing the baby's diaper.    WOUND CARE  Ask your physician for additional care instructions.  In general:    ·  Incision:      · Keep clean and dry.    · Do NOT lift anything heavier than your baby for up to 6 weeks.    · There should not be any opening or pus.      VAGINAL CARE  · Nothing inside vagina for 6 weeks: no sexual intercourse, tampons or douching.  · Bleeding may continue for 2-4 weeks.  Amount may vary.    · Call your physician for heavy bleeding which means soaking  "more than 1 pad per hour    BIRTH CONTROL  · It is possible to become pregnant at any time after delivery and while breastfeeding.  · Plan to discuss a method of birth control with your physician at your follow up visit. visit.    DIET AND ELIMINATION  · Eating more fiber (bran cereal, fruits, and vegetables) and drinking plenty of fluids will help to avoid constipation.  · Urinary frequency after childbirth is normal.    POSTPARTUM BLUES  During the first few days after birth, you may experience a sense of the \"blues\" which may include impatience, irritability or even crying.  These feeling come and go quickly.  However, as many as 1 in 10 women experience emotional symptoms known as postpartum depression.    Postpartum depression:  May start as early as the second or third day after delivery or take several weeks or months to develop.  Symptoms of \"blues\" are present, but are more intense:  Crying spells; loss of appetite; feelings of hopelessness or loss of control; fear of touching the baby; over concern or no concern at all about the baby; little or no concern about your own appearance/caring for yourself; and/or inability to sleep or excessive sleeping.  Contact your physician if you are experiencing any of these symptoms.    Crisis Hotline:  · [place] Crisis Hotline:  0-808-DHQUGIQ  Or 1-470.546.3695  · [place] Crisis Hotline:  1-805.598.9815  Or 435-235-1879    PREVENTING SHAKEN BABY:  If you are angry or stressed, PUT THE BABY IN THE CRIB, step away, take some deep breaths, and wait until you are calm to care for the baby.  DO NOT SHAKE THE BABY.  You are not alone, call a supporter for help.    · Crisis Call Center 24/7 crisis line 123-137-7099 or 1-556.387.2341  · You can also text them, text \"ANSWER\" to 902251    QUIT SMOKING/TOBACCO USE:  I understand the use of any tobacco products increases my chance of suffering from future heart disease and could cause other illnesses which may shorten my life. " Quitting the use of tobacco products is the single most important thing I can do to improve my health. For further information on smoking / tobacco cessation call a Toll Free Quit Line at 1-773.216.7119 (*National [employer] [place]) or 1-239.835.1086 (American Lung Association) or you can access the web based program at www.lungusa.org.    · [place] Tobacco Users Help Line:  (119) 856-1044       Toll Free: 1-100.417.8084  · Quit Tobacco Program [facility] [place], operated by [facility] [phone]    DEPRESSION / SUICIDE RISK:  As you are discharged from this [facility], it is important to learn how to keep safe from harming yourself.    Recognize the warning signs:  · Abrupt changes in personality, positive or negative- including increase in energy   · Giving away possessions  · Change in eating patterns- significant weight changes-  positive or negative  · Change in sleeping patterns- unable to sleep or sleeping all the time   · Unwillingness or inability to communicate  · Depression  · Unusual sadness, discouragement and loneliness  · Talk of wanting to die  · Neglect of personal appearance   · Rebelliousness- reckless behavior  · Withdrawal from people/activities they love  · Confusion- inability to concentrate     If you or a loved one observes any of these behaviors or has concerns about self-harm, here's what you can do:  · Talk about it- your feelings and reasons for harming yourself  · Remove any means that you might use to hurt yourself (examples: pills, rope, extension cords, firearm)  · Get professional help from the community (Mental Health, Substance Abuse, psychological counseling)  · Do not be alone:Call your Safe Contact- someone whom you trust who will be there for you.  · Call your local CRISIS HOTLINE 555-6167 or 537-327-2801  · Call your local Children's Mobile Crisis Response Team [place] (871) 097-7391 or www.[facility]  · Call the toll free National Suicide Prevention Hotlines    · National Suicide Prevention Lifeline 513-755-HVHS (6556)  · National Hope Line Network 800-SUICIDE (876-1015)    DISCHARGE SURVEY:  Thank you for choosing [facility].  We hope we provided you with very good care.  You may be receiving a survey in the mail.  Please fill it out.  Your opinion is valuable to us.    ADDITIONAL EDUCATIONAL MATERIALS GIVEN TO PATIENT:        My signature on this form indicates that:  1.  I have reviewed and understand the above information  2.  My questions regarding this information have been answered to my satisfaction.  3.  I have formulated a plan with my discharge nurse to obtain my prescribed medication for home.

## 2017-04-09 NOTE — PROGRESS NOTES
Pt received on her room awake, with surgical incision staples on her lower abdomen approximated and DENISE. Fundus firm @ umbilicus with light lochia discharge. Breastfeeding well. Pain well controlled with Motrin. Needs attended. Call light within reach. Hourly rounding practiced.

## 2017-04-09 NOTE — CARE PLAN
Problem: Alteration in comfort related to surgical incision and/or after birth pains  Goal: Patient is able to ambulate, care for self and infant with acceptable pain level  Outcome: PROGRESSING AS EXPECTED  Pt demonstrates proper care of infant, ambulates in room and halls, and states good pain relief from meds.

## 2017-04-13 ENCOUNTER — POST PARTUM (OUTPATIENT)
Dept: OBGYN | Facility: CLINIC | Age: 27
End: 2017-04-13
Payer: MEDICAID

## 2017-04-13 VITALS — BODY MASS INDEX: 33.1 KG/M2 | DIASTOLIC BLOOD PRESSURE: 74 MMHG | WEIGHT: 205 LBS | SYSTOLIC BLOOD PRESSURE: 122 MMHG

## 2017-04-13 DIAGNOSIS — O41.92X0: ICD-10-CM

## 2017-04-13 DIAGNOSIS — Z09 POSTOP CHECK: ICD-10-CM

## 2017-04-13 DIAGNOSIS — Z34.82 ENCOUNTER FOR SUPERVISION OF OTHER NORMAL PREGNANCY IN SECOND TRIMESTER: ICD-10-CM

## 2017-04-13 PROCEDURE — 99024 POSTOP FOLLOW-UP VISIT: CPT | Performed by: OBSTETRICS & GYNECOLOGY

## 2017-04-13 NOTE — PROGRESS NOTES
SUBJECTIVE:  Kyara Jones presents to the clinic 1 weeks following C/S     Eating a regular diet without difficulty. Bowel movement are Normal.  The patient is not having any pain. Spotting. Patient Denies Incisional pain, drainage or redness    OBJECTIVE:  /74 mmHg  Wt 92.987 kg (205 lb)  LMP 07/06/2016  Breastfeeding? Unknown  Current Outpatient Prescriptions on File Prior to Visit   Medication Sig Dispense Refill   • hydrocodone-acetaminophen (NORCO) 5-325 MG Tab per tablet Take 1 Tab by mouth every four hours as needed (for Moderate Pain (Pain Scale 4-6) after delivery). 30 Tab 0   • ibuprofen (MOTRIN) 600 MG Tab Take 1 Tab by mouth every 6 hours as needed (For cramping after delivery; do not give if patient is receiving ketorolac (Toradol)). 30 Tab 1   • docusate sodium 100 MG Cap Take 100 mg by mouth 2 times a day as needed for Constipation. 60 Cap 1   • Prenatal MV-Min-Fe Fum-FA-DHA (PRENATAL 1 PO) Take  by mouth.     • Cholecalciferol 1000 UNIT Cap Take 1 Cap by mouth every day. 30 Cap 6     No current facility-administered medications on file prior to visit.       Constitutional:  alert, no distress.  Abdomen:  soft, bowel sounds active, non-tender.  Incision:  healing well, no drainage, no erythema, no hernia, no seroma, no swelling, no dehiscence, incision well approximated.    IMPRESSION: Doing well postoperatively.  Pt is to increase activities as tolerated.    Lab:   Recent Results (from the past 1008 hour(s))   POCT Fetal Nonstress Test    Collection Time: 03/03/17  8:20 AM   Result Value Ref Range    NST Indications Polyhydramnios     NST Baseline 130s     NST Uterine Activity Quiet     NST Acoustic Stimulation None     NST Assessment       Reactive NST Cat I FHTs +accels -decels mod variability    NST Action Necessary      NST Other Data      NST Return Cont 2x/wk NSTs 2wk Centering     NST Read By [facility]    POCT Fetal Nonstress Test    Collection Time: 03/10/17  1:27 PM    Result Value Ref Range    NST Indications poly     NST Baseline 120     NST Uterine Activity none     NST Acoustic Stimulation no     NST Assessment reactive, cat1     NST Action Necessary      NST Other Data      NST Return      NST Read By     POCT Fetal Nonstress Test    Collection Time: 03/13/17  2:08 PM   Result Value Ref Range    NST Indications polyhydramnios     NST Baseline      NST Uterine Activity      NST Acoustic Stimulation      NST Assessment category1     NST Action Necessary      NST Other Data      NST Return      NST Read By [facility]    POCT Fetal Nonstress Test    Collection Time: 03/17/17  8:13 AM   Result Value Ref Range    NST Indications Poly     NST Baseline 130     NST Uterine Activity none     NST Acoustic Stimulation yes     NST Assessment reactive, cat 1     NST Action Necessary      NST Other Data      NST Return      NST Read By     GRP B STREP, BY PCR (BARBA BROTH)    Collection Time: 03/17/17 11:44 AM   Result Value Ref Range    Strep Gp B DNA PCR Negative Negative   POCT Fetal Nonstress Test    Collection Time: 03/20/17  2:45 PM   Result Value Ref Range    NST Indications Polyhydramnios     NST Baseline 130s     NST Uterine Activity none     NST Acoustic Stimulation      NST Assessment Cat 1     NST Action Necessary      NST Other Data      NST Return twice weekly     NST Read By [place]    POCT Fetal Nonstress Test    Collection Time: 03/27/17  9:16 AM   Result Value Ref Range    NST Indications      NST Baseline      NST Uterine Activity      NST Acoustic Stimulation      NST Assessment      NST Action Necessary      NST Other Data      NST Return      NST Read By     POCT Fetal Nonstress Test    Collection Time: 03/31/17  8:23 AM   Result Value Ref Range    NST Indications polyhydramnios     NST Baseline 140     NST Uterine Activity none     NST Acoustic Stimulation none     NST Assessment reactive     NST Action Necessary      NST Other Data      NST Return      NST Read By [facility]     POCT Urinalysis    Collection Time: 03/31/17  9:04 AM   Result Value Ref Range    POC Color  Negative    POC Appearance  Negative    POC Leukocyte Esterase negative Negative    POC Nitrites negative Negative    POC Urobiligen  Negative (0.2) mg/dL    POC Protein trace Negative mg/dL    POC Urine PH 6.0 5.0 - 8.0    POC Blood negative Negative    POC Specific Gravity 1.020 <1.005 - >1.030    POC Ketones negative Negative mg/dL    POC Biliruben  Negative mg/dL    POC Glucose negative Negative mg/dL   POCT Fetal Nonstress Test    Collection Time: 04/03/17  1:04 PM   Result Value Ref Range    NST Indications Polyhydramnios     NST Baseline 120s     NST Uterine Activity irregular     NST Acoustic Stimulation      NST Assessment Cat 1     NST Action Necessary      NST Other Data      NST Return      NST Read By [employer] Blood Bank Specimen (Not Tested)    Collection Time: 04/05/17 10:05 AM   Result Value Ref Range    Holding Tube - Bb DONE    CBC WITH DIFFERENTIAL    Collection Time: 04/05/17 10:05 AM   Result Value Ref Range    WBC 13.2 (H) 4.8 - 10.8 K/uL    RBC 4.28 4.20 - 5.40 M/uL    Hemoglobin 12.2 12.0 - 16.0 g/dL    Hematocrit 37.2 37.0 - 47.0 %    MCV 86.9 81.4 - 97.8 fL    MCH 28.5 27.0 - 33.0 pg    MCHC 32.8 (L) 33.6 - 35.0 g/dL    RDW 41.2 35.9 - 50.0 fL    Platelet Count 233 164 - 446 K/uL    MPV 10.8 9.0 - 12.9 fL    Neutrophils-Polys 78.10 (H) 44.00 - 72.00 %    Lymphocytes 12.90 (L) 22.00 - 41.00 %    Monocytes 5.90 0.00 - 13.40 %    Eosinophils 1.80 0.00 - 6.90 %    Basophils 0.20 0.00 - 1.80 %    Immature Granulocytes 1.10 (H) 0.00 - 0.90 %    Nucleated RBC 0.00 /100 WBC    Neutrophils (Absolute) 10.29 (H) 2.00 - 7.15 K/uL    Lymphs (Absolute) 1.70 1.00 - 4.80 K/uL    Monos (Absolute) 0.78 0.00 - 0.85 K/uL    Eos (Absolute) 0.24 0.00 - 0.51 K/uL    Baso (Absolute) 0.03 0.00 - 0.12 K/uL    Immature Granulocytes (abs) 0.14 (H) 0.00 - 0.11 K/uL    NRBC (Absolute) 0.00 K/uL   CBC without differential     Collection Time: 04/07/17  4:09 AM   Result Value Ref Range    WBC 16.0 (H) 4.8 - 10.8 K/uL    RBC 3.58 (L) 4.20 - 5.40 M/uL    Hemoglobin 10.2 (L) 12.0 - 16.0 g/dL    Hematocrit 30.9 (L) 37.0 - 47.0 %    MCV 86.3 81.4 - 97.8 fL    MCH 28.5 27.0 - 33.0 pg    MCHC 33.0 (L) 33.6 - 35.0 g/dL    RDW 40.8 35.9 - 50.0 fL    Platelet Count 188 164 - 446 K/uL    MPV 10.4 9.0 - 12.9 fL       PLAN:  Continue any current medications.  (See Med List for details.)  Return to clinic  : in 4 week(s).

## 2017-04-13 NOTE — NON-PROVIDER
Pt here for C/S check  Pain is better, taking Percocet PRN, Motrin q 6 hr  Phone: [phone]  Pt denies any problems

## 2017-04-13 NOTE — MR AVS SNAPSHOT
Kyara Lockeuilar   2017 11:15 AM   Post Partum   MRN: [id number]    [facility]   Dept Phone:  [phone]    Description:  Female : 1990   Provider:  Nathaniel Alfonso M.D.           Allergies as of 2017     No Known Allergies      You were diagnosed with     Amniotic fluid abnormality, second trimester, not applicable or unspecified fetus   [3857573]       Encounter for supervision of other normal pregnancy in second trimester   [8083416]       Postop check   [404570]         Vital Signs     Blood Pressure Weight Last Menstrual Period Breastfeeding? Smoking Status       122/74 mmHg 92.987 kg (205 lb) 2016 Unknown Former Smoker       Basic Information     Date Of Birth Sex Race Ethnicity Preferred Language    1990 Female  Non- English      Your appointments     May 24, 2017  2:30 PM   Post Partum with KAMRAN Ghotra   [facility] [address] [address] [phone]              Problem List              ICD-10-CM Priority Class Noted - Resolved    Tobacco use Z72.0   2016 - Present    Vitamin D deficiency E55.9   2016 - Present    Encounter for supervision of normal pregnancy in second trimester Z34.92   2016 - Present    Amniotic fluid abnormality - RONEL 21.58cm at 22wk O41.90X0   2016 - Present    Labor and delivery indication for care or intervention O75.9   2017 - Present      Health Maintenance        Date Due Completion Dates    IMM HEP B VACCINE (1 of 3 - Primary Series) 1990 ---    IMM HEP A VACCINE (1 of 2 - Standard Series) 10/28/1992 ---    IMM HPV VACCINE (1 of 3 - Female 3 Dose Series) 10/28/2002 ---    IMM VARICELLA (CHICKENPOX) VACCINE (1 of 2 - 2 Dose Adolescent Series) 10/28/2004 ---    IMM PNEUMOCOCCAL 19-64 (ADULT) MEDIUM RISK SERIES (1 of 1 - PPSV23) 10/28/2010 ---    PAP SMEAR 2019    IMM DTaP/Tdap/Td Vaccine (2 - Td)  2/3/2027 2/3/2017            Current Immunizations     Influenza Vaccine Quad Inj (Pf) 4/6/2017  6:15 PM    Tdap Vaccine 2/3/2017  9:12 AM      Below and/or attached are the medications your provider expects you to take. Review all of your home medications and newly ordered medications with your provider and/or pharmacist. Follow medication instructions as directed by your provider and/or pharmacist. Please keep your medication list with you and share with your provider. Update the information when medications are discontinued, doses are changed, or new medications (including over-the-counter products) are added; and carry medication information at all times in the event of emergency situations     Allergies:  No Known Allergies          Medications  Valid as of: April 13, 2017 -  2:12 PM    Generic Name Brand Name Tablet Size Instructions for use    Cholecalciferol (Cap) Cholecalciferol 1000 UNIT Take 1 Cap by mouth every day.        Docusate Sodium (Cap)  MG Take 100 mg by mouth 2 times a day as needed for Constipation.        Hydrocodone-Acetaminophen (Tab) NORCO 5-325 MG Take 1 Tab by mouth every four hours as needed (for Moderate Pain (Pain Scale 4-6) after delivery).        Ibuprofen (Tab) MOTRIN 600 MG Take 1 Tab by mouth every 6 hours as needed (For cramping after delivery; do not give if patient is receiving ketorolac (Toradol)).        Prenatal MV-Min-Fe Fum-FA-DHA   Take  by mouth.        .                 Medicines prescribed today were sent to:     [facility] [address] [place] [address] [address]     [address] [facility] [address]    Phone: [phone] Fax: [phone]    Open 24 Hours?: No      Medication refill instructions:       If your prescription bottle indicates you have medication refills left, it is not necessary to call your provider’s office. Please contact your pharmacy and they will refill your medication.    If your prescription bottle indicates you do not have any refills left, you  may request refills at any time through one of the following ways: The online Aut[employer] system (except Urgent Care), by calling your provider’s office, or by asking your pharmacy to contact your provider’s office with a refill request. Medication refills are processed only during regular business hours and may not be available until the next business day. Your provider may request additional information or to have a follow-up visit with you prior to refilling your medication.   *Please Note: Medication refills are assigned a new Rx number when refilled electronically. Your pharmacy may indicate that no refills were authorized even though a new prescription for the same medication is available at the pharmacy. Please request the medicine by name with the pharmacy before contacting your provider for a refill.        Other Notes About Your Plan     BELLA Saucedo           Authernative Access Code: Activation code not generated  Current Authernative Status: Active

## 2017-05-24 ENCOUNTER — POST PARTUM (OUTPATIENT)
Dept: OBGYN | Facility: CLINIC | Age: 27
End: 2017-05-24
Payer: MEDICAID

## 2017-05-24 VITALS
WEIGHT: 196 LBS | HEIGHT: 66 IN | SYSTOLIC BLOOD PRESSURE: 108 MMHG | DIASTOLIC BLOOD PRESSURE: 60 MMHG | BODY MASS INDEX: 31.5 KG/M2

## 2017-05-24 DIAGNOSIS — O41.92X0: ICD-10-CM

## 2017-05-24 DIAGNOSIS — Z34.82 ENCOUNTER FOR SUPERVISION OF OTHER NORMAL PREGNANCY IN SECOND TRIMESTER: ICD-10-CM

## 2017-05-24 PROCEDURE — 90050 PR POSTPARTUM VISIT: CPT | Performed by: NURSE PRACTITIONER

## 2017-05-24 RX ORDER — ACETAMINOPHEN AND CODEINE PHOSPHATE 120; 12 MG/5ML; MG/5ML
1 SOLUTION ORAL DAILY
Qty: 28 TAB | Refills: 11 | Status: SHIPPED | OUTPATIENT
Start: 2017-05-24 | End: 2017-08-30

## 2017-05-24 NOTE — MR AVS SNAPSHOT
"        Kyara Olmstead Jones   2017 2:30 PM   Post Partum   MRN: [id number]    [facility]   Dept Phone:  [phone]    Description:  Female : 1990   Provider:  KAMRAN Ghotra           Allergies as of 2017     No Known Allergies      You were diagnosed with     Amniotic fluid abnormality, second trimester, not applicable or unspecified fetus   [[id number]]       Encounter for supervision of other normal pregnancy in second trimester   [[id number]]       Postpartum examination following  delivery   [[id number]]         Vital Signs     Blood Pressure Height Weight Body Mass Index Last Menstrual Period Smoking Status    108/60 mmHg 1.676 m (5' 5.98\") 88.905 kg (196 lb) 31.65 kg/m2 2016 Former Smoker      Basic Information     Date Of Birth Sex Race Ethnicity Preferred Language    1990 Female  Non- English      Problem List              ICD-10-CM Priority Class Noted - Resolved    Tobacco use Z72.0   2016 - Present    Vitamin D deficiency E55.9   2016 - Present      Health Maintenance        Date Due Completion Dates    IMM HEP B VACCINE (1 of 3 - Primary Series) 1990 ---    IMM HEP A VACCINE (1 of 2 - Standard Series) 10/28/1992 ---    IMM HPV VACCINE (1 of 3 - Female 3 Dose Series) 10/28/2002 ---    IMM VARICELLA (CHICKENPOX) VACCINE (1 of 2 - 2 Dose Adolescent Series) 10/28/2004 ---    IMM PNEUMOCOCCAL 19-64 (ADULT) MEDIUM RISK SERIES (1 of 1 - PPSV23) 10/28/2010 ---    PAP SMEAR 2019    IMM DTaP/Tdap/Td Vaccine (2 - Td) 2/3/2027 2/3/2017            Current Immunizations     Influenza Vaccine Quad Inj (Pf) 2017  6:15 PM    Tdap Vaccine 2/3/2017  9:12 AM      Below and/or attached are the medications your provider expects you to take. Review all of your home medications and newly ordered medications with your provider and/or pharmacist. Follow medication instructions as directed by your provider and/or " pharmacist. Please keep your medication list with you and share with your provider. Update the information when medications are discontinued, doses are changed, or new medications (including over-the-counter products) are added; and carry medication information at all times in the event of emergency situations     Allergies:  No Known Allergies          Medications  Valid as of: May 24, 2017 -  3:30 PM    Generic Name Brand Name Tablet Size Instructions for use    Cholecalciferol (Cap) Cholecalciferol 1000 UNIT Take 1 Cap by mouth every day.        Docusate Sodium (Cap)  MG Take 100 mg by mouth 2 times a day as needed for Constipation.        Hydrocodone-Acetaminophen (Tab) NORCO 5-325 MG Take 1 Tab by mouth every four hours as needed (for Moderate Pain (Pain Scale 4-6) after delivery).        Ibuprofen (Tab) MOTRIN 600 MG Take 1 Tab by mouth every 6 hours as needed (For cramping after delivery; do not give if patient is receiving ketorolac (Toradol)).        Norethindrone (Tab) MICRONOR 0.35 MG Take 1 Tab by mouth every day.        Prenatal MV-Min-Fe Fum-FA-DHA   Take  by mouth.        .                 Medicines prescribed today were sent to:     [facility] [address] [place] [address] - [address] [facility]    [address] [place] [address]    Phone: [phone] Fax: [phone]    Open 24 Hours?: No      Medication refill instructions:       If your prescription bottle indicates you have medication refills left, it is not necessary to call your provider’s office. Please contact your pharmacy and they will refill your medication.    If your prescription bottle indicates you do not have any refills left, you may request refills at any time through one of the following ways: The online [employer]O365 system (except Urgent Care), by calling your provider’s office, or by asking your pharmacy to contact your provider’s office with a refill request. Medication refills are processed only during regular business hours and may not be  available until the next business day. Your provider may request additional information or to have a follow-up visit with you prior to refilling your medication.   *Please Note: Medication refills are assigned a new Rx number when refilled electronically. Your pharmacy may indicate that no refills were authorized even though a new prescription for the same medication is available at the pharmacy. Please request the medicine by name with the pharmacy before contacting your provider for a refill.        Other Notes About Your Plan     BELLA Schaeffer Access Code: Activation code not generated  Current Guide Financial Status: Active

## 2017-05-24 NOTE — NON-PROVIDER
Pt here today for postpartum exam.  Delivery type: primary C Section on 4/6/17  Currently : breast feeding  Desired BCM: depo provera or IUD   LMP: n/a  Last pap: 6/1/2016=negative  Phone # [phone]

## 2017-05-24 NOTE — PROGRESS NOTES
"Subjective:      Kyara Jones is a 26 y.o. female who presents with No chief complaint on file.  Here for postpartum exam.  Is totally breastfeeding.  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  Her mother will be caring for her baby.  Plans on continuing breastfeeding after returning to work.    Reviewed different types of birth control.  Wants OCPs, RX plus instructions reviewed.  Diet/exercise reviewed.            HPI    ROS       Objective:     /60 mmHg  Ht 1.676 m (5' 5.98\")  Wt 88.905 kg (196 lb)  BMI 31.65 kg/m2  LMP 07/06/2016     Physical Exam     normal postpartum exa,m  Thyroid WNL  Lungs clear to auscultation bilaterally  HR RRR  Incision healing well  Abdomen soft, no masses, non tender  Perineum intact  Cervix closed, no cervical motion tenderness     Assessment/Plan:     1. Amniotic fluid abnormality, second trimester, not applicable or unspecified fetus      2. Encounter for supervision of other normal pregnancy in second trimester          "

## 2017-08-30 ENCOUNTER — GYNECOLOGY VISIT (OUTPATIENT)
Dept: OBGYN | Facility: CLINIC | Age: 27
End: 2017-08-30
Payer: MEDICAID

## 2017-08-30 VITALS — SYSTOLIC BLOOD PRESSURE: 100 MMHG | BODY MASS INDEX: 29.88 KG/M2 | DIASTOLIC BLOOD PRESSURE: 60 MMHG | WEIGHT: 185 LBS

## 2017-08-30 DIAGNOSIS — Z30.013 ENCOUNTER FOR INITIAL PRESCRIPTION OF INJECTABLE CONTRACEPTIVE: ICD-10-CM

## 2017-08-30 PROCEDURE — 99214 OFFICE O/P EST MOD 30 MIN: CPT | Performed by: NURSE PRACTITIONER

## 2017-08-30 RX ORDER — MEDROXYPROGESTERONE ACETATE 150 MG/ML
150 INJECTION, SUSPENSION INTRAMUSCULAR ONCE
Qty: 1 VIAL | Refills: 4 | Status: SHIPPED | OUTPATIENT
Start: 2017-08-30 | End: 2017-08-30

## 2017-08-30 ASSESSMENT — ENCOUNTER SYMPTOMS
FATIGUE: 0
NAUSEA: 0
FEVER: 0
ANOREXIA: 0
CHANGE IN BOWEL HABIT: 0
DIAPHORESIS: 0
MYALGIAS: 0
VERTIGO: 0
NUMBNESS: 0
COUGH: 0
NECK PAIN: 0
SORE THROAT: 0
HEADACHES: 0
ARTHRALGIAS: 0
CHILLS: 0

## 2017-08-30 NOTE — PATIENT INSTRUCTIONS
Contraceptive Injection Information  Contraceptive injections protect against pregnancy. Progesterone-only injections are given every 3 months to prevent pregnancy. These injections contain synthetic progesterone hormone. This synthetic progesterone hormone stops the ovaries from releasing eggs. It also thickens cervical mucus and changes the uterine lining.  Your health care provider will make sure you are a good candidate for contraceptive injections. Discuss the possible side effects of the injection with your health care provider.  ADVANTAGES  · They are highly effective and reversible.  · They slow down the flow of heavy menstrual periods.  · They control cramps and painful menstrual periods.  · Some women no longer get their period.  · They are effective in preventing pregnancy when used correctly.  · You are always protected from getting pregnant when you get the injection on time.  DISADVANTAGES  · They can be associated with weight gain and irregular bleeding.  · They do not protect against sexually transmitted diseases (STDs).  · You must visit your health care provider every 3 months.  · The injections may be uncomfortable.  · They may cost more than other methods of birth control.  · It can take between 6 months and 2 years to be able to get pregnant (fertility).  · They may also cause bone loss.     This information is not intended to replace advice given to you by your health care provider. Make sure you discuss any questions you have with your health care provider.     Document Released: 12/06/2012 Document Revised: 08/20/2014 Document Reviewed: 06/17/2014  [employer] Interactive Patient Education ©2016 [employer]

## 2017-08-30 NOTE — PROGRESS NOTES
Subjective:      Kyara Jones is a 26 y.o. female who presents with Contraception (Pt would like to discuss a new form of birth control )    Kyara presents today for contraception change she stopped hr POP's last week and desires to get a depo shot as she forgets her pills often, she is breastfeeding.  Her last menstrual period was 8/26/17 to current, pt states periods are regular at this time. Pt has menstrual crampingno.  Pt has a history of Primary C/S 4/2017         Contraception   This is a new (pt cannot remember to take her birth control pills desires a new form and would like to trythe injection) problem. Pertinent negatives include no anorexia, arthralgias, change in bowel habit, chest pain, chills, congestion, coughing, diaphoresis, fatigue, fever, headaches, myalgias, nausea, neck pain, numbness, rash, sore throat or vertigo. Improvement on treatment: trial of new method.       Review of Systems   Constitutional: Negative for chills, diaphoresis, fatigue and fever.   HENT: Negative for congestion and sore throat.    Respiratory: Negative for cough.    Cardiovascular: Negative for chest pain.   Gastrointestinal: Negative for anorexia, change in bowel habit and nausea.   Musculoskeletal: Negative for arthralgias, myalgias and neck pain.   Skin: Negative for rash.   Neurological: Negative for vertigo, numbness and headaches.          Objective:     /60   Wt 83.9 kg (185 lb)   BMI 29.88 kg/m²      Physical Exam  Not performed not due          Assessment/Plan:     1. Encounter for initial prescription of injectable contraceptive  Return tomorrow for MA visit for injection of Depo   - medroxyPROGESTERone (DEPO-PROVERA) 150 MG/ML Suspension; 1 mL by Intramuscular route Once for 1 dose.  Dispense: 1 Vial; Refill: 4  Strong discussion of Depo Provera risks and benefits:   Risks irregular bleeding, hair loss, bone loss, mood changes, acne, and weight gain.  Benefits: Decreased bleeding and  cramping, control of periods and eventually no bleeding after being on method for 6-9 months. Great contraception at 99% effective.  Instructions:  Injection every 11-13 weeks, do not rub the injection site, Take 1,200 mg Calcium daily between supplement and foods, weight bearing exercise 30-45 minutes ever day to prevent bone loss and weight gain.  2. Annual exam due 11/2017 along with Next Depo due 11/16-11/30/17

## 2017-08-30 NOTE — NON-PROVIDER
Pt here to discuss a new form of birth control she was on birth control pills but would forget to take them, pt is interested in Depo Injection.   Currently breast and bottle feeding.   # [phone]  Pt states stopped birth control pills this month and has had spotting.   Most recent pap smear in the system is from 6/1/17

## 2017-08-31 ENCOUNTER — NON-PROVIDER VISIT (OUTPATIENT)
Dept: OBGYN | Facility: CLINIC | Age: 27
End: 2017-08-31
Payer: MEDICAID

## 2017-08-31 DIAGNOSIS — Z30.013 ENCOUNTER FOR INITIAL PRESCRIPTION OF INJECTABLE CONTRACEPTIVE: ICD-10-CM

## 2017-08-31 DIAGNOSIS — Z32.02 NEGATIVE PREGNANCY TEST: ICD-10-CM

## 2017-08-31 LAB
INT CON NEG: NEGATIVE
INT CON POS: POSITIVE
POC URINE PREGNANCY TEST: NEGATIVE

## 2017-08-31 PROCEDURE — 96372 THER/PROPH/DIAG INJ SC/IM: CPT | Performed by: OBSTETRICS & GYNECOLOGY

## 2017-08-31 PROCEDURE — 81025 URINE PREGNANCY TEST: CPT | Performed by: OBSTETRICS & GYNECOLOGY

## 2017-11-30 ENCOUNTER — NON-PROVIDER VISIT (OUTPATIENT)
Dept: OBGYN | Facility: CLINIC | Age: 27
End: 2017-11-30
Payer: MEDICAID

## 2017-11-30 DIAGNOSIS — Z30.013 ENCOUNTER FOR INITIAL PRESCRIPTION OF INJECTABLE CONTRACEPTIVE: ICD-10-CM

## 2017-11-30 PROCEDURE — 96372 THER/PROPH/DIAG INJ SC/IM: CPT | Performed by: OBSTETRICS & GYNECOLOGY

## 2017-11-30 NOTE — NON-PROVIDER
Pt here for depo inj. Neg pregnancy test. Consent signed 17.   NDC: [address]-0  LOT#: [id number]  Expiration Date: 2021  Dose: 150mg  Site: Left Upper Outer Quadrant Gluteal  Patient educated on use and side effects of medication. Name and  verified prior to injection. Pt tolerated? yes   Verified by piotr  Administered by Crystal Brush at 10:43 AM.  Patient Provided Medication: yes

## 2018-03-01 ENCOUNTER — NON-PROVIDER VISIT (OUTPATIENT)
Dept: OBGYN | Facility: CLINIC | Age: 28
End: 2018-03-01
Payer: MEDICAID

## 2018-03-01 DIAGNOSIS — Z30.013 ENCOUNTER FOR INITIAL PRESCRIPTION OF INJECTABLE CONTRACEPTIVE: ICD-10-CM

## 2018-03-01 PROCEDURE — 96372 THER/PROPH/DIAG INJ SC/IM: CPT | Performed by: OBSTETRICS & GYNECOLOGY

## 2018-03-01 NOTE — NON-PROVIDER
Depo-Provera Injection given today  NDC: [address]-2  LOT#: [id number]  Expiration Date: 2022  Dose: 1mL  Site: Right Upper Outer Quadrant Gluteal  Patient educated on use and side effects of medication. Name and  verified prior to injection. Pt tolerated? yes  Verified by CLM  Administered by Aida Emanuel at 1:35 PM.  Patient Provided Medication: yes

## 2020-03-19 ENCOUNTER — INITIAL PRENATAL (OUTPATIENT)
Dept: OBGYN | Facility: CLINIC | Age: 30
End: 2020-03-19
Payer: COMMERCIAL

## 2020-03-19 VITALS
SYSTOLIC BLOOD PRESSURE: 100 MMHG | WEIGHT: 187.6 LBS | BODY MASS INDEX: 30.15 KG/M2 | DIASTOLIC BLOOD PRESSURE: 64 MMHG | HEIGHT: 66 IN

## 2020-03-19 DIAGNOSIS — N93.8 DUB (DYSFUNCTIONAL UTERINE BLEEDING): ICD-10-CM

## 2020-03-19 LAB
INT CON NEG: NEGATIVE
INT CON POS: POSITIVE
POC URINE PREGNANCY TEST: NORMAL

## 2020-03-19 PROCEDURE — 99213 OFFICE O/P EST LOW 20 MIN: CPT | Mod: 25 | Performed by: OBSTETRICS & GYNECOLOGY

## 2020-03-19 PROCEDURE — 76830 TRANSVAGINAL US NON-OB: CPT | Performed by: OBSTETRICS & GYNECOLOGY

## 2020-03-19 PROCEDURE — 81025 URINE PREGNANCY TEST: CPT | Performed by: OBSTETRICS & GYNECOLOGY

## 2020-03-19 NOTE — PROGRESS NOTES
"Kyara Jones,  29 y.o.  female presents today with a C/O of :oligomenorrhea. Pt   Patient's last menstrual period was 2020 (approximate).       Subjective : Nausea/Vomiting: Sometimes:  Abdominal /pelvic cramping : No :   vaginal bleeding:No      Menstrual Flow : moderate   GYN ROS:  normal menses, no abnormal bleeding, pelvic pain or discharge, no breast pain or new or enlarging lumps on self exam      History reviewed. No pertinent past medical history.    Past Surgical History:   Procedure Laterality Date   • PRIMARY C SECTION  2017    Procedure: PRIMARY C SECTION;  Surgeon: Keke Huntley M.D.;  Location: LABOR AND DELIVERY;  Service:    • DILATION AND EVACUATION N/A 2016    Procedure: DILATION AND EVACUATION;  Surgeon: Joe Ramirez M.D.;  Location: SURGERY SAME DAY [facility];  Service:        Current Birth control:  none    OB History    Para Term  AB Living   3 1 1 0 1 0   SAB TAB Ectopic Molar Multiple Live Births   1 0 0   0        # Outcome Date GA Lbr Case/2nd Weight Sex Delivery Anes PTL Lv   3 Current            2 Term 17 39w1d  3.065 kg (6 lb 12.1 oz) M CS-LTranv Spinal        Birth Comments: Primary C/S due to FTP   1 SAB 2016 8w0d             Birth Comments: D&C            Allergy:      Patient has no known allergies.    Exam;    /64   Ht 1.676 m (5' 6\")   Wt 85.1 kg (187 lb 9.6 oz)   LMP 2020 (Approximate)   BMI 30.28 kg/m²   well-appearing, well-hydrated, well-nourished  normal;   PERRLA, EOMI, fundi grossly normal, no papilledema, no AV nicking, sclera clear  Clear  RRR No M  abdomen is soft without significant tenderness, masses, organomegaly or guarding  External genitalia normal, Vagina normal without dischargeLab.    Recent Results (from the past 336 hour(s))   POCT Pregnancy    Collection Time: 20  9:12 AM   Result Value Ref Range    POC Urine Pregnancy Test Postive Negative    Internal Control Positive " Positive     Internal Control Negative Negative      Ultrasound :  Per my Read   Transvaginal      First trimester findings: Intrauterine gestational sac seen: yes  Gestational sac summary: fetal pole seen, yolk sac seen, EGA: 8 weeks + 6 days  Fetal cardiac activity: present  Crown-rump length: 2.17 cm  MICHAEL 10/23/2020  Assessment:    dysfunctional uterine bleeding    Plan:  2 weeks for NOB

## 2020-03-19 NOTE — PROGRESS NOTES
Pt here for  visit   # [phone]  Pt states no complaints.   LMP 1/9/2020  GA 10w  MICHAEL 10/15/2020  Pregnancy test in clinic today

## 2020-04-17 ENCOUNTER — INITIAL PRENATAL (OUTPATIENT)
Dept: OBGYN | Facility: CLINIC | Age: 30
End: 2020-04-17
Payer: COMMERCIAL

## 2020-04-17 ENCOUNTER — HOSPITAL ENCOUNTER (OUTPATIENT)
Facility: MEDICAL CENTER | Age: 30
End: 2020-04-17
Attending: NURSE PRACTITIONER
Payer: COMMERCIAL

## 2020-04-17 VITALS — SYSTOLIC BLOOD PRESSURE: 108 MMHG | BODY MASS INDEX: 30.51 KG/M2 | WEIGHT: 189 LBS | DIASTOLIC BLOOD PRESSURE: 62 MMHG

## 2020-04-17 DIAGNOSIS — Z34.92 PRENATAL CARE IN SECOND TRIMESTER: ICD-10-CM

## 2020-04-17 DIAGNOSIS — O09.92 ENCOUNTER FOR SUPERVISION OF HIGH RISK PREGNANCY IN SECOND TRIMESTER, ANTEPARTUM: Primary | ICD-10-CM

## 2020-04-17 DIAGNOSIS — O34.219 HISTORY OF CESAREAN DELIVERY, CURRENTLY PREGNANT: ICD-10-CM

## 2020-04-17 DIAGNOSIS — O09.92 ENCOUNTER FOR SUPERVISION OF HIGH RISK PREGNANCY IN SECOND TRIMESTER, ANTEPARTUM: ICD-10-CM

## 2020-04-17 PROBLEM — Z30.013 ENCOUNTER FOR INITIAL PRESCRIPTION OF INJECTABLE CONTRACEPTIVE: Status: RESOLVED | Noted: 2017-08-30 | Resolved: 2020-04-17

## 2020-04-17 LAB
APPEARANCE UR: CLEAR
BILIRUB UR STRIP-MCNC: NORMAL MG/DL
COLOR UR AUTO: YELLOW
GLUCOSE UR STRIP.AUTO-MCNC: NEGATIVE MG/DL
KETONES UR STRIP.AUTO-MCNC: NEGATIVE MG/DL
LEUKOCYTE ESTERASE UR QL STRIP.AUTO: NEGATIVE
NITRITE UR QL STRIP.AUTO: NEGATIVE
PH UR STRIP.AUTO: 7.5 [PH] (ref 5–8)
PROT UR QL STRIP: NEGATIVE MG/DL
RBC UR QL AUTO: NEGATIVE
SP GR UR STRIP.AUTO: 1.01
UROBILINOGEN UR STRIP-MCNC: NORMAL MG/DL

## 2020-04-17 PROCEDURE — 88175 CYTOPATH C/V AUTO FLUID REDO: CPT

## 2020-04-17 PROCEDURE — 87591 N.GONORRHOEAE DNA AMP PROB: CPT

## 2020-04-17 PROCEDURE — 87491 CHLMYD TRACH DNA AMP PROBE: CPT

## 2020-04-17 PROCEDURE — 81002 URINALYSIS NONAUTO W/O SCOPE: CPT | Performed by: NURSE PRACTITIONER

## 2020-04-17 PROCEDURE — 0500F INITIAL PRENATAL CARE VISIT: CPT | Performed by: NURSE PRACTITIONER

## 2020-04-17 ASSESSMENT — ENCOUNTER SYMPTOMS
RESPIRATORY NEGATIVE: 1
CONSTITUTIONAL NEGATIVE: 1
PSYCHIATRIC NEGATIVE: 1
GASTROINTESTINAL NEGATIVE: 1
CARDIOVASCULAR NEGATIVE: 1
MUSCULOSKELETAL NEGATIVE: 1
EYES NEGATIVE: 1
NEUROLOGICAL NEGATIVE: 1

## 2020-04-17 NOTE — PROGRESS NOTES
NOB today. /US 3/19/2020  Last pap: 106=negative  Phone # [phone]  Pharmacy confirmed  No problems today  AFP discussed  Patient would like to try for

## 2020-04-17 NOTE — PROGRESS NOTES
Subjective:      S:  Kyara Jones is a 29 y.o.  female  @ EGA: 13w0d MICHAEL: Estimated Date of Delivery: 10/23/20  per  who presents for her new OB exam.      Her OB hx includes SAB in 2016 w D&C, C/S in 2017 for FTP. She desires TOLAC.   History of HSV I or II in self or partner: no  History of STIs in self or partner: no  History of Thyroid problems: no    No ER visits or previous care in this pregnancy. She has no complaints.  Desires AFP when appropriate.  Declines CF.  Reports no FM, VB, LOF, or cramping.  Denies dysuria, vaginal DC.  Pt is  and lives with FOB and child. FOB is happy and involved. She is currently on work furlough due to COVID-19, no heavy lifting, no chemical exposure.  Pregnancy is unplanned but desired.    O:    Vitals:    20 0913   BP: 108/62   Weight: 85.7 kg (189 lb)    See H&P Prenatal Physical.  Wet mount: not indicated        FHTs: 155        Fundal ht: 13cm     A:   1.  IUP @ 13w0d MICHAEL: Estimated Date of Delivery: 10/23/20 per          2.  S=D        3.    Patient Active Problem List    Diagnosis Date Noted   • Encounter for supervision of high risk pregnancy in second trimester, antepartum 2020   • History of  delivery x1, desires TOLAC 2020   • Vitamin D deficiency 2016         P:  1.  GC/CT done. Pap done.         2.  Prenatal labs and UDS ordered - lab slip given        3.  Discussed diet and exercise during pregnancy. Encouraged good nutrition, and daily exercise including walking or swimming. Discussed expected weight gain during pregnancy.              4.  Discussed adequate hydration during pregnancy.        5.  NOB packet given        6.  Return to office in 4 wks with physician to discuss TOLAC        7.  Complete OB US in 6-7 wks        8.  Pregnancy guide provided        9.  Childbirth education discussed. Not a candidate for Centering Pregnancy    HPI    Review of Systems   Constitutional: Negative.     HENT: Negative.    Eyes: Negative.    Respiratory: Negative.    Cardiovascular: Negative.    Gastrointestinal: Negative.    Genitourinary: Negative.    Musculoskeletal: Negative.    Skin: Negative.    Neurological: Negative.    Endo/Heme/Allergies: Negative.    Psychiatric/Behavioral: Negative.           Objective:     /62   Wt 85.7 kg (189 lb)   LMP 2020 (Approximate)   BMI 30.51 kg/m²      Physical Exam  Vitals signs and nursing note reviewed.   Constitutional:       Appearance: She is well-developed.   Neck:      Musculoskeletal: Normal range of motion and neck supple.   Cardiovascular:      Rate and Rhythm: Normal rate and regular rhythm.      Heart sounds: Normal heart sounds.   Pulmonary:      Effort: Pulmonary effort is normal.      Breath sounds: Normal breath sounds.   Abdominal:      Palpations: Abdomen is soft.   Genitourinary:     Vagina: Normal.      Comments: Uterus enlarged, c/w 13 wk ga  Musculoskeletal: Normal range of motion.   Skin:     General: Skin is warm and dry.   Neurological:      Mental Status: She is alert and oriented to person, place, and time.      Deep Tendon Reflexes: Reflexes are normal and symmetric.   Psychiatric:         Behavior: Behavior normal.         Thought Content: Thought content normal.         Judgment: Judgment normal.                 Assessment/Plan:       1. Encounter for supervision of high risk pregnancy in second trimester, antepartum    - HEP C VIRUS ANTIBODY; Future  - PREG CNTR PRENATAL PN; Future  - THINPREP RFLX HPV ASCUS W/CTNG; Future  - URINE DRUG SCREEN W/CONF (AR); Future  - US-OB 2ND 3RD TRI COMPLETE; Future    2. Prenatal care in second trimester    - POCT Urinalysis    3. History of  delivery x1, desires TOLAC

## 2020-04-17 NOTE — LETTER
Cystic Fibrosis Carrier Testing  Kyara Jones    The following information is about a blood test that can be done to determine if you and/or your partner carry the gene for cystic fibrosis.    WHAT IS CYSTIC FIBROSIS?  · Cystic fibrosis (CF) is an inherited disease that affects more than 25,000 [place] children and young adults.  · Symptoms of CF vary but include lung congestion, pneumonia, diarrhea and poor growth.  Most people with CF have severe medical problems and some die at a young age.  Others have so few symptoms they are unaware they have CF.  · CF does not affect intelligence.  · Although there is no cure for CF at this time, scientists are making progress in improving treatment and in searching for a cure.  In the past many people with CF  at a very young age.  Today, many are living into their 20’s and 30’s.    IS THERE A CHANCE MY BABY COULD HAVE CYSTIC FIBROSIS?  · You can have a child with CF even if there is no history in your family (see chart below).  · CF testing can help determine if you are a carrier and at risk to have a child with CF.  Note: if both parents are carriers, there is a 1 in 4 (25%) chance with each pregnancy that they will have a child with CF.  · Carriers have one normal CF gene and one altered CF gene.  · People with CF have two altered CF genes.  · Most people have two normal copies of the CF gene.    Approximate risk that a couple with no family history of cystic fibrosis will have a child with cystic fibrosis:    Ethnic background / Risk     couple:  1 in 2,500   couple:  1 in 15,000            couple:  1 in 8,000     American couple:  1 in 32,000     WHAT TESTING IS AVAILABLE?  · There is a blood test that can be done to find out if you or your partner is a carrier.  · It is important to understand that CF carrier testing does not detect all CF carriers.  · If the test shows that you are both CF carriers, you unborn  baby can be tested to find out if the baby has CF.    HOW MUCH DOES IT COST TO HAVE CYSTIC FIBROSIS CARRIER TESTING?  · Cost and insurance coverage for CF carrier testing vary depending upon the laboratory used and your insurance policy.  · The average cost for CF carrier testing is $300 per person.  · Your genetic counselor can provide you with more information about cystic fibrosis carrier testing.    _____  Yes, I am interested in discussing carrier testing with a genetic counselor.    _____  No, I am not interested in CF carrier testing or in receiving more information about CF carrier testing.      Client signature: ________________________________________  4/17/2020

## 2020-04-21 LAB
C TRACH DNA GENITAL QL NAA+PROBE: NEGATIVE
CYTOLOGY REG CYTOL: NORMAL
N GONORRHOEA DNA GENITAL QL NAA+PROBE: NEGATIVE
SPECIMEN SOURCE: NORMAL

## 2020-05-11 ENCOUNTER — HOSPITAL ENCOUNTER (OUTPATIENT)
Dept: LAB | Facility: MEDICAL CENTER | Age: 30
End: 2020-05-11
Attending: NURSE PRACTITIONER
Payer: COMMERCIAL

## 2020-05-11 DIAGNOSIS — O09.92 ENCOUNTER FOR SUPERVISION OF HIGH RISK PREGNANCY IN SECOND TRIMESTER, ANTEPARTUM: ICD-10-CM

## 2020-05-11 LAB
ABO GROUP BLD: NORMAL
APPEARANCE UR: CLEAR
BASOPHILS # BLD AUTO: 0.3 % (ref 0–1.8)
BASOPHILS # BLD: 0.03 K/UL (ref 0–0.12)
BILIRUB UR QL STRIP.AUTO: NEGATIVE
BLD GP AB SCN SERPL QL: NORMAL
COLOR UR: YELLOW
EOSINOPHIL # BLD AUTO: 0.34 K/UL (ref 0–0.51)
EOSINOPHIL NFR BLD: 2.9 % (ref 0–6.9)
ERYTHROCYTE [DISTWIDTH] IN BLOOD BY AUTOMATED COUNT: 41 FL (ref 35.9–50)
GLUCOSE UR STRIP.AUTO-MCNC: NEGATIVE MG/DL
HBV SURFACE AG SER QL: ABNORMAL
HCT VFR BLD AUTO: 38 % (ref 37–47)
HCV AB SER QL: NORMAL
HGB BLD-MCNC: 13 G/DL (ref 12–16)
HIV 1+2 AB+HIV1 P24 AG SERPL QL IA: NORMAL
IMM GRANULOCYTES # BLD AUTO: 0.08 K/UL (ref 0–0.11)
IMM GRANULOCYTES NFR BLD AUTO: 0.7 % (ref 0–0.9)
KETONES UR STRIP.AUTO-MCNC: ABNORMAL MG/DL
LEUKOCYTE ESTERASE UR QL STRIP.AUTO: NEGATIVE
LYMPHOCYTES # BLD AUTO: 1.89 K/UL (ref 1–4.8)
LYMPHOCYTES NFR BLD: 16.1 % (ref 22–41)
MCH RBC QN AUTO: 31.3 PG (ref 27–33)
MCHC RBC AUTO-ENTMCNC: 34.2 G/DL (ref 33.6–35)
MCV RBC AUTO: 91.6 FL (ref 81.4–97.8)
MICRO URNS: ABNORMAL
MONOCYTES # BLD AUTO: 0.62 K/UL (ref 0–0.85)
MONOCYTES NFR BLD AUTO: 5.3 % (ref 0–13.4)
NEUTROPHILS # BLD AUTO: 8.81 K/UL (ref 2–7.15)
NEUTROPHILS NFR BLD: 74.7 % (ref 44–72)
NITRITE UR QL STRIP.AUTO: NEGATIVE
NRBC # BLD AUTO: 0 K/UL
NRBC BLD-RTO: 0 /100 WBC
PH UR STRIP.AUTO: 5.5 [PH] (ref 5–8)
PLATELET # BLD AUTO: 236 K/UL (ref 164–446)
PMV BLD AUTO: 10.4 FL (ref 9–12.9)
PROT UR QL STRIP: NEGATIVE MG/DL
RBC # BLD AUTO: 4.15 M/UL (ref 4.2–5.4)
RBC UR QL AUTO: NEGATIVE
RH BLD: NORMAL
RUBV AB SER QL: 25.9 IU/ML
SP GR UR STRIP.AUTO: 1.03
TREPONEMA PALLIDUM IGG+IGM AB [PRESENCE] IN SERUM OR PLASMA BY IMMUNOASSAY: ABNORMAL
UROBILINOGEN UR STRIP.AUTO-MCNC: 1 MG/DL
WBC # BLD AUTO: 11.8 K/UL (ref 4.8–10.8)

## 2020-05-11 PROCEDURE — 36415 COLL VENOUS BLD VENIPUNCTURE: CPT

## 2020-05-11 PROCEDURE — 81003 URINALYSIS AUTO W/O SCOPE: CPT | Mod: XU

## 2020-05-11 PROCEDURE — 86850 RBC ANTIBODY SCREEN: CPT

## 2020-05-11 PROCEDURE — 86901 BLOOD TYPING SEROLOGIC RH(D): CPT

## 2020-05-11 PROCEDURE — 87389 HIV-1 AG W/HIV-1&-2 AB AG IA: CPT

## 2020-05-11 PROCEDURE — 87340 HEPATITIS B SURFACE AG IA: CPT

## 2020-05-11 PROCEDURE — 85025 COMPLETE CBC W/AUTO DIFF WBC: CPT

## 2020-05-11 PROCEDURE — 86900 BLOOD TYPING SEROLOGIC ABO: CPT

## 2020-05-11 PROCEDURE — 86762 RUBELLA ANTIBODY: CPT

## 2020-05-11 PROCEDURE — 80307 DRUG TEST PRSMV CHEM ANLYZR: CPT

## 2020-05-11 PROCEDURE — 86803 HEPATITIS C AB TEST: CPT

## 2020-05-11 PROCEDURE — 86780 TREPONEMA PALLIDUM: CPT

## 2020-05-13 PROBLEM — F12.90 MARIJUANA USE: Status: ACTIVE | Noted: 2020-05-13

## 2020-05-13 LAB
AMPHET CTO UR CFM-MCNC: NEGATIVE NG/ML
BARBITURATES CTO UR CFM-MCNC: NEGATIVE NG/ML
BENZODIAZ CTO UR CFM-MCNC: NEGATIVE NG/ML
CANNABINOIDS CTO UR CFM-MCNC: POSITIVE NG/ML
COCAINE CTO UR CFM-MCNC: NEGATIVE NG/ML
DRUG COMMENT 753798: NORMAL
METHADONE CTO UR CFM-MCNC: NEGATIVE NG/ML
OPIATES CTO UR CFM-MCNC: NEGATIVE NG/ML
PCP CTO UR CFM-MCNC: NEGATIVE NG/ML
PROPOXYPH CTO UR CFM-MCNC: NEGATIVE NG/ML

## 2020-05-14 LAB — THC UR CFM-MCNC: >500 NG/ML

## 2020-05-14 NOTE — PROGRESS NOTES
MABEL:  16w6d    Pt reports doing well.  Reports +FM, Denies vaginal bleeding, contractions, LOF.    LMP 2020 (Approximate)   gen: AAO, NAD  FHTs: 140    A/P: 29 y.o.  @ 16w6d by 8w6d US  #PNL up to date, Rh+; anatomy US scheduled  #h/o CS desires TOLAC see below  #Plans to BF - classes discussed  #Prenatal precautions/guidance discussed  #Unsure of PPBCM - discussed BTL if has CS and she will consider    RTC 4wks    TOLAC Counseling Note       Summary of prior delivery history: failed induction at 39w1d for polyhydramnios      Prior  History:    Gestation Age: 39w1d    Indication for : failed induction    Labor: no    What hospital/state/country was  done: here    Any post-operative counseling regarding mode of delivery for next pregnancy:  no    Operative Note reviewed: yes - [facility]       Patient was counseled regarding the benefits and risks of trial of labor after a prior  versus a repeat . These general risks and benefits are included in the institutional consent that the patient signed at the clinic. Basically the patient was told that a successful vaginal delivery is the safest mode for mother and baby. A repeat  section prior to labor or in early labor is almost as safe for the mother as a vaginal delivery and minimizes the risk of uterine rupture but does carry a slight increase risk of excessive bleeding, infection or injury to adjacent organs. The mode of delivery with the greatest risk of complications is a  in active labor. A successful vaginal delivery has less risk of major hemorrhage and infection and typically a shorter hospital stay, however there is the risk of uterine rupture. A repeat  also increases the risk of subsequent pregnancies being delivered by .       In particular the patient was told that with spontaneous labor her chances of a successful trial of labor was 70% and her risk of uterine rupture was  <1%. These percentages were influenced by her history of ethnicity, BMI, reason for CS, delivery history, and type of uterine scar.       Based on this counseling the patient indicates at this time a preference for       TOLAC            She knows that she may change her mind at a later date. She has also been told that she may be advised by the medical staff later in pregnancy or during labor that she should consider a different mode of delivery given new developments or complications in her pregnancy.          Counseling time was 10 minutes to discuss TOLAC.

## 2020-05-15 ENCOUNTER — ROUTINE PRENATAL (OUTPATIENT)
Dept: OBGYN | Facility: CLINIC | Age: 30
End: 2020-05-15
Payer: COMMERCIAL

## 2020-05-15 VITALS — BODY MASS INDEX: 31.6 KG/M2 | WEIGHT: 195.8 LBS | DIASTOLIC BLOOD PRESSURE: 50 MMHG | SYSTOLIC BLOOD PRESSURE: 100 MMHG

## 2020-05-15 DIAGNOSIS — O34.219 HISTORY OF CESAREAN DELIVERY, CURRENTLY PREGNANT: ICD-10-CM

## 2020-05-15 DIAGNOSIS — F12.90 MARIJUANA USE: ICD-10-CM

## 2020-05-15 DIAGNOSIS — O09.92 ENCOUNTER FOR SUPERVISION OF HIGH RISK PREGNANCY IN SECOND TRIMESTER, ANTEPARTUM: ICD-10-CM

## 2020-05-15 PROCEDURE — 90040 PR PRENATAL FOLLOW UP: CPT | Performed by: OBSTETRICS & GYNECOLOGY

## 2020-05-15 NOTE — PROGRESS NOTES
OB follow up   + fetal movement. Flutters  No VB, LOF or UC's.  Wt: 195.8LBS      BP: 100/50  Phone # [phone]  Preferred pharmacy confirmed.  White Discharge denies all other symptoms   US scheduled for 6/8/2020

## 2020-06-08 ENCOUNTER — APPOINTMENT (OUTPATIENT)
Dept: RADIOLOGY | Facility: IMAGING CENTER | Age: 30
End: 2020-06-08
Attending: NURSE PRACTITIONER
Payer: COMMERCIAL

## 2020-06-08 DIAGNOSIS — O09.92 ENCOUNTER FOR SUPERVISION OF HIGH RISK PREGNANCY IN SECOND TRIMESTER, ANTEPARTUM: ICD-10-CM

## 2020-06-08 PROCEDURE — 76805 OB US >/= 14 WKS SNGL FETUS: CPT | Mod: TC | Performed by: OBSTETRICS & GYNECOLOGY

## 2020-06-12 ENCOUNTER — ROUTINE PRENATAL (OUTPATIENT)
Dept: OBGYN | Facility: CLINIC | Age: 30
End: 2020-06-12
Payer: COMMERCIAL

## 2020-06-12 VITALS — DIASTOLIC BLOOD PRESSURE: 58 MMHG | BODY MASS INDEX: 32.77 KG/M2 | WEIGHT: 203 LBS | SYSTOLIC BLOOD PRESSURE: 116 MMHG

## 2020-06-12 DIAGNOSIS — O09.92 ENCOUNTER FOR SUPERVISION OF HIGH RISK PREGNANCY IN SECOND TRIMESTER, ANTEPARTUM: ICD-10-CM

## 2020-06-12 PROCEDURE — 90040 PR PRENATAL FOLLOW UP: CPT | Performed by: PHYSICIAN ASSISTANT

## 2020-06-12 NOTE — PROGRESS NOTES
Pt has no complaints with cramping, bleeding or pain. +FM. US wnl - pt notified of results. Pt gained 8 lb since last visit, so diet d/w pt today. 1hr GTT next visit. AFP slip given today with instructions - urged to do asap. States she wants TOLAC, but unsure about BTL if C/S needed. RTC 4 wk or sooner prn.

## 2020-06-12 NOTE — PROGRESS NOTES
Pt here today for OB follow up  Reports +FM  WT: 203 lb  BP: 116/58  Desires AFP  Preferred pharmacy verified with pt.  Pt states no other complaints or concerns today  Had US on 06/08/20  Good # [phone]

## 2020-06-17 ENCOUNTER — HOSPITAL ENCOUNTER (OUTPATIENT)
Dept: LAB | Facility: MEDICAL CENTER | Age: 30
End: 2020-06-17
Attending: PHYSICIAN ASSISTANT
Payer: COMMERCIAL

## 2020-06-17 DIAGNOSIS — O09.92 ENCOUNTER FOR SUPERVISION OF HIGH RISK PREGNANCY IN SECOND TRIMESTER, ANTEPARTUM: ICD-10-CM

## 2020-06-17 PROCEDURE — 81511 FTL CGEN ABNOR FOUR ANAL: CPT

## 2020-06-17 PROCEDURE — 36415 COLL VENOUS BLD VENIPUNCTURE: CPT

## 2020-06-19 LAB
# FETUSES US: NORMAL
AFP MOM SERPL: 0.98
AFP SERPL-MCNC: 59 NG/ML
AGE - REPORTED: 29 YR
CURRENT SMOKER: NO
FAMILY MEMBER DISEASES HX: NO
GA METHOD: NORMAL
GA: NORMAL WK
HCG MOM SERPL: 0.94
HCG SERPL-ACNC: NORMAL IU/L
HX OF HEREDITARY DISORDERS: NO
IDDM PATIENT QL: NO
INHIBIN A MOM SERPL: 1.46
INHIBIN A SERPL-MCNC: 272 PG/ML
INTEGRATED SCN PATIENT-IMP: NORMAL
PATHOLOGY STUDY: NORMAL
SPECIMEN DRAWN SERPL: NORMAL
U ESTRIOL MOM SERPL: 1.05
U ESTRIOL SERPL-MCNC: 2.88 NG/ML

## 2020-07-10 ENCOUNTER — ROUTINE PRENATAL (OUTPATIENT)
Dept: OBGYN | Facility: CLINIC | Age: 30
End: 2020-07-10
Payer: COMMERCIAL

## 2020-07-10 VITALS — DIASTOLIC BLOOD PRESSURE: 60 MMHG | WEIGHT: 207 LBS | BODY MASS INDEX: 33.41 KG/M2 | SYSTOLIC BLOOD PRESSURE: 110 MMHG

## 2020-07-10 DIAGNOSIS — O09.92 ENCOUNTER FOR SUPERVISION OF HIGH RISK PREGNANCY IN SECOND TRIMESTER, ANTEPARTUM: Primary | ICD-10-CM

## 2020-07-10 PROCEDURE — 90040 PR PRENATAL FOLLOW UP: CPT | Performed by: NURSE PRACTITIONER

## 2020-07-10 NOTE — PROGRESS NOTES
Pt here today for OB follow up  Pt states no complaints   Reports +  Good # [phone]  Pharmacy Confirmed.  Chaperone offered and not indicated.   Pt given 1 hr GTT and instructions

## 2020-07-10 NOTE — PROGRESS NOTES
S) Pt is a 29 y.o.   at 25w0d  gestation. Routine prenatal care today. Without complaints today.  Had 1 episode last week where she thinks she didn't drink enough water and did not feel very well.    Fetal movement Normal  Cramping no  VB no  LOF no   Denies dysuria. Generally feels well today. Good self-care activities identified. Denies headaches, swelling, visual changes, or epigastric pain .     O) See flow sheet for vital signs and fetal measurements.          Labs:       PNL:WNL       GCT:       AFP: normal       GBS: N/A       Pertinent ultrasound - 20 RONEL 19.9, survey WNL, c/w prev dating           A) IUP at 25w0d       S=D         Patient Active Problem List    Diagnosis Date Noted   • Marijuana use 2020   • Encounter for supervision of high risk pregnancy in second trimester, antepartum 2020   • History of C/S x1 - desires TOLAC, considering BTL if C/S needed 2020   • Vitamin D deficiency 2016          SVE: deferred         TDAP: no       FLU: no        BTL: no       : no       C/S Consent: no       IOL or C/S scheduled: no       LAST PAP: 20 negative         P) s/s ptl vs general discomforts. Fetal movements reviewed. General ed and anticipatory guidance. Nutrition/exercise/vitamin. Plans breast Plans pp contraception- unsure  Continue PNV.   Discussed adding more water, fruits/vegs to diet as well as daily walking  Given 3rd trimester labs with instructions  Discussed TDAP at next visit  RTC 3 weeks or PRN.

## 2020-07-30 ENCOUNTER — HOSPITAL ENCOUNTER (OUTPATIENT)
Dept: LAB | Facility: MEDICAL CENTER | Age: 30
End: 2020-07-30
Attending: NURSE PRACTITIONER
Payer: COMMERCIAL

## 2020-07-30 DIAGNOSIS — O09.92 ENCOUNTER FOR SUPERVISION OF HIGH RISK PREGNANCY IN SECOND TRIMESTER, ANTEPARTUM: ICD-10-CM

## 2020-07-30 LAB
ERYTHROCYTE [DISTWIDTH] IN BLOOD BY AUTOMATED COUNT: 40.4 FL (ref 35.9–50)
GLUCOSE 1H P 50 G GLC PO SERPL-MCNC: 134 MG/DL (ref 70–139)
HCT VFR BLD AUTO: 36.4 % (ref 37–47)
HGB BLD-MCNC: 12.3 G/DL (ref 12–16)
MCH RBC QN AUTO: 30.6 PG (ref 27–33)
MCHC RBC AUTO-ENTMCNC: 33.8 G/DL (ref 33.6–35)
MCV RBC AUTO: 90.5 FL (ref 81.4–97.8)
PLATELET # BLD AUTO: 222 K/UL (ref 164–446)
PMV BLD AUTO: 10 FL (ref 9–12.9)
RBC # BLD AUTO: 4.02 M/UL (ref 4.2–5.4)
TREPONEMA PALLIDUM IGG+IGM AB [PRESENCE] IN SERUM OR PLASMA BY IMMUNOASSAY: NORMAL
WBC # BLD AUTO: 13.7 K/UL (ref 4.8–10.8)

## 2020-07-30 PROCEDURE — 36415 COLL VENOUS BLD VENIPUNCTURE: CPT

## 2020-07-30 PROCEDURE — 82950 GLUCOSE TEST: CPT

## 2020-07-30 PROCEDURE — 86780 TREPONEMA PALLIDUM: CPT

## 2020-07-30 PROCEDURE — 85027 COMPLETE CBC AUTOMATED: CPT

## 2020-08-06 ENCOUNTER — ROUTINE PRENATAL (OUTPATIENT)
Dept: OBGYN | Facility: CLINIC | Age: 30
End: 2020-08-06
Payer: COMMERCIAL

## 2020-08-06 VITALS — SYSTOLIC BLOOD PRESSURE: 112 MMHG | DIASTOLIC BLOOD PRESSURE: 60 MMHG | WEIGHT: 208.8 LBS | BODY MASS INDEX: 33.7 KG/M2

## 2020-08-06 DIAGNOSIS — O09.93 ENCOUNTER FOR SUPERVISION OF HIGH RISK PREGNANCY IN THIRD TRIMESTER, ANTEPARTUM: Primary | ICD-10-CM

## 2020-08-06 PROCEDURE — 3074F SYST BP LT 130 MM HG: CPT | Performed by: NURSE PRACTITIONER

## 2020-08-06 PROCEDURE — 3078F DIAST BP <80 MM HG: CPT | Performed by: NURSE PRACTITIONER

## 2020-08-06 PROCEDURE — 90040 PR PRENATAL FOLLOW UP: CPT | Performed by: NURSE PRACTITIONER

## 2020-08-06 NOTE — PROGRESS NOTES
OB follow up   + fetal movement. Active  No VB, LOF or UC's.  Wt: 208.8LBS       BP: 112/60  Phone # [phone]  Preferred pharmacy confirmed.  No complaints as of today     tdap today   BTL would like to think about  Kick count sheet given today.    NDC: 10408-464-43  LOT#: [id number]  Expiration Date: 2021  Dose: 0.5ml  Site: Right Deltoid  Patient educated on use and side effects of medication. Name and  verified prior to injection. Pt tolerated? Well   Verified by Charisma  Administered by Shamika Contreras, Med Ass't at 3:51 PM.  Patient Provided Medication: no

## 2020-08-06 NOTE — PROGRESS NOTES
S:  Pt is  at 28w6d for routine OB follow up.  No concerns today.  No ED or hospital visits since last seen. Reports good FM.  Denies VB, LOF, RUCs or vaginal DC.    O:  Please see above vitals.        FHTs: 140        Fundal ht: 29 cm.        S=D            A:  IUP at 28w6d  Patient Active Problem List    Diagnosis Date Noted   • Marijuana use 2020   • Encounter for supervision of high risk pregnancy in second trimester, antepartum 2020   • History of C/S x1 - desires TOLAC, considering BTL if C/S needed 2020   • Vitamin D deficiency 2016        P:  1.  Considering BTL.          2.  Instructions given on FKCs.          3.  Questions answered.          4.  Encouraged pt to tour L&D.          5.  Encourage adequate water intake.        6.   labor precautions reviewed.         7.  F/u 2 wks.        8.  TDap today.

## 2020-08-06 NOTE — LETTER
"Count Your Baby's Movements  Another step to a healthy delivery    Kyara Jones             Dept: [phone]    How Many Weeks Pregnant? 28w6d    Date to Begin Countin2020              How to use this chart    One way for your physician to keep track of your baby's health is by knowing how often the baby moves (or \"kicks\") in your womb.  You can help your physician to do this by using this chart every day.    Every day, you should see how many hours it takes for your baby to move 10 times.  Start in the morning, as soon as you get up.    · First, write down the time your baby moves until you get to 10.  · Check off one box every time your baby moves until you get to 10.  · Write down the time you finished counting in the last column.  · Total how long it took to count up all 10 movements.  · Finally, fill in the box that shows how long this took.  After counting 10 movements, you no longer have to count any more that day.  The next morning, just start counting again as soon as you get up.    What should you call a \"movement\"?  It is hard to say, because it will feel different from one mother to another and from one pregnancy to the next.  The important thing is that you count the movements the same way throughout your pregnancy.  If you have more questions, you should ask your physician.    Count carefully every day!  SAMPLE:  Week 28    How many hours did it take to feel 10 movements?       Start  Time     1     2     3     4     5     6     7     8     9     10   Finish Time   Mon 8:20 ·  ·  ·  ·  ·  ·  ·  ·  ·  ·  11:40                  Sat               Sun                 IMPORTANT: You should contact your physician if it takes more than two hours for you to feel 10 movements.  Each morning, write down the time and start to count the movements of your baby.  Keep track by checking off one box every time you feel one movement.  When you have " "felt 10 \"kicks\", write down the time you finished counting in the last column.  Then fill in the   box (over the check monique) for the number of hours it took.  Be sure to read the complete instructions on the previous page.            "

## 2020-08-19 ENCOUNTER — ROUTINE PRENATAL (OUTPATIENT)
Dept: OBGYN | Facility: CLINIC | Age: 30
End: 2020-08-19
Payer: COMMERCIAL

## 2020-08-19 VITALS — BODY MASS INDEX: 33.27 KG/M2 | DIASTOLIC BLOOD PRESSURE: 60 MMHG | WEIGHT: 206.1 LBS | SYSTOLIC BLOOD PRESSURE: 116 MMHG

## 2020-08-19 DIAGNOSIS — O34.219 HISTORY OF CESAREAN DELIVERY, CURRENTLY PREGNANT: ICD-10-CM

## 2020-08-19 PROBLEM — O09.93 ENCOUNTER FOR SUPERVISION OF HIGH RISK PREGNANCY IN THIRD TRIMESTER, ANTEPARTUM: Status: ACTIVE | Noted: 2020-04-17

## 2020-08-19 PROCEDURE — 90040 PR PRENATAL FOLLOW UP: CPT | Performed by: NURSE PRACTITIONER

## 2020-08-19 NOTE — PROGRESS NOTES
OB follow up   + fetal movement.Active  No VB, LOF or UC's.  Wt: 206.1LBS       BP:116/60  Phone # [phone]  Preferred pharmacy confirmed.  No complaints as of today

## 2020-08-19 NOTE — PROGRESS NOTES
SUBJECTIVE:  Pt is a 29 y.o.   at 30w5d  gestation. Presents today for follow-up prenatal care. Reports no issues at this time.  Reports good fetal movement. Denies regular cramping/contractions, bleeding or leaking of fluid. Denies dysuria, headaches, N/V. Generally feels well today.     OBJECTIVE:  - See prenatal vitals flow  -   Vitals:    20 1600   BP: 116/60   Weight: 93.5 kg (206 lb 1.6 oz)                 ASSESSMENT:   - IUP at 30w5d    - S=D   -   Patient Active Problem List    Diagnosis Date Noted   • History of C/S x1 - desires TOLAC, considering BTL if C/S needed 2020         PLAN:  - S/sx pregnancy and labor warning signs vs general discomforts discussed  - Fetal movements and/or kick counts reviewed   - Adequate hydration reinforced  - Nutrition/exercise/vitamin education; continue PNV  - S/p Tdap vacc   - Still desiring TOLAC   - Anticipatory guidance given  - RTC in 2 weeks for follow-up prenatal care

## 2020-09-04 ENCOUNTER — ROUTINE PRENATAL (OUTPATIENT)
Dept: OBGYN | Facility: CLINIC | Age: 30
End: 2020-09-04
Payer: COMMERCIAL

## 2020-09-04 VITALS — WEIGHT: 207 LBS | BODY MASS INDEX: 33.41 KG/M2 | SYSTOLIC BLOOD PRESSURE: 112 MMHG | DIASTOLIC BLOOD PRESSURE: 58 MMHG

## 2020-09-04 DIAGNOSIS — O34.219 HISTORY OF CESAREAN DELIVERY, CURRENTLY PREGNANT: ICD-10-CM

## 2020-09-04 DIAGNOSIS — O09.893 SUPERVISION OF OTHER HIGH RISK PREGNANCIES, THIRD TRIMESTER: Primary | ICD-10-CM

## 2020-09-04 PROCEDURE — 90040 PR PRENATAL FOLLOW UP: CPT | Performed by: NURSE PRACTITIONER

## 2020-09-04 NOTE — PROGRESS NOTES
Pt here today for OB follow up  Reports +FM  WT: 207 lb  BP: 112/58  Preferred pharmacy verified with pt.  Declines BTL  Pt states no complaints or concerns today  Good # [phone]

## 2020-09-04 NOTE — PROGRESS NOTES
S:  Pt is  at 33w0d for routine OB follow up.  No c/o today.  Reports good FM.  Denies VB, LOF, RUCs or vaginal DC.  Denies cough, SOB, sore throat or fever.  Denies exposure to anyone with COVID 19. Plans on TOLAC.    O:  Please see above vitals.        FHTs: 145        Fundal ht: 33 cm.    A:  IUP at 33w0d  Patient Active Problem List    Diagnosis Date Noted   • Supervision of other high risk pregnancies, third trimester 2020   • History of C/S x1 - desires TOLAC, considering BTL if C/S needed 2020        P:  1.  GBS @ 36 wks.          2.  Continue FKCs.          3.  Questions answered.          4.  L&D policies reviewed w pt.        5.  Encourage adequate water intake.        6.  F/u 2 wks w MD to discuss delivery mode.

## 2020-09-04 NOTE — PATIENT INSTRUCTIONS
P:  1.  GBS @ 36 wks.          2.  Continue FKCs.          3.  Questions answered.          4.  L&D policies reviewed w pt.        5.  Encourage adequate water intake.        6.  F/u 2 wks w MD to discuss delivery mode.

## 2020-09-10 PROCEDURE — 90471 IMMUNIZATION ADMIN: CPT | Performed by: NURSE PRACTITIONER

## 2020-09-10 PROCEDURE — 90715 TDAP VACCINE 7 YRS/> IM: CPT | Performed by: NURSE PRACTITIONER

## 2020-09-14 ENCOUNTER — NURSE TRIAGE (OUTPATIENT)
Dept: HEALTH INFORMATION MANAGEMENT | Facility: OTHER | Age: 30
End: 2020-09-14

## 2020-09-14 ENCOUNTER — TELEPHONE (OUTPATIENT)
Dept: OBGYN | Facility: CLINIC | Age: 30
End: 2020-09-14

## 2020-09-14 NOTE — TELEPHONE ENCOUNTER
Pt called, her sig other tested positive for covid last week. She was tested and is awaiting her results. Has an appt tmrw and wants to know what she should do.     1. Caller Name: Kyara Jones                  Call Back Number: [phone] (home)     [facility] PCP or Specialty Provider: Yes Nabila Rashid        2.  In the last two weeks, has the patient had any new or worsening symptoms (not explained by alternative diagnosis)? Yes, the patient reports the following COVID-19 consistent symptoms: sore throat.    3.  Does patient have any comoribidities? None     4.  Has the patient traveled in the last 14 days OR had any known contact with someone who is suspected or confirmed to have COVID-19?  Yes, Boyfriend tested positive, pt is awaiting results.     5. POTENTIAL PUI (LOW): Advised to perform self care, monitor for worsening symptoms and to call back in 3 days if no improvement. Instructed to self isolate and contact [facility] at [phone]     [facility]  MA said they will call patient to reschedule her appt later in the day.     Note routed to [facility] Provider: FERMIN only.

## 2020-09-30 ENCOUNTER — HOSPITAL ENCOUNTER (OUTPATIENT)
Facility: MEDICAL CENTER | Age: 30
End: 2020-09-30
Attending: OBSTETRICS & GYNECOLOGY
Payer: COMMERCIAL

## 2020-09-30 ENCOUNTER — ROUTINE PRENATAL (OUTPATIENT)
Dept: OBGYN | Facility: CLINIC | Age: 30
End: 2020-09-30
Payer: COMMERCIAL

## 2020-09-30 VITALS — SYSTOLIC BLOOD PRESSURE: 102 MMHG | WEIGHT: 214 LBS | BODY MASS INDEX: 34.54 KG/M2 | DIASTOLIC BLOOD PRESSURE: 56 MMHG

## 2020-09-30 DIAGNOSIS — O09.93 SUPERVISION OF HIGH RISK PREGNANCY IN THIRD TRIMESTER: ICD-10-CM

## 2020-09-30 DIAGNOSIS — O34.219 HISTORY OF CESAREAN DELIVERY, CURRENTLY PREGNANT: ICD-10-CM

## 2020-09-30 PROCEDURE — 87081 CULTURE SCREEN ONLY: CPT

## 2020-09-30 PROCEDURE — 90686 IIV4 VACC NO PRSV 0.5 ML IM: CPT | Performed by: OBSTETRICS & GYNECOLOGY

## 2020-09-30 PROCEDURE — 90471 IMMUNIZATION ADMIN: CPT | Performed by: OBSTETRICS & GYNECOLOGY

## 2020-09-30 PROCEDURE — 87150 DNA/RNA AMPLIFIED PROBE: CPT

## 2020-09-30 PROCEDURE — 90040 PR PRENATAL FOLLOW UP: CPT | Performed by: OBSTETRICS & GYNECOLOGY

## 2020-09-30 NOTE — PROGRESS NOTES
29 y.o.  36w5d The patient is here for routine obstetrical followup. She reports good fetal movement. She denies contractions, vaginal bleeding, or leaking of fluid.    The patient's pregnancy is complicated by   Patient Active Problem List    Diagnosis Date Noted   • Supervision of other high risk pregnancies, third trimester 2020   • History of C/S x1 - desires TOLAC, considering BTL if C/S needed 2020     GBS done today  Flu shot administered    Patient has a history of previous  delivery (low transverse incision with 2 layer closure for failed induction) and is desiring trial of labor after . Discussed with patient today are the risks of trial of labor after  which include uterine rupture risk of one in 1000. Discussed with patient in the event of uterine rupture, immediate emergency  delivery will be performed. There is no guarantee that  can be performed an adequate amount of time to prevent fetal brain damage or death. Also discussed with patient increase risks of hysterectomy and blood transfusion associated with failed vaginal birth after . Patient understands risks as described, and would like to proceed with trial of labor after .  If she has a  section, she does not desire BTL.    Followup in 1 week  Labor precautions were discussed with patient  Fetal kick counts were discussed with patient

## 2020-09-30 NOTE — PROGRESS NOTES
Pt here today for OB follow up  GBS to be done today  Reports +FM  WT: 214 lb  BP: 102/56  Preferred pharmacy verified with pt.  Pt states no complaints or concerns today  Desires the flu vaccine  Good # [phone]    Influenza vaccine given today. Right Deltoid. VIS given and screening check list reviewed with pt.  Influenza vaccine verified by Stephanie Mayfield MA

## 2020-10-02 LAB — GP B STREP DNA SPEC QL NAA+PROBE: NEGATIVE

## 2020-10-09 ENCOUNTER — ROUTINE PRENATAL (OUTPATIENT)
Dept: OBGYN | Facility: CLINIC | Age: 30
End: 2020-10-09
Payer: COMMERCIAL

## 2020-10-09 VITALS — SYSTOLIC BLOOD PRESSURE: 108 MMHG | WEIGHT: 216.8 LBS | DIASTOLIC BLOOD PRESSURE: 60 MMHG | BODY MASS INDEX: 34.99 KG/M2

## 2020-10-09 DIAGNOSIS — U07.1 COVID-19: ICD-10-CM

## 2020-10-09 DIAGNOSIS — O34.219 HISTORY OF CESAREAN DELIVERY, CURRENTLY PREGNANT: ICD-10-CM

## 2020-10-09 DIAGNOSIS — O09.893 SUPERVISION OF OTHER HIGH RISK PREGNANCIES, THIRD TRIMESTER: Primary | ICD-10-CM

## 2020-10-09 PROCEDURE — 90040 PR PRENATAL FOLLOW UP: CPT | Performed by: NURSE PRACTITIONER

## 2020-10-09 NOTE — PROGRESS NOTES
Pt. Here for OB/FU. Reports Good FM.   Good # [phone]  Pt. Denies VB, LOF, or UC's.   Pharmacy verified.   Chaperone offered and not indicated  Pt states no complaints or concerns today

## 2020-10-09 NOTE — PROGRESS NOTES
S:  Pt is  at 38w0d here for routine OB follow up.  No c/o today.  Reports good FM.  Denies VB, LOF, RUCs, or vaginal DC. Denies cough, SOB, sore throat or fever.  Pt does report testing positive for COVID at the beginning of Sept - she had a test at her work ([employer]) and was asymptomatic.  She has not returned to work.     O:    Vitals:    10/09/20 1039   BP: 108/60   Weight: 98.3 kg (216 lb 12.8 oz)           FHTs: 140        Fundal ht: 37        Fetal position: vertex        SVE: /-2        GBS neg on 20 -- reviewed w pt.      A:  IUP at 38w0d  Patient Active Problem List    Diagnosis Date Noted   • COVID-19 10/09/2020   • Supervision of other high risk pregnancies, third trimester 2020   • History of C/S x1 - desires TOLAC, considering BTL if C/S needed 2020       P:  1.  Continue FKCs.         2.  Labor precautions given.  Instructions given on where to go.  Pt receptive to education.         3.  D/w pt IOL/RCS policy.  IOL referral placed per Dr. Don for 10/17-10/20. Pt aware that IOL could convert to RCS if not favorable/progressing.        4.  Questions answered.         5.  Encouraged adequate water intake       6.  F/u 1wk    Chaperone offered and provided by Patricia Saldaña MA    Consulted w Dr. Don regarding pt and POC.

## 2020-10-09 NOTE — PATIENT INSTRUCTIONS
P:  1.  Continue FKCs.         2.  Labor precautions given.  Instructions given on where to go.  Pt receptive to education.         3.  D/w pt IOL/RCS policy.  IOL referral placed per Dr. Don for 10/17-10/20. Pt aware that IOL could convert to RCS if not favorable/progressing.        4.  Questions answered.         5.  Encouraged adequate water intake       6.  F/u 1wk

## 2020-10-15 ENCOUNTER — ROUTINE PRENATAL (OUTPATIENT)
Dept: OBGYN | Facility: CLINIC | Age: 30
End: 2020-10-15
Payer: COMMERCIAL

## 2020-10-15 VITALS — SYSTOLIC BLOOD PRESSURE: 110 MMHG | WEIGHT: 220 LBS | BODY MASS INDEX: 35.51 KG/M2 | DIASTOLIC BLOOD PRESSURE: 64 MMHG

## 2020-10-15 DIAGNOSIS — O09.893 SUPERVISION OF OTHER HIGH RISK PREGNANCIES, THIRD TRIMESTER: Primary | ICD-10-CM

## 2020-10-15 DIAGNOSIS — O34.219 HISTORY OF CESAREAN DELIVERY, CURRENTLY PREGNANT: ICD-10-CM

## 2020-10-15 DIAGNOSIS — U07.1 COVID-19: ICD-10-CM

## 2020-10-15 PROCEDURE — 90040 PR PRENATAL FOLLOW UP: CPT | Performed by: NURSE PRACTITIONER

## 2020-10-15 NOTE — PATIENT INSTRUCTIONS
P:  1.  Continue FKCs.         2.  Labor precautions given.  Instructions given on where to go.  Pt receptive to education.         3.  D/w pt IOL/RCS policy.  IOL info given to pt.        4.  Questions answered.         5.  Encouraged adequate water intake       6.  F/u PP

## 2020-10-15 NOTE — PROGRESS NOTES
S:  Pt is  at 38w6d here for routine OB follow up.  No c/o today.  Reports good FM.  Denies VB, LOF, RUCs, or vaginal DC. Denies cough, SOB, sore throat or fever.  Denies exposure to anyone with COVID 19.    O:    Vitals:    10/15/20 1612   BP: 110/64   Weight: 99.8 kg (220 lb)           FHTs: 135        Fundal ht: 37        Fetal position: vertex        SVE: deferred        GBS neg on 20 -- reviewed w pt.      A:  IUP at 38w6d  Patient Active Problem List    Diagnosis Date Noted   • COVID-19 10/09/2020   • Supervision of other high risk pregnancies, third trimester 2020   • History of C/S x1 - desires TOLAC, considering BTL if C/S needed 2020       P:  1.  Continue FKCs.         2.  Labor precautions given.  Instructions given on where to go.  Pt receptive to education.         3.  D/w pt IOL/RCS policy.  IOL info given to pt.        4.  Questions answered.         5.  Encouraged adequate water intake       6.  F/u PP

## 2020-10-15 NOTE — PROGRESS NOTES
Pt here today for OB follow up  Negative GBS, pt aware  Reports +FM  WT: 220 lb  BP: 110/64  Preferred pharmacy verified with pt.  Patient is scheduled for IOL on Wed 10/21/20 at 9am. Pt notified and instructions given today.   Good # [phone]

## 2020-10-18 ENCOUNTER — HOSPITAL ENCOUNTER (OUTPATIENT)
Dept: OBGYN | Facility: MEDICAL CENTER | Age: 30
End: 2020-10-18
Attending: OBSTETRICS & GYNECOLOGY
Payer: COMMERCIAL

## 2020-10-18 LAB
COVID ORDER STATUS COVID19: NORMAL
SARS-COV-2 RDRP RESP QL NAA+PROBE: NOTDETECTED
SPECIMEN SOURCE: NORMAL

## 2020-10-18 PROCEDURE — U0004 COV-19 TEST NON-CDC HGH THRU: HCPCS

## 2020-10-18 PROCEDURE — C9803 HOPD COVID-19 SPEC COLLECT: HCPCS | Performed by: OBSTETRICS & GYNECOLOGY

## 2020-10-21 ENCOUNTER — ANESTHESIA (OUTPATIENT)
Dept: OBGYN | Facility: MEDICAL CENTER | Age: 30
End: 2020-10-21
Payer: COMMERCIAL

## 2020-10-21 ENCOUNTER — ANESTHESIA EVENT (OUTPATIENT)
Dept: OBGYN | Facility: MEDICAL CENTER | Age: 30
End: 2020-10-21
Payer: COMMERCIAL

## 2020-10-21 ENCOUNTER — APPOINTMENT (OUTPATIENT)
Dept: RADIOLOGY | Facility: MEDICAL CENTER | Age: 30
End: 2020-10-21
Attending: OBSTETRICS & GYNECOLOGY
Payer: COMMERCIAL

## 2020-10-21 ENCOUNTER — APPOINTMENT (OUTPATIENT)
Dept: OBGYN | Facility: MEDICAL CENTER | Age: 30
End: 2020-10-21
Attending: OBSTETRICS & GYNECOLOGY
Payer: COMMERCIAL

## 2020-10-21 ENCOUNTER — HOSPITAL ENCOUNTER (INPATIENT)
Facility: MEDICAL CENTER | Age: 30
LOS: 3 days | End: 2020-10-24
Attending: OBSTETRICS & GYNECOLOGY | Admitting: OBSTETRICS & GYNECOLOGY
Payer: COMMERCIAL

## 2020-10-21 LAB
BASOPHILS # BLD AUTO: 0.3 % (ref 0–1.8)
BASOPHILS # BLD: 0.03 K/UL (ref 0–0.12)
EOSINOPHIL # BLD AUTO: 0.2 K/UL (ref 0–0.51)
EOSINOPHIL NFR BLD: 1.8 % (ref 0–6.9)
ERYTHROCYTE [DISTWIDTH] IN BLOOD BY AUTOMATED COUNT: 44.2 FL (ref 35.9–50)
HCT VFR BLD AUTO: 35.2 % (ref 37–47)
HGB BLD-MCNC: 11.8 G/DL (ref 12–16)
HOLDING TUBE BB 8507: NORMAL
IMM GRANULOCYTES # BLD AUTO: 0.19 K/UL (ref 0–0.11)
IMM GRANULOCYTES NFR BLD AUTO: 1.7 % (ref 0–0.9)
LYMPHOCYTES # BLD AUTO: 1.42 K/UL (ref 1–4.8)
LYMPHOCYTES NFR BLD: 12.8 % (ref 22–41)
MCH RBC QN AUTO: 29.4 PG (ref 27–33)
MCHC RBC AUTO-ENTMCNC: 33.5 G/DL (ref 33.6–35)
MCV RBC AUTO: 87.6 FL (ref 81.4–97.8)
MONOCYTES # BLD AUTO: 0.6 K/UL (ref 0–0.85)
MONOCYTES NFR BLD AUTO: 5.4 % (ref 0–13.4)
NEUTROPHILS # BLD AUTO: 8.68 K/UL (ref 2–7.15)
NEUTROPHILS NFR BLD: 78 % (ref 44–72)
NRBC # BLD AUTO: 0 K/UL
NRBC BLD-RTO: 0 /100 WBC
PLATELET # BLD AUTO: 199 K/UL (ref 164–446)
PMV BLD AUTO: 10.6 FL (ref 9–12.9)
RBC # BLD AUTO: 4.02 M/UL (ref 4.2–5.4)
WBC # BLD AUTO: 11.1 K/UL (ref 4.8–10.8)

## 2020-10-21 PROCEDURE — 160048 HCHG OR STATISTICAL LEVEL 1-5: Performed by: OBSTETRICS & GYNECOLOGY

## 2020-10-21 PROCEDURE — 59510 CESAREAN DELIVERY: CPT | Performed by: OBSTETRICS & GYNECOLOGY

## 2020-10-21 PROCEDURE — 36415 COLL VENOUS BLD VENIPUNCTURE: CPT

## 2020-10-21 PROCEDURE — A9270 NON-COVERED ITEM OR SERVICE: HCPCS | Performed by: ANESTHESIOLOGY

## 2020-10-21 PROCEDURE — 160029 HCHG SURGERY MINUTES - 1ST 30 MINS LEVEL 4: Performed by: OBSTETRICS & GYNECOLOGY

## 2020-10-21 PROCEDURE — 700101 HCHG RX REV CODE 250: Performed by: ANESTHESIOLOGY

## 2020-10-21 PROCEDURE — 700111 HCHG RX REV CODE 636 W/ 250 OVERRIDE (IP)

## 2020-10-21 PROCEDURE — 770002 HCHG ROOM/CARE - OB PRIVATE (112)

## 2020-10-21 PROCEDURE — 700105 HCHG RX REV CODE 258: Performed by: ANESTHESIOLOGY

## 2020-10-21 PROCEDURE — 160002 HCHG RECOVERY MINUTES (STAT): Performed by: OBSTETRICS & GYNECOLOGY

## 2020-10-21 PROCEDURE — 160009 HCHG ANES TIME/MIN: Performed by: OBSTETRICS & GYNECOLOGY

## 2020-10-21 PROCEDURE — 160041 HCHG SURGERY MINUTES - EA ADDL 1 MIN LEVEL 4: Performed by: OBSTETRICS & GYNECOLOGY

## 2020-10-21 PROCEDURE — 85025 COMPLETE CBC W/AUTO DIFF WBC: CPT

## 2020-10-21 PROCEDURE — 700111 HCHG RX REV CODE 636 W/ 250 OVERRIDE (IP): Performed by: ANESTHESIOLOGY

## 2020-10-21 PROCEDURE — 700102 HCHG RX REV CODE 250 W/ 637 OVERRIDE(OP): Performed by: ANESTHESIOLOGY

## 2020-10-21 PROCEDURE — 500002 HCHG ADHESIVE, DERMABOND: Performed by: OBSTETRICS & GYNECOLOGY

## 2020-10-21 PROCEDURE — 160035 HCHG PACU - 1ST 60 MINS PHASE I: Performed by: OBSTETRICS & GYNECOLOGY

## 2020-10-21 RX ORDER — ONDANSETRON 2 MG/ML
INJECTION INTRAMUSCULAR; INTRAVENOUS PRN
Status: DISCONTINUED | OUTPATIENT
Start: 2020-10-21 | End: 2020-10-21 | Stop reason: SURG

## 2020-10-21 RX ORDER — KETOROLAC TROMETHAMINE 30 MG/ML
30 INJECTION, SOLUTION INTRAMUSCULAR; INTRAVENOUS EVERY 6 HOURS
Status: COMPLETED | OUTPATIENT
Start: 2020-10-22 | End: 2020-10-22

## 2020-10-21 RX ORDER — HYDROMORPHONE HYDROCHLORIDE 1 MG/ML
0.1 INJECTION, SOLUTION INTRAMUSCULAR; INTRAVENOUS; SUBCUTANEOUS
Status: DISCONTINUED | OUTPATIENT
Start: 2020-10-21 | End: 2020-10-21 | Stop reason: HOSPADM

## 2020-10-21 RX ORDER — HALOPERIDOL 5 MG/ML
1 INJECTION INTRAMUSCULAR
Status: DISCONTINUED | OUTPATIENT
Start: 2020-10-21 | End: 2020-10-21 | Stop reason: HOSPADM

## 2020-10-21 RX ORDER — ONDANSETRON 2 MG/ML
4 INJECTION INTRAMUSCULAR; INTRAVENOUS EVERY 6 HOURS PRN
Status: ACTIVE | OUTPATIENT
Start: 2020-10-21 | End: 2020-10-22

## 2020-10-21 RX ORDER — MORPHINE SULFATE 0.5 MG/ML
INJECTION, SOLUTION EPIDURAL; INTRATHECAL; INTRAVENOUS PRN
Status: DISCONTINUED | OUTPATIENT
Start: 2020-10-21 | End: 2020-10-21 | Stop reason: SURG

## 2020-10-21 RX ORDER — HYDROMORPHONE HYDROCHLORIDE 1 MG/ML
0.2 INJECTION, SOLUTION INTRAMUSCULAR; INTRAVENOUS; SUBCUTANEOUS
Status: ACTIVE | OUTPATIENT
Start: 2020-10-21 | End: 2020-10-22

## 2020-10-21 RX ORDER — DIPHENHYDRAMINE HYDROCHLORIDE 50 MG/ML
25 INJECTION INTRAMUSCULAR; INTRAVENOUS EVERY 6 HOURS PRN
Status: CANCELLED | OUTPATIENT
Start: 2020-10-21

## 2020-10-21 RX ORDER — SODIUM CHLORIDE, SODIUM GLUCONATE, SODIUM ACETATE, POTASSIUM CHLORIDE AND MAGNESIUM CHLORIDE 526; 502; 368; 37; 30 MG/100ML; MG/100ML; MG/100ML; MG/100ML; MG/100ML
INJECTION, SOLUTION INTRAVENOUS
Status: DISCONTINUED | OUTPATIENT
Start: 2020-10-21 | End: 2020-10-21 | Stop reason: SURG

## 2020-10-21 RX ORDER — DEXAMETHASONE SODIUM PHOSPHATE 4 MG/ML
INJECTION, SOLUTION INTRA-ARTICULAR; INTRALESIONAL; INTRAMUSCULAR; INTRAVENOUS; SOFT TISSUE PRN
Status: DISCONTINUED | OUTPATIENT
Start: 2020-10-21 | End: 2020-10-21 | Stop reason: SURG

## 2020-10-21 RX ORDER — HYDROMORPHONE HYDROCHLORIDE 1 MG/ML
0.4 INJECTION, SOLUTION INTRAMUSCULAR; INTRAVENOUS; SUBCUTANEOUS
Status: ACTIVE | OUTPATIENT
Start: 2020-10-21 | End: 2020-10-22

## 2020-10-21 RX ORDER — HYDROMORPHONE HYDROCHLORIDE 1 MG/ML
0.4 INJECTION, SOLUTION INTRAMUSCULAR; INTRAVENOUS; SUBCUTANEOUS
Status: DISCONTINUED | OUTPATIENT
Start: 2020-10-21 | End: 2020-10-21 | Stop reason: HOSPADM

## 2020-10-21 RX ORDER — DIPHENHYDRAMINE HYDROCHLORIDE 50 MG/ML
25 INJECTION INTRAMUSCULAR; INTRAVENOUS EVERY 6 HOURS PRN
Status: ACTIVE | OUTPATIENT
Start: 2020-10-21 | End: 2020-10-22

## 2020-10-21 RX ORDER — MISOPROSTOL 200 UG/1
800 TABLET ORAL
Status: DISCONTINUED | OUTPATIENT
Start: 2020-10-21 | End: 2020-10-24 | Stop reason: HOSPADM

## 2020-10-21 RX ORDER — METOCLOPRAMIDE HYDROCHLORIDE 5 MG/ML
10 INJECTION INTRAMUSCULAR; INTRAVENOUS ONCE
Status: COMPLETED | OUTPATIENT
Start: 2020-10-21 | End: 2020-10-21

## 2020-10-21 RX ORDER — DOCUSATE SODIUM 100 MG/1
100 CAPSULE, LIQUID FILLED ORAL 2 TIMES DAILY PRN
Status: DISCONTINUED | OUTPATIENT
Start: 2020-10-21 | End: 2020-10-24 | Stop reason: HOSPADM

## 2020-10-21 RX ORDER — SODIUM CHLORIDE, SODIUM LACTATE, POTASSIUM CHLORIDE, CALCIUM CHLORIDE 600; 310; 30; 20 MG/100ML; MG/100ML; MG/100ML; MG/100ML
INJECTION, SOLUTION INTRAVENOUS PRN
Status: DISCONTINUED | OUTPATIENT
Start: 2020-10-21 | End: 2020-10-24 | Stop reason: HOSPADM

## 2020-10-21 RX ORDER — OXYCODONE HCL 5 MG/5 ML
10 SOLUTION, ORAL ORAL
Status: DISCONTINUED | OUTPATIENT
Start: 2020-10-21 | End: 2020-10-21 | Stop reason: HOSPADM

## 2020-10-21 RX ORDER — BUPIVACAINE HYDROCHLORIDE 7.5 MG/ML
INJECTION, SOLUTION INTRASPINAL
Status: COMPLETED | OUTPATIENT
Start: 2020-10-21 | End: 2020-10-21

## 2020-10-21 RX ORDER — OXYTOCIN 10 [USP'U]/ML
INJECTION, SOLUTION INTRAMUSCULAR; INTRAVENOUS PRN
Status: DISCONTINUED | OUTPATIENT
Start: 2020-10-21 | End: 2020-10-21 | Stop reason: SURG

## 2020-10-21 RX ORDER — SODIUM CHLORIDE, SODIUM GLUCONATE, SODIUM ACETATE, POTASSIUM CHLORIDE AND MAGNESIUM CHLORIDE 526; 502; 368; 37; 30 MG/100ML; MG/100ML; MG/100ML; MG/100ML; MG/100ML
500 INJECTION, SOLUTION INTRAVENOUS ONCE
Status: DISCONTINUED | OUTPATIENT
Start: 2020-10-21 | End: 2020-10-21 | Stop reason: HOSPADM

## 2020-10-21 RX ORDER — ACETAMINOPHEN 500 MG
1000 TABLET ORAL EVERY 6 HOURS
Status: COMPLETED | OUTPATIENT
Start: 2020-10-22 | End: 2020-10-22

## 2020-10-21 RX ORDER — DIPHENHYDRAMINE HYDROCHLORIDE 50 MG/ML
12.5 INJECTION INTRAMUSCULAR; INTRAVENOUS
Status: DISCONTINUED | OUTPATIENT
Start: 2020-10-21 | End: 2020-10-21 | Stop reason: HOSPADM

## 2020-10-21 RX ORDER — OXYCODONE HYDROCHLORIDE 10 MG/1
10 TABLET ORAL EVERY 4 HOURS PRN
Status: DISCONTINUED | OUTPATIENT
Start: 2020-10-21 | End: 2020-10-22

## 2020-10-21 RX ORDER — KETOROLAC TROMETHAMINE 30 MG/ML
30 INJECTION, SOLUTION INTRAMUSCULAR; INTRAVENOUS EVERY 6 HOURS
Status: CANCELLED | OUTPATIENT
Start: 2020-10-22 | End: 2020-10-22

## 2020-10-21 RX ORDER — SODIUM CHLORIDE, SODIUM LACTATE, POTASSIUM CHLORIDE, CALCIUM CHLORIDE 600; 310; 30; 20 MG/100ML; MG/100ML; MG/100ML; MG/100ML
INJECTION, SOLUTION INTRAVENOUS CONTINUOUS
Status: DISCONTINUED | OUTPATIENT
Start: 2020-10-21 | End: 2020-10-24 | Stop reason: HOSPADM

## 2020-10-21 RX ORDER — SCOLOPAMINE TRANSDERMAL SYSTEM 1 MG/1
PATCH, EXTENDED RELEASE TRANSDERMAL PRN
Status: DISCONTINUED | OUTPATIENT
Start: 2020-10-21 | End: 2020-10-21 | Stop reason: SURG

## 2020-10-21 RX ORDER — VITAMIN A ACETATE, BETA CAROTENE, ASCORBIC ACID, CHOLECALCIFEROL, .ALPHA.-TOCOPHEROL ACETATE, DL-, THIAMINE MONONITRATE, RIBOFLAVIN, NIACINAMIDE, PYRIDOXINE HYDROCHLORIDE, FOLIC ACID, CYANOCOBALAMIN, CALCIUM CARBONATE, FERROUS FUMARATE, ZINC OXIDE, CUPRIC OXIDE 3080; 12; 120; 400; 1; 1.84; 3; 20; 22; 920; 25; 200; 27; 10; 2 [IU]/1; UG/1; MG/1; [IU]/1; MG/1; MG/1; MG/1; MG/1; MG/1; [IU]/1; MG/1; MG/1; MG/1; MG/1; MG/1
1 TABLET, FILM COATED ORAL
Status: DISCONTINUED | OUTPATIENT
Start: 2020-10-22 | End: 2020-10-24 | Stop reason: HOSPADM

## 2020-10-21 RX ORDER — DIPHENHYDRAMINE HYDROCHLORIDE 50 MG/ML
12.5 INJECTION INTRAMUSCULAR; INTRAVENOUS EVERY 6 HOURS PRN
Status: ACTIVE | OUTPATIENT
Start: 2020-10-21 | End: 2020-10-22

## 2020-10-21 RX ORDER — MEPERIDINE HYDROCHLORIDE 25 MG/ML
12.5 INJECTION INTRAMUSCULAR; INTRAVENOUS; SUBCUTANEOUS
Status: DISCONTINUED | OUTPATIENT
Start: 2020-10-21 | End: 2020-10-21 | Stop reason: HOSPADM

## 2020-10-21 RX ORDER — CITRIC ACID/SODIUM CITRATE 334-500MG
30 SOLUTION, ORAL ORAL ONCE
Status: COMPLETED | OUTPATIENT
Start: 2020-10-21 | End: 2020-10-21

## 2020-10-21 RX ORDER — DIPHENHYDRAMINE HCL 25 MG
25 TABLET ORAL EVERY 6 HOURS PRN
Status: CANCELLED | OUTPATIENT
Start: 2020-10-21

## 2020-10-21 RX ORDER — SODIUM CHLORIDE, SODIUM GLUCONATE, SODIUM ACETATE, POTASSIUM CHLORIDE AND MAGNESIUM CHLORIDE 526; 502; 368; 37; 30 MG/100ML; MG/100ML; MG/100ML; MG/100ML; MG/100ML
1500 INJECTION, SOLUTION INTRAVENOUS ONCE
Status: COMPLETED | OUTPATIENT
Start: 2020-10-21 | End: 2020-10-21

## 2020-10-21 RX ORDER — OXYCODONE HCL 5 MG/5 ML
5 SOLUTION, ORAL ORAL
Status: DISCONTINUED | OUTPATIENT
Start: 2020-10-21 | End: 2020-10-21 | Stop reason: HOSPADM

## 2020-10-21 RX ORDER — CEFAZOLIN SODIUM 1 G/3ML
INJECTION, POWDER, FOR SOLUTION INTRAMUSCULAR; INTRAVENOUS PRN
Status: DISCONTINUED | OUTPATIENT
Start: 2020-10-21 | End: 2020-10-21 | Stop reason: SURG

## 2020-10-21 RX ORDER — SIMETHICONE 80 MG
80 TABLET,CHEWABLE ORAL 4 TIMES DAILY PRN
Status: DISCONTINUED | OUTPATIENT
Start: 2020-10-21 | End: 2020-10-24 | Stop reason: HOSPADM

## 2020-10-21 RX ORDER — OXYCODONE HYDROCHLORIDE 5 MG/1
5 TABLET ORAL EVERY 4 HOURS PRN
Status: DISCONTINUED | OUTPATIENT
Start: 2020-10-21 | End: 2020-10-22

## 2020-10-21 RX ORDER — HYDROMORPHONE HYDROCHLORIDE 1 MG/ML
0.2 INJECTION, SOLUTION INTRAMUSCULAR; INTRAVENOUS; SUBCUTANEOUS
Status: DISCONTINUED | OUTPATIENT
Start: 2020-10-21 | End: 2020-10-21 | Stop reason: HOSPADM

## 2020-10-21 RX ORDER — KETOROLAC TROMETHAMINE 30 MG/ML
INJECTION, SOLUTION INTRAMUSCULAR; INTRAVENOUS PRN
Status: DISCONTINUED | OUTPATIENT
Start: 2020-10-21 | End: 2020-10-21 | Stop reason: SURG

## 2020-10-21 RX ORDER — ONDANSETRON 2 MG/ML
4 INJECTION INTRAMUSCULAR; INTRAVENOUS
Status: DISCONTINUED | OUTPATIENT
Start: 2020-10-21 | End: 2020-10-21 | Stop reason: HOSPADM

## 2020-10-21 RX ORDER — BISACODYL 10 MG
10 SUPPOSITORY, RECTAL RECTAL PRN
Status: DISCONTINUED | OUTPATIENT
Start: 2020-10-21 | End: 2020-10-24 | Stop reason: HOSPADM

## 2020-10-21 RX ADMIN — BUPIVACAINE HYDROCHLORIDE IN DEXTROSE 1.6 ML: 7.5 INJECTION, SOLUTION SUBARACHNOID at 19:35

## 2020-10-21 RX ADMIN — ONDANSETRON 4 MG: 2 INJECTION INTRAMUSCULAR; INTRAVENOUS at 19:40

## 2020-10-21 RX ADMIN — CEFAZOLIN 2 G: 330 INJECTION, POWDER, FOR SOLUTION INTRAMUSCULAR; INTRAVENOUS at 19:35

## 2020-10-21 RX ADMIN — SODIUM CHLORIDE, SODIUM GLUCONATE, SODIUM ACETATE, POTASSIUM CHLORIDE AND MAGNESIUM CHLORIDE 1500 ML: 526; 502; 368; 37; 30 INJECTION, SOLUTION INTRAVENOUS at 17:37

## 2020-10-21 RX ADMIN — OXYTOCIN 20 UNITS: 10 INJECTION, SOLUTION INTRAMUSCULAR; INTRAVENOUS at 19:55

## 2020-10-21 RX ADMIN — FENTANYL CITRATE 15 MCG: 50 INJECTION, SOLUTION INTRAMUSCULAR; INTRAVENOUS at 19:35

## 2020-10-21 RX ADMIN — SODIUM CHLORIDE, SODIUM GLUCONATE, SODIUM ACETATE, POTASSIUM CHLORIDE AND MAGNESIUM CHLORIDE: 526; 502; 368; 37; 30 INJECTION, SOLUTION INTRAVENOUS at 20:09

## 2020-10-21 RX ADMIN — DEXAMETHASONE SODIUM PHOSPHATE 4 MG: 4 INJECTION, SOLUTION INTRA-ARTICULAR; INTRALESIONAL; INTRAMUSCULAR; INTRAVENOUS; SOFT TISSUE at 19:40

## 2020-10-21 RX ADMIN — METOCLOPRAMIDE 10 MG: 5 INJECTION, SOLUTION INTRAMUSCULAR; INTRAVENOUS at 18:55

## 2020-10-21 RX ADMIN — FAMOTIDINE 20 MG: 10 INJECTION INTRAVENOUS at 18:55

## 2020-10-21 RX ADMIN — MORPHINE SULFATE 0.1 MG: 0.5 INJECTION, SOLUTION EPIDURAL; INTRATHECAL; INTRAVENOUS at 19:35

## 2020-10-21 RX ADMIN — PHENYLEPHRINE HYDROCHLORIDE 40 MCG/MIN: 10 INJECTION INTRAVENOUS at 19:35

## 2020-10-21 RX ADMIN — SCOPALAMINE 1 PATCH: 1 PATCH, EXTENDED RELEASE TRANSDERMAL at 19:35

## 2020-10-21 RX ADMIN — SODIUM CITRATE AND CITRIC ACID MONOHYDRATE 30 ML: 500; 334 SOLUTION ORAL at 18:55

## 2020-10-21 RX ADMIN — OXYTOCIN 125 ML/HR: 10 INJECTION, SOLUTION INTRAMUSCULAR; INTRAVENOUS at 20:51

## 2020-10-21 RX ADMIN — KETOROLAC TROMETHAMINE 15 MG: 30 INJECTION, SOLUTION INTRAMUSCULAR at 20:10

## 2020-10-21 RX ADMIN — SODIUM CHLORIDE, SODIUM GLUCONATE, SODIUM ACETATE, POTASSIUM CHLORIDE AND MAGNESIUM CHLORIDE: 526; 502; 368; 37; 30 INJECTION, SOLUTION INTRAVENOUS at 19:24

## 2020-10-21 ASSESSMENT — EDINBURGH POSTNATAL DEPRESSION SCALE (EPDS)
I HAVE BEEN ABLE TO LAUGH AND SEE THE FUNNY SIDE OF THINGS: AS MUCH AS I ALWAYS COULD
I HAVE FELT SAD OR MISERABLE: NOT VERY OFTEN
I HAVE FELT SCARED OR PANICKY FOR NO GOOD REASON: NO, NOT MUCH
I HAVE BEEN SO UNHAPPY THAT I HAVE HAD DIFFICULTY SLEEPING: NOT AT ALL
THINGS HAVE BEEN GETTING ON TOP OF ME: NO, MOST OF THE TIME I HAVE COPED QUITE WELL
I HAVE BEEN SO UNHAPPY THAT I HAVE BEEN CRYING: NO, NEVER
I HAVE BEEN ANXIOUS OR WORRIED FOR NO GOOD REASON: HARDLY EVER
I HAVE LOOKED FORWARD WITH ENJOYMENT TO THINGS: AS MUCH AS I EVER DID
THE THOUGHT OF HARMING MYSELF HAS OCCURRED TO ME: NEVER
I HAVE BLAMED MYSELF UNNECESSARILY WHEN THINGS WENT WRONG: NO, NEVER

## 2020-10-21 ASSESSMENT — LIFESTYLE VARIABLES: EVER_SMOKED: NEVER

## 2020-10-21 ASSESSMENT — PATIENT HEALTH QUESTIONNAIRE - PHQ9
SUM OF ALL RESPONSES TO PHQ9 QUESTIONS 1 AND 2: 0
1. LITTLE INTEREST OR PLEASURE IN DOING THINGS: NOT AT ALL
2. FEELING DOWN, DEPRESSED, IRRITABLE, OR HOPELESS: NOT AT ALL

## 2020-10-21 ASSESSMENT — PAIN SCALES - GENERAL
PAINLEVEL: 0 - NO PAIN
PAIN_LEVEL: 0

## 2020-10-21 NOTE — PROGRESS NOTES
Pt presents to L&D for scheduled IOL. Pt ambulated to S220 for admission.     1445 TOCO and EFM applied, VSS. Pt reports +FM, denies LOF or VB. Pt states she has not felt any contractions but has had a small amount of cramping.     1450 SINDI Morales at bedside, POC discussed. Options related to induction discussed. Pt to speak with FOB and make a decision.     1515 RN, SINDI Morales and Dr. Bull at bedside, POC discussed. Pt would like to continue to discuss with FOB.     1540 RN at bedside, pt has decided to proceed with a C/S. Dr. Bull updated on pt status.     1552  updated on canceled order    1900 Waiting for Dr. Bull to okay pt transfer to OR    1920 Okay to walk pt back to OR. Pt ambulated to OR 1 in stable condition.     2034 Pt stable and transferred to PACU via gurney    2100 Report given to Elvira POPE, POC discussed.

## 2020-10-21 NOTE — H&P
OB H&P:    CC: planned IOL    HPI:  Ms. Kyara Jones is a 29 y.o.  @ 39w5d by 8wk US with hx of c/s x1 here for scheduled IOL.  Patient previously counseled in the office and was interested in trial of labor after .  Her last  was performed at term for arrest of latent labor, never got past 4 cm.        Contractions: No   Loss of fluid: No   Vaginal bleeding: No   Fetal movement: Present      PNC with [facility], denies complications.  Patient did test positive for Covid at the beginning of September at work, was asymptomatic at that time.  Covid negative 10/18.          PNL:  Rh +/-, RI, HIV neg, RPR NR, HBsAg NR, GC/CT neg/neg  QS neg; 1hr 134  GBS neg      ROS:  Const: denies fevers, general concerns  CV/resp: reports no concerns  GI: denies abd pain, GI concerns  : see HPI  Neuro: reports HA/vision changes    OB History    Para Term  AB Living   3 1 1 0 1 1   SAB TAB Ectopic Molar Multiple Live Births   1 0 0   0 1      # Outcome Date GA Lbr Case/2nd Weight Sex Delivery Anes PTL Lv   3 Current            2 Term 17 39w1d  3.065 kg (6 lb 12.1 oz) M CS-LTranv Spinal  SRINI      Birth Comments: Primary C/S due to FTP   1 SAB 2016 8w0d             Birth Comments: D&C        GYN: denies STIs, no abnl paps       Past Medical History:   Diagnosis Date   • History of C/S x1 - desires TOLAC, considering BTL if C/S needed 2020    Plans for TOLAC. Does not desire BTL anymore.       Past Surgical History:   Procedure Laterality Date   • PRIMARY C SECTION  2017    Procedure: PRIMARY C SECTION;  Surgeon: Keke Huntley M.D.;  Location: LABOR AND DELIVERY;  Service:    • DILATION AND EVACUATION N/A 2016    Procedure: DILATION AND EVACUATION;  Surgeon: Joe Ramirez M.D.;  Location: SURGERY SAME DAY [facility] ORS;  Service:        No current facility-administered medications on file prior to encounter.      Current Outpatient Medications on File Prior  "to Encounter   Medication Sig Dispense Refill   • Prenatal MV-Min-Fe Fum-FA-DHA (PRENATAL 1 PO) Take  by mouth.         Family History   Problem Relation Age of Onset   • Arthritis Paternal Grandmother    • Hypertension Paternal Grandmother        Social History     Tobacco Use   • Smoking status: Former Smoker     Packs/day: 0.25     Years: 4.00     Pack years: 1.00     Types: Cigarettes     Quit date: 2017     Years since quitting: 3.5   • Smokeless tobacco: Never Used   Substance Use Topics   • Alcohol use: No     Comment: occ   • Drug use: No       PE:  Vitals:    10/21/20 1446   BP: 112/71   Pulse: 90   Temp: 36.6 °C (97.8 °F)   TempSrc: Temporal   Weight: 99.8 kg (220 lb)   Height: 1.676 m (5' 6\")     gen: AAO, NAD      SVE: //h per CNM  FHT: 115/mod variability/+ accels/ no decels  erika:    A/P: 29 y.o.  @ 39w5d by 8wk US with hx of c/s x1  Today, I discussed with Kyara Charmaine Jones her history of  delivery.  We discussed the risks/benefits of TOLAC, including uterine rupture (approximately 0.7%) with need for emergent  delivery, risk of maternal hemorrhage requiring blood transfusion or, rarely, hysterectomy, risk of fetal death or permanent hypoxic brain injury) vs repeat  delivery including surgical risks (damage to intraabdominal structures, bleeding, infection, pain), possible need for  delivery in future pregnancies, and additional pregnancy complications.  We discussed that if she is desiring a vaginal delivery, I recommend we not proceed with delivery today and she go home given that her chance of successful  increases if she goes into spontaneous labor and we do not have a strong indication for delivery today.    After our discussion, patient prefers to have repeat  today.    I discussed w/ pt risks of surgery including pain, bleeding, infection, risk of damage to intraabdominal structures including bowel/bladder/ureters.  Pt " confirms she is accepting of blood transfusion in case of emergency.  Reviewed risks of transfusion including transfusion reaction (fever, damage to heart/lungs/kidneys), risk of exposure to infectious disease including HIV and hepatitis.  All questions answered.           Rashmi Coles MD  [facility], Women's Health

## 2020-10-22 LAB
ERYTHROCYTE [DISTWIDTH] IN BLOOD BY AUTOMATED COUNT: 44.9 FL (ref 35.9–50)
HCT VFR BLD AUTO: 34 % (ref 37–47)
HGB BLD-MCNC: 11.2 G/DL (ref 12–16)
MCH RBC QN AUTO: 30 PG (ref 27–33)
MCHC RBC AUTO-ENTMCNC: 32.9 G/DL (ref 33.6–35)
MCV RBC AUTO: 91.2 FL (ref 81.4–97.8)
PLATELET # BLD AUTO: 172 K/UL (ref 164–446)
PMV BLD AUTO: 10.6 FL (ref 9–12.9)
RBC # BLD AUTO: 3.73 M/UL (ref 4.2–5.4)
WBC # BLD AUTO: 18.5 K/UL (ref 4.8–10.8)

## 2020-10-22 PROCEDURE — A9270 NON-COVERED ITEM OR SERVICE: HCPCS | Performed by: OBSTETRICS & GYNECOLOGY

## 2020-10-22 PROCEDURE — 85027 COMPLETE CBC AUTOMATED: CPT

## 2020-10-22 PROCEDURE — 700111 HCHG RX REV CODE 636 W/ 250 OVERRIDE (IP): Performed by: ANESTHESIOLOGY

## 2020-10-22 PROCEDURE — 36415 COLL VENOUS BLD VENIPUNCTURE: CPT

## 2020-10-22 PROCEDURE — 700102 HCHG RX REV CODE 250 W/ 637 OVERRIDE(OP): Performed by: ANESTHESIOLOGY

## 2020-10-22 PROCEDURE — 700102 HCHG RX REV CODE 250 W/ 637 OVERRIDE(OP): Performed by: OBSTETRICS & GYNECOLOGY

## 2020-10-22 PROCEDURE — A9270 NON-COVERED ITEM OR SERVICE: HCPCS | Performed by: ANESTHESIOLOGY

## 2020-10-22 PROCEDURE — 770002 HCHG ROOM/CARE - OB PRIVATE (112)

## 2020-10-22 RX ORDER — OXYCODONE HYDROCHLORIDE 5 MG/1
5 TABLET ORAL EVERY 4 HOURS PRN
Status: DISCONTINUED | OUTPATIENT
Start: 2020-10-22 | End: 2020-10-24 | Stop reason: HOSPADM

## 2020-10-22 RX ORDER — ACETAMINOPHEN 500 MG
1000 TABLET ORAL EVERY 6 HOURS
Status: DISCONTINUED | OUTPATIENT
Start: 2020-10-23 | End: 2020-10-24 | Stop reason: HOSPADM

## 2020-10-22 RX ORDER — OXYCODONE HYDROCHLORIDE 10 MG/1
10 TABLET ORAL EVERY 4 HOURS PRN
Status: DISCONTINUED | OUTPATIENT
Start: 2020-10-22 | End: 2020-10-24 | Stop reason: HOSPADM

## 2020-10-22 RX ORDER — IBUPROFEN 800 MG/1
800 TABLET ORAL 3 TIMES DAILY
Status: DISCONTINUED | OUTPATIENT
Start: 2020-10-22 | End: 2020-10-24 | Stop reason: HOSPADM

## 2020-10-22 RX ADMIN — ACETAMINOPHEN 1000 MG: 500 TABLET ORAL at 19:55

## 2020-10-22 RX ADMIN — ACETAMINOPHEN 1000 MG: 500 TABLET ORAL at 08:55

## 2020-10-22 RX ADMIN — PRENATAL WITH FERROUS FUM AND FOLIC ACID 1 TABLET: 3080; 920; 120; 400; 22; 1.84; 3; 20; 10; 1; 12; 200; 27; 25; 2 TABLET ORAL at 08:55

## 2020-10-22 RX ADMIN — KETOROLAC TROMETHAMINE 30 MG: 30 INJECTION, SOLUTION INTRAMUSCULAR; INTRAVENOUS at 08:55

## 2020-10-22 RX ADMIN — ACETAMINOPHEN 1000 MG: 500 TABLET ORAL at 01:49

## 2020-10-22 RX ADMIN — KETOROLAC TROMETHAMINE 30 MG: 30 INJECTION, SOLUTION INTRAMUSCULAR; INTRAVENOUS at 01:49

## 2020-10-22 RX ADMIN — KETOROLAC TROMETHAMINE 30 MG: 30 INJECTION, SOLUTION INTRAMUSCULAR; INTRAVENOUS at 14:35

## 2020-10-22 RX ADMIN — KETOROLAC TROMETHAMINE 30 MG: 30 INJECTION, SOLUTION INTRAMUSCULAR; INTRAVENOUS at 19:55

## 2020-10-22 RX ADMIN — ACETAMINOPHEN 1000 MG: 500 TABLET ORAL at 14:35

## 2020-10-22 ASSESSMENT — PAIN DESCRIPTION - PAIN TYPE: TYPE: ACUTE PAIN

## 2020-10-22 NOTE — LACTATION NOTE
This note was copied from a baby's chart.  Mom is a 30 y/o P2 who delivered baby boy weighing 8# 13.1 oz at 39.5 wks. Mom breast fed her first child for 6 months without difficulty but returned to work and noticed a decrease in supply and wasn't able to increase with breast pump.   Mom reports darker and enlarged areolas during pregnancy. Mom denies any breast surgeries, diabetes, thyroid or fertility issues.  Mom states this baby is latching well on both sides and is offering both breast at every feed. Mom can hand express colostrum easily and  gave information for [employer] hand expression video and encouraged mom to watch. Mom has no concerns at this time and it was recommended that she call for assist if needed with feeds.   Baby has had one void and 4 stools since birth and father reports this stool was easier to wipe but still remains dark in color. Reviewed normal color changes to stools over next several days,as well as lactogenesis. Encouraged demand feeds.  Follow up in a.m before discharge home to reevaluate feeding plan.

## 2020-10-22 NOTE — PROGRESS NOTES
Pt. Admitted to floor by BEBE Felix&ETTA POPE. Pt. Assessed at this time. Vital signs WDL. Fundus firm, lochia light. Pt. Educated on infant safety and use of call light add when to call for assistance. Pt. Oriented to room. Will continue to monitor.

## 2020-10-22 NOTE — NON-PROVIDER
"Kyara Jones G3 now  POD 1 s/p elective repeat  at 39w5d by 8wk US.     No complications from . Patient is currently not experiencing any pain, just some slight discomfort in the lower abdomen near her incision site. Had one dose of Tylenol and one dose of Toradol at 0100. No nausea or vomiting. She is not ambulating yet. She is tolerating liquids. No flatus or BM since surgery. Mild vaginal bleeding. Herbert cath still in place and having good output. Patient is breastfeeding, baby had no problems latching with first attempt around 0400 but baby needed donor milk supplementation. No breast tenderness or engorgement. Has been thinking about Depo shot for birth control postpartum.       BP (!) 96/64   Pulse 62   Temp 36 °C (96.8 °F) (Temporal)   Resp 16   Ht 1.676 m (5' 6\")   Wt 99.8 kg (220 lb)   SpO2 96%   Breast tenderness no, Engorgement no, Mastitis no  CVS: RRR  Resp: CTA bilaterally  Abdomen: Abdomen soft, non-tender. BS normal. No organomegaly  Incision: dressing in place and not removed for exam. Dressing is clean and dry  Fundus: non-tender, below umbilicus  Extremities: Normal    Meds:   No current facility-administered medications on file prior to encounter.      Current Outpatient Medications on File Prior to Encounter   Medication Sig Dispense Refill   • Prenatal MV-Min-Fe Fum-FA-DHA (PRENATAL 1 PO) Take  by mouth.         Lab:   Recent Results (from the past 48 hour(s))   Hold Blood Bank Specimen (Not Tested)    Collection Time: 10/21/20  4:28 PM   Result Value Ref Range    Holding Tube - Bb DONE    CBC with Differential    Collection Time: 10/21/20  4:28 PM   Result Value Ref Range    WBC 11.1 (H) 4.8 - 10.8 K/uL    RBC 4.02 (L) 4.20 - 5.40 M/uL    Hemoglobin 11.8 (L) 12.0 - 16.0 g/dL    Hematocrit 35.2 (L) 37.0 - 47.0 %    MCV 87.6 81.4 - 97.8 fL    MCH 29.4 27.0 - 33.0 pg    MCHC 33.5 (L) 33.6 - 35.0 g/dL    RDW 44.2 35.9 - 50.0 fL    Platelet Count 199 164 " - 446 K/uL    MPV 10.6 9.0 - 12.9 fL    Neutrophils-Polys 78.00 (H) 44.00 - 72.00 %    Lymphocytes 12.80 (L) 22.00 - 41.00 %    Monocytes 5.40 0.00 - 13.40 %    Eosinophils 1.80 0.00 - 6.90 %    Basophils 0.30 0.00 - 1.80 %    Immature Granulocytes 1.70 (H) 0.00 - 0.90 %    Nucleated RBC 0.00 /100 WBC    Neutrophils (Absolute) 8.68 (H) 2.00 - 7.15 K/uL    Lymphs (Absolute) 1.42 1.00 - 4.80 K/uL    Monos (Absolute) 0.60 0.00 - 0.85 K/uL    Eos (Absolute) 0.20 0.00 - 0.51 K/uL    Baso (Absolute) 0.03 0.00 - 0.12 K/uL    Immature Granulocytes (abs) 0.19 (H) 0.00 - 0.11 K/uL    NRBC (Absolute) 0.00 K/uL   CBC without differential    Collection Time: 10/22/20  4:04 AM   Result Value Ref Range    WBC 18.5 (H) 4.8 - 10.8 K/uL    RBC 3.73 (L) 4.20 - 5.40 M/uL    Hemoglobin 11.2 (L) 12.0 - 16.0 g/dL    Hematocrit 34.0 (L) 37.0 - 47.0 %    MCV 91.2 81.4 - 97.8 fL    MCH 30.0 27.0 - 33.0 pg    MCHC 32.9 (L) 33.6 - 35.0 g/dL    RDW 44.9 35.9 - 50.0 fL    Platelet Count 172 164 - 446 K/uL    MPV 10.6 9.0 - 12.9 fL       Assessment and Plan  30 y/o female POD#1 s/p repeat  at 39w5d. Delivered baby boyAbilio. Doing well postpartum. Currently having no pain, afebrile, physical exam wnl.     - Continue Routine postpartum care  - Remove kruse  - Ambulation encouraged  - Advance diet as tolerated  - Continue breastfeeding  - Scheduled Tylenol and Toradol for pain, dilaudid and oxycodone PRN severe breakthrough pain  - Zofran PRN nausea/vomiting  - Colace, dulcolax, milk of mag PRN constipation  - Plan to d/c in 1-2 days if continuing to do well postpartum

## 2020-10-22 NOTE — ANESTHESIA PROCEDURE NOTES
Spinal Block    Date/Time: 10/21/2020 7:35 PM  Performed by: Francesca Mayo M.D.  Authorized by: Francesca Mayo M.D.     Patient Location:  OR  Start Time:  10/21/2020 7:35 PM  Reason for Block: primary anesthetic    patient identified, IV checked, site marked, risks and benefits discussed, surgical consent, monitors and equipment checked, pre-op evaluation and timeout performed    Patient Position:  Sitting  Prep: ChloraPrep, patient draped and sterile technique    Monitoring:  Blood pressure, continuous pulse oximetry and heart rate  Approach:  Midline  Location:  L3-4  Injection Technique:  Single-shot  Skin infiltration:  Lidocaine  Strength:  1%  Dose:  3ml  Needle Type:  Pencan  Needle Gauge:  25 G  CSF flowing pre/post injection:  Yes  Sensory Level:  T4  Events: paresthesia-transient/resolved

## 2020-10-22 NOTE — ANESTHESIA POSTPROCEDURE EVALUATION
Patient: Kyara Jones    Procedure Summary     Date: 10/21/20 Room / Location: [facility] OR 01 / LABOR AND DELIVERY    Anesthesia Start:  Anesthesia Stop:     Procedure:  SECTION, REPEAT (Abdomen) Diagnosis: (Same, delivered)    Surgeon: Rashmi Coles M.D. Responsible Provider: Francesca Mayo M.D.    Anesthesia Type: spinal ASA Status: 2          Final Anesthesia Type: spinal  Last vitals  BP   Blood Pressure: 114/68    Temp   36.2 °C (97.1 °F)    Pulse   Pulse: 69   Resp   18    SpO2   97 %      Anesthesia Post Evaluation    Patient location during evaluation: PACU  Patient participation: complete - patient participated  Level of consciousness: awake and alert  Pain score: 0    Airway patency: patent  Anesthetic complications: no  Cardiovascular status: hemodynamically stable  Respiratory status: acceptable  Hydration status: euvolemic    PONV: none

## 2020-10-22 NOTE — ANESTHESIA PREPROCEDURE EVALUATION
"28yo  @39+5 here for repeat     No Known Allergies     Relevant Problems   ANESTHESIA   (-) History of anesthesia complications      PULMONARY (within normal limits)      CARDIAC (within normal limits)     No current facility-administered medications on file prior to encounter.      Current Outpatient Medications on File Prior to Encounter   Medication Sig Dispense Refill   • Prenatal MV-Min-Fe Fum-FA-DHA (PRENATAL 1 PO) Take  by mouth.        Past Surgical History:   Procedure Laterality Date   • PRIMARY C SECTION  2017    Procedure: PRIMARY C SECTION;  Surgeon: Keke Huntley M.D.;  Location: LABOR AND DELIVERY;  Service:    • DILATION AND EVACUATION N/A 2016    Procedure: DILATION AND EVACUATION;  Surgeon: Joe Ramirez M.D.;  Location: SURGERY SAME DAY [facility];  Service:       Vitals:    10/21/20 1446 10/21/20 1651   BP: 112/71    Pulse: 90    Resp:  18   Temp: 36.6 °C (97.8 °F)    TempSrc: Temporal    SpO2:  98%   Weight: 99.8 kg (220 lb)    Height: 1.676 m (5' 6\")      Physical Exam    Airway   Mallampati: III  TM distance: >3 FB  Neck ROM: full    Comments: Nose ring, no oral piercings     Cardiovascular - normal exam     Dental - normal exam           Pulmonary - normal exam     Abdominal    Neurological - normal exam                 Anesthesia Plan    ASA 2       Plan - spinal   Neuraxial block will be primary anesthetic      Plan Factors:   Patient was previously instructed to abstain from smoking on day of procedure.  Patient did not smoke on day of procedure.      Induction: intravenous    Postoperative Plan: Postoperative administration of opioids is intended.    Pertinent diagnostic labs and testing reviewed    Informed Consent:    Anesthetic plan and risks discussed with patient.    Use of blood products discussed with: patient whom consented to blood products.         "

## 2020-10-22 NOTE — PROGRESS NOTES
2100 Report received from ROGER Cotton RN at bedside and assumed care. POC discussed with pt and s/o and encouraged to state needs or questions at any time.    2140 Pt transferred to PP via gurney with side rails up, infant in arms. Pt and infant stable.    2155 Report given to Joe POPE at bedside, bands verified and cuddles active.

## 2020-10-22 NOTE — OP REPORT
Operative Report  10/21/20      PreOp Diagnosis:   1. Evelyn @ 39w5d  2. Hx of c/s x1 desiring repeat    PostOp Diagnosis:   S/p repeat LTCS    Procedure(s):   SECTION, REPEAT - Wound Class: Clean Contaminated    Surgeon(s):  Rashmi Coles M.D.    Anesthesiologist/Type of Anesthesia:  Anesthesiologist: Francesca Mayo M.D./Spinal    Surgical Staff:  Circulator: Meche Cotton R.N.  Scrub Person: Fabio Boone; Kiera Meehan  L&ETTA Baby  Nurse: Micki Ryan R.N.      Estimated Blood Loss: 700cc  IVF: 1500cc  UOP: 350cc    Findings: vigorous male infant, LOT; APGARs 8/9, wt 4000g  Normal uterus, tubes, and ovaries.    Minimal anterior abdominal wall adhesions    Complications: none    Procedure in Detail:  The pt ws taken to the operating room where spinal anesthesia was placed and found to be adequate.  The patient was prepped and draped in a normal supine position with a wedge under the right hip.  A pfannensteil incision through her prior incision was made and taken down to the fascia with the scalpel, which was incised bilaterally with the scalpel.  The fascial incision was extended laterally with the Armstrong scissors.  The rectus muscles were dissected off the rectus sheath with Armstrong scissors superiorly and inferiorly.  There was separation of the rectus muscles superiorly and the peritoneum was entered bluntly.  The peritoneal incision was extended with blunt stretching and the Bovie with good visualization of the bladder.  The bladder blade was placed.  .  The lower uterine segment was incised transversely and the endometrial cavity entered with a blunt finger, clear fluid was noted.  The hysterotomy was extended with cephalad-caudad stretching.  The infant's head was manually elevated to the level of the hysterotomy and delivered, followed atraumatically by the anterior shoulder, posterior shoulder and body with help of fundal pressure.  A loose nuchal cord was reduced at the  hysterotomy.  The infant was stimulated, bulb suctioned, and cord clamping was delayed x60 seconds then the cord was clamped x2, cut and the infant handed to nurse.  Cord gases were not sent.  The placenta delivered with gentle cord traction and uterine massage and was noted to be intact with 3-vessel cord. The uterus was exteriorized, cleared of all clots and debris and closed in running, locked fashion with 0-monocryl.  The uterus was returned to the abdomen, the gutters cleared of all clots and debris and the hysterotomy re-examined and noted to be hemostatic.  The rectus muscles were inspected, found to be hemostatic.  The fascia was closed with 0-PDS.  The subcutaneous tissues was irrigated. Bleeders were coagulated with the bovie.  The subcutaneous tissues were approximated with running 2-0 plain and the skin with subcuticular suture with 4-0 monocryl.  Dermabond was placed, let dry, and followed by pressure dressing.  All counts were correct x3.  Pt and infant went to recovery in good condition.        Rashmi Coles MD  [facility], Women's Health

## 2020-10-22 NOTE — ANESTHESIA TIME REPORT
Anesthesia Start and Stop Event Times     Date Time Event    10/21/2020 1818 Ready for Procedure      Anesthesia Start      Anesthesia Stop        Responsible Staff  10/21/20    Name Role Begin End    Francesca Mayo M.D. Anesth 1924        Preop Diagnosis (Free Text):  Pre-op Diagnosis     Hx of previous c section        Preop Diagnosis (Codes):    Post op Diagnosis  Status post repeat low transverse  section      Premium Reason  A. 3PM - 7AM    Comments:

## 2020-10-22 NOTE — CARE PLAN
Problem: Alteration in comfort related to surgical incision and/or after birth pains  Goal: Patient is able to ambulate, care for self and infant with acceptable pain level  Outcome: PROGRESSING AS EXPECTED  Note: Pt is able to get up self and care for self and infant independently.  Pt verbalizes acceptable pain level.  Will ask RN if she needs more medication this shift.     Problem: Potential knowledge deficit related to lack of understanding of self and  care  Goal: Patient will demonstrate ability to care for self and infant  Outcome: PROGRESSING AS EXPECTED  Note: Pt is caring for self and infant independently.  Pt asks appropriate questions, and verbalizes understanding.

## 2020-10-22 NOTE — PROGRESS NOTES
Obstetrics & Gynecology Post-Delivery Progress Note    Date of Service      29 y.o.  s/p  for repeat  Delivery date: 10/21/20    Events  No events    Subjective  Pain: No  Bleeding: lochia minimal  Tolerating PO: yes  Voiding: kruse in place  Ambulating: no  Passing flatus: No  Feeding: breastfeeding well    Objective  24hr VS:  Temp:  [36 °C (96.8 °F)-36.6 °C (97.8 °F)] 36 °C (96.8 °F)  Pulse:  [61-90] 62  Resp:  [16-20] 16  BP: ()/(45-71) 96/64  SpO2:  [95 %-98 %] 96 %    Physical Exam  General: well  Chest/Breasts: nipples intact   Abdomen: nontender, soft, non-distended  Fundus: firm and below umbilicus  Incision: dressing clean, dry, intact  Perineum: deferred  Extremities: no edema, calves nontender    Labs:  CBC with WBC up to 18.2 from 11.1    Medications  oxytocin, 2,000 mL/hr, Intravenous, Once  acetaminophen, 1,000 mg, Oral, Q6HR  ketorolac, 30 mg, Intravenous, Q6HR  prenatal plus vitamin, 1 Tab, Oral, Daily-0800      misoprostol, oxyCODONE immediate-release, oxyCODONE immediate release, HYDROmorphone, HYDROmorphone, ePHEDrine, ondansetron, diphenhydrAMINE, diphenhydrAMINE **OR** diphenhydrAMINE **OR** naloxone 0.4 mg in 1000 mL infusion, LR, docusate sodium, bisacodyl, magnesium hydroxide, simethicone      Assessment/Plan  Kyara Olmstead Jones is a 29 y.o.yo  s/p postop day #1  s/p  for repeat    - Post care: meeting all goals  - Pain: controlled  - Rh+, Rubella Immune  - Method of Feeding: plans to breastfeed  - Method of Contraception: condoms  VTE prophylaxis: SCDs    - Disposition: likely home postop day 3

## 2020-10-22 NOTE — ANESTHESIA QCDR
2019 [facility] Clinical Data Registry (for Quality Improvement)     Postoperative nausea/vomiting risk protocol (Adult = 18 yrs and Pediatric 3-17 yrs)- (430 and 463)  General inhalation anesthetic (NOT TIVA) with PONV risk factors: No  Provision of anti-emetic therapy with at least 2 different classes of agents: N/A  Patient DID NOT receive anti-emetic therapy and reason is documented in Medical Record: N/A    Multimodal Pain Management- (477)  Non-emergent surgery AND patient age >= 18: Yes  Use of Multimodal Pain Management, two or more drugs and/or interventions, NOT including systemic opioids: Yes  Exception: Documented allergy to multiple classes of analgesics: N/A    Smoking Abstinence (404)  Patient is current smoker (cigarette, pipe, e-cig, marijuanna): No  Elective Surgery:   Abstinence instructions provided prior to day of surgery:   Patient abstained from smoking on day of surgery:     Pre-Op Beta-Blocker in Isolated CABG (44)  Isolated CABG AND patient age >= 18: No  Beta-blocker admin within 24 hours of surgical incision:   Exception:of medical reason(s) for not administering beta blocker within 24 hours prior to surgical incision (e.g., not  indicated,other medical reason):     PACU assessment of acute postoperative pain prior to Anesthesia Care End- Applies to Patients Age = 18- (ABG7)  Initial PACU pain score is which of the following: < 7/10  Patient unable to report pain score: N/A    Post-anesthetic transfer of care checklist/protocol to PACU/ICU- (426 and 427)  Upon conclusion of case, patient transferred to which of the following locations: PACU/Non-ICU  Use of transfer checklist/protocol: Yes  Exclusion: Service Performed in Patient Hospital Room (and thus did not require transfer): N/A  Unplanned admission to ICU related to anesthesia service up through end of PACU care- (MD51)  Unplanned admission to ICU (not initially anticipated at anesthesia start time): No

## 2020-10-22 NOTE — PROGRESS NOTES
0700- Bedside report received, assumed care of patient.  Pt denies needs at this time.  0855- Pt medicated per MAR.  Pt breast feeding infant, latching very well.  POC discussed.  Pt reports that she got up oob and stood for a few minutes, and brushed her teeth at the sink.  Pt reports she had no troubles, no dizziness, is doing well.  Assessment complete, wnl. Pt denies pain or other needs.  Call light in reach.  1000- Pt up oob, no assistance needed.  Kalpana care done and taught.  Herbert catheter removed, tolerated well.  Linens changed.  Pt changed into her own clothes.  Requests to stay up oob for a while.  Encouraged to call with needs.

## 2020-10-23 LAB
ERYTHROCYTE [DISTWIDTH] IN BLOOD BY AUTOMATED COUNT: 45.6 FL (ref 35.9–50)
HCT VFR BLD AUTO: 31.5 % (ref 37–47)
HGB BLD-MCNC: 10 G/DL (ref 12–16)
MCH RBC QN AUTO: 29.1 PG (ref 27–33)
MCHC RBC AUTO-ENTMCNC: 31.7 G/DL (ref 33.6–35)
MCV RBC AUTO: 91.6 FL (ref 81.4–97.8)
PLATELET # BLD AUTO: 181 K/UL (ref 164–446)
PMV BLD AUTO: 11 FL (ref 9–12.9)
RBC # BLD AUTO: 3.44 M/UL (ref 4.2–5.4)
WBC # BLD AUTO: 10.9 K/UL (ref 4.8–10.8)

## 2020-10-23 PROCEDURE — 85027 COMPLETE CBC AUTOMATED: CPT

## 2020-10-23 PROCEDURE — A9270 NON-COVERED ITEM OR SERVICE: HCPCS | Performed by: NURSE PRACTITIONER

## 2020-10-23 PROCEDURE — 36415 COLL VENOUS BLD VENIPUNCTURE: CPT

## 2020-10-23 PROCEDURE — 700102 HCHG RX REV CODE 250 W/ 637 OVERRIDE(OP): Performed by: NURSE PRACTITIONER

## 2020-10-23 PROCEDURE — A9270 NON-COVERED ITEM OR SERVICE: HCPCS | Performed by: OBSTETRICS & GYNECOLOGY

## 2020-10-23 PROCEDURE — 770002 HCHG ROOM/CARE - OB PRIVATE (112)

## 2020-10-23 PROCEDURE — 700102 HCHG RX REV CODE 250 W/ 637 OVERRIDE(OP): Performed by: OBSTETRICS & GYNECOLOGY

## 2020-10-23 RX ORDER — FERROUS SULFATE 325(65) MG
325 TABLET ORAL 2 TIMES DAILY WITH MEALS
Status: DISCONTINUED | OUTPATIENT
Start: 2020-10-23 | End: 2020-10-24 | Stop reason: HOSPADM

## 2020-10-23 RX ADMIN — IBUPROFEN 800 MG: 800 TABLET, FILM COATED ORAL at 02:12

## 2020-10-23 RX ADMIN — ACETAMINOPHEN 1000 MG: 500 TABLET ORAL at 11:33

## 2020-10-23 RX ADMIN — PRENATAL WITH FERROUS FUM AND FOLIC ACID 1 TABLET: 3080; 920; 120; 400; 22; 1.84; 3; 20; 10; 1; 12; 200; 27; 25; 2 TABLET ORAL at 09:09

## 2020-10-23 RX ADMIN — ACETAMINOPHEN 1000 MG: 500 TABLET ORAL at 06:27

## 2020-10-23 RX ADMIN — FERROUS SULFATE TAB 325 MG (65 MG ELEMENTAL FE) 325 MG: 325 (65 FE) TAB at 09:09

## 2020-10-23 RX ADMIN — IBUPROFEN 800 MG: 800 TABLET, FILM COATED ORAL at 11:33

## 2020-10-23 RX ADMIN — FERROUS SULFATE TAB 325 MG (65 MG ELEMENTAL FE) 325 MG: 325 (65 FE) TAB at 18:34

## 2020-10-23 ASSESSMENT — PAIN DESCRIPTION - PAIN TYPE: TYPE: ACUTE PAIN

## 2020-10-23 NOTE — LACTATION NOTE
This note was copied from a baby's chart.  Late entry: Mother states infant has been clusterfeeding today, denies pain or discomfort with latch. Infant had some low blood sugars earlier yesterday and was supplemented with donor breast milk. Sugars have been stable, so no donor milk has been needed since early this morning. Educated that if infant needs to be supplemented for additional low blood sugars, and mother wants to use donor milk, we would start her pumping.    Mother declines assistance with latch, encouraged mother to call for support as needed.

## 2020-10-23 NOTE — PROGRESS NOTES
Took report from Keyana POPE. Assumed patient care. Patient assessed VS stable. Pain reported as 1/10 on pain scale. Breasts soft. Fundus firm 1 below U, lochia light rubra. Dressing clean, dry, and intact. Patient advised to remove dressing now that she is 24hrs post op. Legs show no signs of swelling, heat, redness, pain. All questions and concerns answered at this time. Bed rails up x 2. Call light in reach. Patient belongings in reach. Will continue to monitor.      Injoy pam given, patient education gone over with parents of baby.

## 2020-10-23 NOTE — PROGRESS NOTES
Report received from Gwen MARAVILLA RN. Patient care assumed. Chart, prenatal labs, and orders reviewed.

## 2020-10-23 NOTE — CARE PLAN
Problem: Altered physiologic condition related to postoperative  delivery  Goal: Patient physiologically stable as evidenced by normal lochia, palpable uterine involution and vital signs within normal limits  Outcome: PROGRESSING AS EXPECTED  Note: Patient VS stable and within defined limits. Fundus firm 1 below U, lochia light rubra at time of assessment.      Problem: Potential knowledge deficit related to lack of understanding of self and  care  Goal: Patient will verbalize understanding of self and infant care  Outcome: PROGRESSING AS EXPECTED  Note: Patient is able to care for infant by feeding, diapering, and swaddling. Education gone over with parents. Parents verbalized their understanding.

## 2020-10-23 NOTE — PROGRESS NOTES
Post Partum Progress Note    Name:   Kyara Jones   Date/Time:  10/23/2020 - 7:25 AM  Chief Admitting Dx:  Pregnancy  Labor and delivery, indication for care  Delivery Type:   for repeat  Post-Op/Post Partum Days #:  2    Subjective:  Abdominal pain: yes  Ambulating:   yes  Tolerating liquids:  yes  Tolerating food:  yes common adult  Flatus:   yes  BM:    no  Bleeding:   with a small amount of bleeding  Voiding:   yes  Dizziness:   no  Feeding:   breast    Vitals:    10/22/20 1200 10/22/20 1400 10/22/20 1800 10/23/20 0600   BP:  105/55 105/43 101/61   Pulse: 65 71  68   Resp: 17 20 (!) 66 16   Temp:  36.6 °C (97.8 °F) 36.6 °C (97.8 °F) 36.5 °C (97.7 °F)   TempSrc:  Temporal Temporal Temporal   SpO2: 98% 93% 94% 97%   Weight:       Height:           Exam:  Breast: Lactating yes  Abdomen: Abdomen soft, non-tender. BS normal. No masses,  No organomegaly  Fundal Tenderness:  no  Fundus Firm: yes  Incision: DENISE  Below umbilicus: yes  Perineum: perineum intact  Lochia: mild  Extremities: Normal extremities, peripheral pulses and reflexes normal, no edema, redness or tenderness in the calves or thighs    Meds:  Current Facility-Administered Medications   Medication Dose   • ibuprofen (MOTRIN) tablet 800 mg  800 mg   • acetaminophen (TYLENOL) tablet 1,000 mg  1,000 mg   • oxyCODONE immediate-release (ROXICODONE) tablet 5 mg  5 mg   • oxyCODONE immediate release (ROXICODONE) tablet 10 mg  10 mg   • LR infusion     • oxytocin (PITOCIN) infusion (for postpartum)   mL/hr   • miSOPROStol (CYTOTEC) tablet 800 mcg  800 mcg   • LR infusion     • docusate sodium (COLACE) capsule 100 mg  100 mg   • bisacodyl (DULCOLAX) suppository 10 mg  10 mg   • magnesium hydroxide (MILK OF MAGNESIA) suspension 30 mL  30 mL   • simethicone (MYLICON) chewable tab 80 mg  80 mg   • prenatal plus vitamin (STUARTNATAL 1+1) 27-1 MG tablet 1 Tab  1 Tab       Labs:   Recent Labs     10/21/20  1628 10/22/20  0404  10/23/20  0618   WBC 11.1* 18.5* 10.9*   RBC 4.02* 3.73* 3.44*   HEMOGLOBIN 11.8* 11.2* 10.0*   HEMATOCRIT 35.2* 34.0* 31.5*   MCV 87.6 91.2 91.6   MCH 29.4 30.0 29.1   MCHC 33.5* 32.9* 31.7*   RDW 44.2 44.9 45.6   PLATELETCT 199 172 181   MPV 10.6 10.6 11.0       Assessment:  Chief Admitting Dx:  Pregnancy  Labor and delivery, indication for care  Delivery Type:   for repeat  Tubal Ligation:  no    Plan:  Continue routine post partum care.  Asx anemia - start iron  Encourage breastfeeding and ambulation  Anticipate DC home on POD#3    PRABHU Kessler.

## 2020-10-24 VITALS
DIASTOLIC BLOOD PRESSURE: 64 MMHG | OXYGEN SATURATION: 98 % | WEIGHT: 220 LBS | HEIGHT: 66 IN | RESPIRATION RATE: 18 BRPM | TEMPERATURE: 98.1 F | HEART RATE: 66 BPM | BODY MASS INDEX: 35.36 KG/M2 | SYSTOLIC BLOOD PRESSURE: 101 MMHG

## 2020-10-24 LAB
ERYTHROCYTE [DISTWIDTH] IN BLOOD BY AUTOMATED COUNT: 46.9 FL (ref 35.9–50)
HCT VFR BLD AUTO: 34.2 % (ref 37–47)
HGB BLD-MCNC: 11 G/DL (ref 12–16)
MCH RBC QN AUTO: 29.4 PG (ref 27–33)
MCHC RBC AUTO-ENTMCNC: 32.2 G/DL (ref 33.6–35)
MCV RBC AUTO: 91.4 FL (ref 81.4–97.8)
PLATELET # BLD AUTO: 196 K/UL (ref 164–446)
PMV BLD AUTO: 11 FL (ref 9–12.9)
RBC # BLD AUTO: 3.74 M/UL (ref 4.2–5.4)
WBC # BLD AUTO: 11.5 K/UL (ref 4.8–10.8)

## 2020-10-24 PROCEDURE — 85027 COMPLETE CBC AUTOMATED: CPT

## 2020-10-24 PROCEDURE — 700102 HCHG RX REV CODE 250 W/ 637 OVERRIDE(OP): Performed by: NURSE PRACTITIONER

## 2020-10-24 PROCEDURE — A9270 NON-COVERED ITEM OR SERVICE: HCPCS | Performed by: NURSE PRACTITIONER

## 2020-10-24 PROCEDURE — 700102 HCHG RX REV CODE 250 W/ 637 OVERRIDE(OP): Performed by: OBSTETRICS & GYNECOLOGY

## 2020-10-24 PROCEDURE — A9270 NON-COVERED ITEM OR SERVICE: HCPCS | Performed by: OBSTETRICS & GYNECOLOGY

## 2020-10-24 PROCEDURE — 36415 COLL VENOUS BLD VENIPUNCTURE: CPT

## 2020-10-24 RX ORDER — OXYCODONE HYDROCHLORIDE AND ACETAMINOPHEN 5; 325 MG/1; MG/1
1-2 TABLET ORAL EVERY 4 HOURS PRN
Qty: 20 TAB | Refills: 0 | Status: SHIPPED | OUTPATIENT
Start: 2020-10-24 | End: 2020-10-31

## 2020-10-24 RX ORDER — FERROUS SULFATE 325(65) MG
325 TABLET ORAL DAILY
Qty: 30 TAB | Refills: 0 | Status: SHIPPED | OUTPATIENT
Start: 2020-10-24 | End: 2022-02-09

## 2020-10-24 RX ORDER — PSEUDOEPHEDRINE HCL 30 MG
100 TABLET ORAL 2 TIMES DAILY PRN
Qty: 60 CAP | Refills: 0 | Status: SHIPPED | OUTPATIENT
Start: 2020-10-24 | End: 2022-02-09

## 2020-10-24 RX ORDER — IBUPROFEN 800 MG/1
800 TABLET ORAL 3 TIMES DAILY
Qty: 60 TAB | Refills: 0 | Status: SHIPPED | OUTPATIENT
Start: 2020-10-24 | End: 2022-02-09

## 2020-10-24 RX ADMIN — IBUPROFEN 800 MG: 800 TABLET, FILM COATED ORAL at 07:34

## 2020-10-24 RX ADMIN — FERROUS SULFATE TAB 325 MG (65 MG ELEMENTAL FE) 325 MG: 325 (65 FE) TAB at 07:34

## 2020-10-24 RX ADMIN — PRENATAL WITH FERROUS FUM AND FOLIC ACID 1 TABLET: 3080; 920; 120; 400; 22; 1.84; 3; 20; 10; 1; 12; 200; 27; 25; 2 TABLET ORAL at 07:34

## 2020-10-24 ASSESSMENT — PAIN DESCRIPTION - PAIN TYPE: TYPE: ACUTE PAIN

## 2020-10-24 NOTE — PROGRESS NOTES
Mother and infant discharge instructions completed by this RN, parents have no other questions at this time and verbalize understanding of follow-up appointments and when to call MD; mother and infant assessment and VS at baseline; infant placed in car seat by parents and they were escorted out to car by a CNA

## 2020-10-24 NOTE — PROGRESS NOTES
Took report from Sara POPE. Assumed patient care. Patient assessed VS stable. Pain reported as 0/10 on pain scale. Breasts soft. Fundus firm 1 below U, lochia light rubra. Legs show no signs of swelling, heat, redness, pain. All questions and concerns answered at this time. Bed rails up x 2. Call light in reach. Patient belongings in reach. Will continue to monitor.

## 2020-10-24 NOTE — DISCHARGE INSTRUCTIONS
POSTPARTUM DISCHARGE INSTRUCTIONS FOR MOM    YOB: 1990   Age: 29 y.o.               Admit Date: 10/21/2020     Discharge Date: 10/24/2020  Attending Doctor:  TINA Mireles*                  Allergies:  Patient has no known allergies.    Discharged to home by car. Discharged via wheelchair, hospital escort: Yes.  Special equipment needed: Not Applicable  Belongings with: Personal  Be sure to schedule a follow-up appointment with your primary care doctor or any specialists as instructed.     Discharge Plan:   Diet Plan: Discussed  Activity Level: Discussed  Confirmed Follow up Appointment: Appointment Scheduled  Confirmed Symptoms Management: Discussed  Medication Reconciliation Updated: No (Comments)  Influenza Vaccine Indication: Not indicated: Previously immunized this influenza season and > 8 years of age    REASONS TO CALL YOUR OBSTETRICIAN:  1.   Persistent fever or shaking chills (Temperature higher than 100.4)  2.   Heavy bleeding (soaking more than 1 pad per hour); Passing clots  3.   Foul odor from vagina  4.   Mastitis (Breast infection; breast pain, chills, fever, redness)  5.   Urinary pain, burning or frequency  6.   Abdominal incision infection  7.   Severe depression longer than 24 hours    HAND WASHING  · Prior to handling the baby.  · Before breastfeeding or bottle feeding baby.  · After using the bathroom or changing the baby's diaper.    WOUND CARE  Ask your physician for additional care instructions.  In general:    ·  Incision:      · Keep clean and dry.    · Do NOT lift anything heavier than your baby for up to 6 weeks.    · There should not be any opening or pus.      VAGINAL CARE  · Nothing inside vagina for 6 weeks: no sexual intercourse, tampons or douching.  · Bleeding may continue for 2-4 weeks.  Amount may vary.    · Call your physician for heavy bleeding which means soaking more than 1 pad per hour    BIRTH CONTROL  · It is possible to become pregnant at  "any time after delivery and while breastfeeding.  · Plan to discuss a method of birth control with your physician at your follow up visit. visit.    DIET AND ELIMINATION  · Eating more fiber (bran cereal, fruits, and vegetables) and drinking plenty of fluids will help to avoid constipation.  · Urinary frequency after childbirth is normal.    POSTPARTUM BLUES  During the first few days after birth, you may experience a sense of the \"blues\" which may include impatience, irritability or even crying.  These feeling come and go quickly.  However, as many as 1 in 10 women experience emotional symptoms known as postpartum depression.    Postpartum depression:  May start as early as the second or third day after delivery or take several weeks or months to develop.  Symptoms of \"blues\" are present, but are more intense:  Crying spells; loss of appetite; feelings of hopelessness or loss of control; fear of touching the baby; over concern or no concern at all about the baby; little or no concern about your own appearance/caring for yourself; and/or inability to sleep or excessive sleeping.  Contact your physician if you are experiencing any of these symptoms.    Crisis Hotline:  · [place] Crisis Hotline:  5-712-KPGHLER  Or 1-574.839.9781  · [place] Crisis Hotline:  1-737.158.2594  Or 614-804-7090    PREVENTING SHAKEN BABY:  If you are angry or stressed, PUT THE BABY IN THE CRIB, step away, take some deep breaths, and wait until you are calm to care for the baby.  DO NOT SHAKE THE BABY.  You are not alone, call a supporter for help.    · Crisis Call Center 24/7 crisis line 607-947-5446 or 1-764.939.4985  · You can also text them, text \"ANSWER\" to 141820    QUIT SMOKING/TOBACCO USE:  I understand the use of any tobacco products increases my chance of suffering from future heart disease and could cause other illnesses which may shorten my life. Quitting the use of tobacco products is the single most important thing I can do to " improve my health. For further information on smoking / tobacco cessation call a Toll Free Quit Line at 1-675.468.3682 (*[employer] [place]) or 1-159.885.6622 (American Lung Association) or you can access the web based program at www.lungusa.org.    · [place] Tobacco Users Help Line:  (821) 797-7577       Toll Free: 1-688.611.1907  · Quit Tobacco Program [facility] (992)881-7581    DEPRESSION / SUICIDE RISK:  As you are discharged from this [facility], it is important to learn how to keep safe from harming yourself.    Recognize the warning signs:  · Abrupt changes in personality, positive or negative- including increase in energy   · Giving away possessions  · Change in eating patterns- significant weight changes-  positive or negative  · Change in sleeping patterns- unable to sleep or sleeping all the time   · Unwillingness or inability to communicate  · Depression  · Unusual sadness, discouragement and loneliness  · Talk of wanting to die  · Neglect of personal appearance   · Rebelliousness- reckless behavior  · Withdrawal from people/activities they love  · Confusion- inability to concentrate     If you or a loved one observes any of these behaviors or has concerns about self-harm, here's what you can do:  · Talk about it- your feelings and reasons for harming yourself  · Remove any means that you might use to hurt yourself (examples: pills, rope, extension cords, firearm)  · Get professional help from the community (Mental Health, Substance Abuse, psychological counseling)  · Do not be alone:Call your Safe Contact- someone whom you trust who will be there for you.  · Call your local CRISIS HOTLINE 818-4281 or 422-252-6368  · Call your local Children's Mobile Crisis Response Team [place] (056) 716-7922 or www.Studio  · Call the toll free National Suicide Prevention Hotlines   · National Suicide Prevention Lifeline 736-345-VRIO (8550)  · National Hope Line  Network 800-SUICIDE (927-5160)    DISCHARGE SURVEY:  Thank you for choosing [facility].  We hope we provided you with very good care.  You may be receiving a survey in the mail.  Please fill it out.  Your opinion is valuable to us.    ADDITIONAL EDUCATIONAL MATERIALS GIVEN TO PATIENT: INJOY education pam        My signature on this form indicates that:  1.  I have reviewed and understand the above information  2.  My questions regarding this information have been answered to my satisfaction.  3.  I have formulated a plan with my discharge nurse to obtain my prescribed medication for home.

## 2020-10-24 NOTE — CARE PLAN
Problem: Altered physiologic condition related to postoperative  delivery  Goal: Patient physiologically stable as evidenced by normal lochia, palpable uterine involution and vital signs within normal limits  Outcome: PROGRESSING AS EXPECTED  Note: Patient VS stable and within defined limits. Fundus firm 1 below U, lochia light rubra at time of assessment.      Problem: Potential for postpartum infection related to surgical incision, compromised uterine condition, urinary tract or respiratory compromise  Goal: Patient will be afebrile and free from signs and symptoms of infection  Outcome: PROGRESSING AS EXPECTED  Note: Patient is afebrile and showing no signs or symptoms of infection at time of assessment.

## 2020-10-24 NOTE — CARE PLAN
Problem: Communication  Goal: The ability to communicate needs accurately and effectively will improve  Outcome: MET     Problem: Safety  Goal: Will remain free from injury  Outcome: MET  Goal: Will remain free from falls  Outcome: MET     Problem: Infection  Goal: Will remain free from infection  Outcome: MET     Problem: Venous Thromboembolism (VTW)/Deep Vein Thrombosis (DVT) Prevention:  Goal: Patient will participate in Venous Thrombosis (VTE)/Deep Vein Thrombosis (DVT)Prevention Measures  Outcome: MET     Problem: Bowel/Gastric:  Goal: Normal bowel function is maintained or improved  Outcome: MET  Goal: Will not experience complications related to bowel motility  Outcome: MET     Problem: Knowledge Deficit  Goal: Knowledge of disease process/condition, treatment plan, diagnostic tests, and medications will improve  Outcome: MET  Goal: Knowledge of the prescribed therapeutic regimen will improve  Outcome: MET     Problem: Discharge Barriers/Planning  Goal: Patient's continuum of care needs will be met  Outcome: MET     Problem: Medication  Goal: Compliance with prescribed medication will improve  Outcome: MET     Problem: Fluid Volume:  Goal: Will maintain balanced intake and output  Outcome: MET     Problem: Pain Management  Goal: Pain level will decrease to patient's comfort goal  Outcome: MET     Problem: Altered physiologic condition related to postoperative  delivery  Goal: Patient physiologically stable as evidenced by normal lochia, palpable uterine involution and vital signs within normal limits  Outcome: MET     Problem: Potential for postpartum infection related to surgical incision, compromised uterine condition, urinary tract or respiratory compromise  Goal: Patient will be afebrile and free from signs and symptoms of infection  Outcome: MET     Problem: Alteration in comfort related to surgical incision and/or after birth pains  Goal: Patient is able to ambulate, care for self and infant  with acceptable pain level  Outcome: MET  Goal: Patient verbalizes acceptable pain level  Outcome: MET     Problem: Potential knowledge deficit related to lack of understanding of self and  care  Goal: Patient will verbalize understanding of self and infant care  Outcome: MET  Goal: Patient will demonstrate ability to care for self and infant  Outcome: MET     Problem: Potential anxiety related to difficulty adapting to parental role  Goal: Patient will verbalize and demonstrate effective bonding and parenting behavior  Outcome: MET     Problem: Urinary Elimination:  Goal: Ability to reestablish a normal urinary elimination pattern will improve  Outcome: MET

## 2020-10-24 NOTE — LACTATION NOTE
Mother states she just finished feeding infant. Denies difficulties or discomfort with latch. She reports feeling martin.    Plan is to continue to breastfeed with feeding cues.     All outpatient lactation, zoom meeting info, and Injoy pam provided on previous lactation visit.     No other questions or concerns at this time.

## 2020-11-16 ENCOUNTER — POST PARTUM (OUTPATIENT)
Dept: OBGYN | Facility: CLINIC | Age: 30
End: 2020-11-16
Payer: COMMERCIAL

## 2020-11-16 VITALS — WEIGHT: 199 LBS | SYSTOLIC BLOOD PRESSURE: 118 MMHG | DIASTOLIC BLOOD PRESSURE: 84 MMHG | BODY MASS INDEX: 32.12 KG/M2

## 2020-11-16 DIAGNOSIS — Z51.89 VISIT FOR WOUND CHECK: ICD-10-CM

## 2020-11-16 DIAGNOSIS — Z30.013 ENCOUNTER FOR INITIAL PRESCRIPTION OF INJECTABLE CONTRACEPTIVE: ICD-10-CM

## 2020-11-16 PROCEDURE — 90050 PR POSTPARTUM VISIT: CPT | Performed by: OBSTETRICS & GYNECOLOGY

## 2020-11-16 PROCEDURE — 96372 THER/PROPH/DIAG INJ SC/IM: CPT | Performed by: OBSTETRICS & GYNECOLOGY

## 2020-11-16 RX ORDER — MEDROXYPROGESTERONE ACETATE 150 MG/ML
150 INJECTION, SUSPENSION INTRAMUSCULAR ONCE
Status: COMPLETED | OUTPATIENT
Start: 2020-11-16 | End: 2020-11-16

## 2020-11-16 RX ADMIN — MEDROXYPROGESTERONE ACETATE 150 MG: 150 INJECTION, SUSPENSION INTRAMUSCULAR at 11:30

## 2020-11-16 NOTE — LETTER
November 16, 2020    To Whom It May Concern:         This is confirmation that Kyara Olmstead Roebrt attended her scheduled appointment with Keke Huntley M.D. on 11/16/20.         If you have any questions please do not hesitate to call me at the phone number listed below.  Kyara is to return to work with the restriction of no lifting more than 10 lbs   Sincerely,          Keke Huntley M.D          [phone]

## 2020-11-16 NOTE — PROGRESS NOTES
Kyara Jones Is a 30 y.o.  status post repeat  delivery on 10/21/20. Patient is here today for an incision check. Currently the patient is without complaints.  She reports Normal  BM.  Normal  appetite without nausea and vomiting. She denies fever. Pain is under good control.  Minimal lochia.  Breast-feeding.  Mood is good.    /84   Wt 90.3 kg (199 lb)   LMP 2020 (Approximate)   BMI 32.12 kg/m²   ABD Abdomen soft, non-tender. BS normal. No masses or organomegaly  Incision well healed, dry and intact      Assessment and plan  Status post  delivery  No complications incision healing well  Followup in 4 weeks for final postpartum checkup  Desires Depo-Provera for contraception, injection given today.  Reviewed risks of irregular bleeding, amenorrhea, worsening depression, acne, hirsutism. Patient states no intercourse since delivery.

## 2020-11-16 NOTE — PROGRESS NOTES
Pt here today for postpartum exam.  Operation Date: 10/21/2020  Currently: breast feeding   BCM: desires depo   Good ph:   Depo Shot given today   R Dorsogluteal

## 2021-02-17 ENCOUNTER — NON-PROVIDER VISIT (OUTPATIENT)
Dept: OBGYN | Facility: CLINIC | Age: 31
End: 2021-02-17
Payer: COMMERCIAL

## 2021-02-17 VITALS
WEIGHT: 213.6 LBS | DIASTOLIC BLOOD PRESSURE: 80 MMHG | BODY MASS INDEX: 34.33 KG/M2 | SYSTOLIC BLOOD PRESSURE: 120 MMHG | HEIGHT: 66 IN

## 2021-02-17 DIAGNOSIS — Z30.42 DEPO-PROVERA CONTRACEPTIVE STATUS: ICD-10-CM

## 2021-02-17 PROCEDURE — 96372 THER/PROPH/DIAG INJ SC/IM: CPT | Performed by: NURSE PRACTITIONER

## 2021-02-17 RX ORDER — MEDROXYPROGESTERONE ACETATE 150 MG/ML
150 INJECTION, SUSPENSION INTRAMUSCULAR ONCE
Status: COMPLETED | OUTPATIENT
Start: 2021-02-17 | End: 2021-02-17

## 2021-02-17 RX ADMIN — MEDROXYPROGESTERONE ACETATE 150 MG: 150 INJECTION, SUSPENSION INTRAMUSCULAR at 10:50

## 2021-02-17 NOTE — NON-PROVIDER
Patient here for a Depo Shot    Last seen on 2021  pharmacy verified.  Patient phone #: [phone]    NDC: 5634-7494-95  LOT#: [id number]  Expiration Date: 2022  Dose: 150 Mg  Site: Right Deltoid  Patient educated on use and side effects of medication. Name and  verified prior to injection. Pt tolerated? Well   Verified by LORETTA  Administered by Vitaliy Byrd Ass't at 10:12 AM.  Patient Provided Medication: no  Next Shot due 2021-2021

## 2021-05-11 ENCOUNTER — NON-PROVIDER VISIT (OUTPATIENT)
Dept: OBGYN | Facility: CLINIC | Age: 31
End: 2021-05-11
Payer: COMMERCIAL

## 2021-05-11 DIAGNOSIS — Z30.019 ENCOUNTER FOR FEMALE BIRTH CONTROL: ICD-10-CM

## 2021-05-11 PROBLEM — O09.893 SUPERVISION OF OTHER HIGH RISK PREGNANCIES, THIRD TRIMESTER: Status: RESOLVED | Noted: 2020-09-04 | Resolved: 2021-05-11

## 2021-05-11 PROBLEM — O34.219 HISTORY OF CESAREAN DELIVERY, CURRENTLY PREGNANT: Status: RESOLVED | Noted: 2020-04-17 | Resolved: 2021-05-11

## 2021-05-11 PROCEDURE — 96372 THER/PROPH/DIAG INJ SC/IM: CPT | Performed by: PHYSICIAN ASSISTANT

## 2021-05-11 RX ORDER — MEDROXYPROGESTERONE ACETATE 150 MG/ML
150 INJECTION, SUSPENSION INTRAMUSCULAR ONCE
Status: COMPLETED | OUTPATIENT
Start: 2021-05-11 | End: 2021-05-11

## 2021-05-11 RX ADMIN — MEDROXYPROGESTERONE ACETATE 150 MG: 150 INJECTION, SUSPENSION INTRAMUSCULAR at 15:57

## 2021-05-11 NOTE — NON-PROVIDER
Patient here for Depo provera.  Last dose given 2/17/21.   Next dose due 7/27/21 to 8/10/21  Mediation verified by AS

## 2021-08-02 ENCOUNTER — NON-PROVIDER VISIT (OUTPATIENT)
Dept: OBGYN | Facility: CLINIC | Age: 31
End: 2021-08-02
Payer: COMMERCIAL

## 2021-08-02 DIAGNOSIS — Z30.42 ENCOUNTER FOR SURVEILLANCE OF INJECTABLE CONTRACEPTIVE: ICD-10-CM

## 2021-08-02 PROCEDURE — 96372 THER/PROPH/DIAG INJ SC/IM: CPT | Performed by: OBSTETRICS & GYNECOLOGY

## 2021-08-02 RX ORDER — MEDROXYPROGESTERONE ACETATE 150 MG/ML
150 INJECTION, SUSPENSION INTRAMUSCULAR
OUTPATIENT
Start: 2021-08-02

## 2021-08-02 RX ADMIN — MEDROXYPROGESTERONE ACETATE 150 MG: 150 INJECTION, SUSPENSION INTRAMUSCULAR at 16:02

## 2021-08-02 NOTE — PROGRESS NOTES
Pt here for Nurse visit for Depo-Provera injection  LMP: has not had  Period, pt states her period stopped on the 2nd injection.  Last injection received: 2021 due between - August 10  Good # [phone]    Depo-Provera injection administered today 2021  NDC: 1601-6739-52  LOT#: [id number]  Expiration Date: 2023  Dose: 150 mg  Site: Right Deltoid  Patient educated on use and side effects of medication. Name and  verified prior to injection. Pt tolerated? Yes   Verified by Yvonne Saldaña (MA)  Administered by Vitaliy Lynch Ass't at 4:02 PM.  Patient Provided Medication: No.    Next Depo-Provera injection due: Oct 18 -  ()  Pt informed and reminder card given.

## 2021-10-20 ENCOUNTER — NON-PROVIDER VISIT (OUTPATIENT)
Dept: OBGYN | Facility: CLINIC | Age: 31
End: 2021-10-20
Payer: COMMERCIAL

## 2021-10-20 DIAGNOSIS — Z30.42 ENCOUNTER FOR DEPO-PROVERA CONTRACEPTION: ICD-10-CM

## 2021-10-20 PROCEDURE — 96372 THER/PROPH/DIAG INJ SC/IM: CPT | Performed by: NURSE PRACTITIONER

## 2021-10-20 RX ORDER — MEDROXYPROGESTERONE ACETATE 150 MG/ML
150 INJECTION, SUSPENSION INTRAMUSCULAR ONCE
Status: CANCELLED | OUTPATIENT
Start: 2021-10-20 | End: 2021-10-20

## 2021-10-20 RX ADMIN — MEDROXYPROGESTERONE ACETATE 150 MG: 150 INJECTION, SUSPENSION INTRAMUSCULAR at 13:25

## 2021-10-20 NOTE — NON-PROVIDER
Patient here for Depo provera injection.  Medication verified by JK.   Next dose due 1/5/22 to 1/19/2022

## 2022-01-07 ENCOUNTER — NON-PROVIDER VISIT (OUTPATIENT)
Dept: OBGYN | Facility: CLINIC | Age: 32
End: 2022-01-07
Payer: COMMERCIAL

## 2022-01-07 VITALS — DIASTOLIC BLOOD PRESSURE: 66 MMHG | WEIGHT: 208 LBS | SYSTOLIC BLOOD PRESSURE: 112 MMHG | BODY MASS INDEX: 33.57 KG/M2

## 2022-01-07 DIAGNOSIS — Z30.42 DEPO-PROVERA CONTRACEPTIVE STATUS: Primary | ICD-10-CM

## 2022-01-07 PROCEDURE — 96372 THER/PROPH/DIAG INJ SC/IM: CPT | Performed by: NURSE PRACTITIONER

## 2022-01-07 RX ORDER — MEDROXYPROGESTERONE ACETATE 150 MG/ML
150 INJECTION, SUSPENSION INTRAMUSCULAR ONCE
OUTPATIENT
Start: 2022-01-07 | End: 2022-01-10

## 2022-01-07 RX ADMIN — MEDROXYPROGESTERONE ACETATE 150 MG: 150 INJECTION, SUSPENSION INTRAMUSCULAR at 10:18

## 2022-01-07 NOTE — PATIENT INSTRUCTIONS
NDC: 5909-5880-20  LOT#: [id number]  Expiration Date: 22    Dose: 150mg/ml  Site: Left Deltoid    Patient educated on use and side effects of medication. Name and  verified prior to injection. Pt tolerated? well     Verified by Angelita Birch    Administered by Vitaliy Lentz Ass't at 10:16 AM.    Patient Provided Medication:NO

## 2022-01-07 NOTE — NON-PROVIDER
Pt here for Depo Provera injection  Last injection 10/20/21  Next injection due 3/25/22-22    NDC: 6306-8566-41  LOT#: [id number]  Expiration Date: 22    Dose: 150mg/ml  Site: Left Deltoid    Patient educated on use and side effects of medication. Name and  verified prior to injection. Pt tolerated? well     Verified by Angelita Birch    Administered by Vitaliy Lentz Ass't at 10:16 AM.    Patient Provided Medication:NO

## 2022-02-09 ENCOUNTER — GYNECOLOGY VISIT (OUTPATIENT)
Dept: OBGYN | Facility: CLINIC | Age: 32
End: 2022-02-09
Payer: COMMERCIAL

## 2022-02-09 ENCOUNTER — HOSPITAL ENCOUNTER (OUTPATIENT)
Facility: MEDICAL CENTER | Age: 32
End: 2022-02-09
Attending: NURSE PRACTITIONER
Payer: COMMERCIAL

## 2022-02-09 VITALS — WEIGHT: 200 LBS | DIASTOLIC BLOOD PRESSURE: 72 MMHG | BODY MASS INDEX: 32.28 KG/M2 | SYSTOLIC BLOOD PRESSURE: 108 MMHG

## 2022-02-09 DIAGNOSIS — Z01.419 WELL WOMAN EXAM WITH ROUTINE GYNECOLOGICAL EXAM: ICD-10-CM

## 2022-02-09 DIAGNOSIS — N89.8 VAGINAL DISCHARGE: ICD-10-CM

## 2022-02-09 LAB
C TRACH DNA SPEC QL NAA+PROBE: NEGATIVE
CANDIDA DNA VAG QL PROBE+SIG AMP: NEGATIVE
G VAGINALIS DNA VAG QL PROBE+SIG AMP: NEGATIVE
N GONORRHOEA DNA SPEC QL NAA+PROBE: POSITIVE
SPECIMEN SOURCE: ABNORMAL
T VAGINALIS DNA VAG QL PROBE+SIG AMP: POSITIVE

## 2022-02-09 PROCEDURE — 87591 N.GONORRHOEAE DNA AMP PROB: CPT

## 2022-02-09 PROCEDURE — 87491 CHLMYD TRACH DNA AMP PROBE: CPT

## 2022-02-09 PROCEDURE — 99395 PREV VISIT EST AGE 18-39: CPT | Performed by: NURSE PRACTITIONER

## 2022-02-09 PROCEDURE — 87660 TRICHOMONAS VAGIN DIR PROBE: CPT

## 2022-02-09 PROCEDURE — 87510 GARDNER VAG DNA DIR PROBE: CPT

## 2022-02-09 PROCEDURE — 87480 CANDIDA DNA DIR PROBE: CPT

## 2022-02-09 RX ORDER — METRONIDAZOLE 500 MG/1
500 TABLET ORAL ONCE
Qty: 4 TABLET | Refills: 0 | Status: SHIPPED | OUTPATIENT
Start: 2022-02-09 | End: 2022-02-09

## 2022-02-09 NOTE — NON-PROVIDER
Patient here for a GYN follow up.   Last seen on 11/16/20  C/o STD testing  pharmacy verified.  [phone] (home)

## 2022-02-09 NOTE — PROGRESS NOTES
Kyara Jones is a 31 y.o. y.o. female ho presents for her annual gynecological exam.       HPI Comments: Pt reports vaginal d/c that smells and is itchy x less than one week. Reports it just smells different, not super strong or like fish and the itching is more on outside of vagina and thinks is from irritation of d/c. She had a new sexual partner a little over a week ago and wants to test and make sure she was not exposed to any STIs.     Review of Systems:  Cardio: Denies any issues.   Respiratory: Denies any issues.   Constitutional:  Denies any issues.   : Alfredito any issues.   Abdominal: Denies any issues.   Psychosocial: Denies any issues.   EENT: Denies any issues.   Metabolic: Denies any issues.   Pertinent positives documented in HPI and all other systems reviewed & are negative.     Gynecological hx:   Last pap was 2020 and WNL. No hx of abnormal cervical cytology.     Reports hx of chlamydia when she was younger. Denies any other hx of STIs and was last tested for STIs 2020.     LMP n/a due to being on depo. Diego has regular periods every 28 days lasting 5 days and started menstruating at age 13.     Currently sexually active with 1 sexual partner who identifies as a man. She has had a total of 20 sexual partners in lifetime. She is satisfied with her sexual experiences.     Diego does use depo for birth control. Does not desire a pregnancy at this time and would like to continue using the depo. She uses condoms on and off for safe sex methods but most recently did not use with this recent sexual encounter.     Denies any history of breast issues, surgeries, cancer.     OB Hx:  - see Ob Hx    Denies any psychological hx including hospitalizations and psych medication. She is currently in therapy due to her FOB leaving her for another woman but is feeling well and does not need any other referrals for this issue.   Denies any hx of or current issues with interpersonal violence.    Denies any use of tobacco, alcohol, drugs.     All PMH, PSH, allergies, social history and FH reviewed and updated today:  History reviewed. No pertinent past medical history.  Past Surgical History:   Procedure Laterality Date   • REPEAT C SECTION  10/21/2020    Procedure:  SECTION, REPEAT;  Surgeon: Rashmi Coles M.D.;  Location: LABOR AND DELIVERY;  Service: Obstetrics   • PRIMARY C SECTION  2017    Procedure: PRIMARY C SECTION;  Surgeon: Keke Huntley M.D.;  Location: LABOR AND DELIVERY;  Service:    • DILATION AND EVACUATION N/A 2016    Procedure: DILATION AND EVACUATION;  Surgeon: Joe Ramirez M.D.;  Location: SURGERY SAME DAY [facility];  Service:      Patient has no known allergies.  Social History     Socioeconomic History   • Marital status: Single     Spouse name: Not on file   • Number of children: Not on file   • Years of education: Not on file   • Highest education level: Not on file   Occupational History   • Not on file   Tobacco Use   • Smoking status: Former Smoker     Packs/day: 0.25     Years: 4.00     Pack years: 1.00     Types: Cigarettes     Quit date: 2017     Years since quittin.8   • Smokeless tobacco: Never Used   Vaping Use   • Vaping Use: Never used   Substance and Sexual Activity   • Alcohol use: No     Comment: occ   • Drug use: No   • Sexual activity: Yes     Partners: Male   Other Topics Concern   • Not on file   Social History Narrative   • Not on file     Social Determinants of Health     Financial Resource Strain:    • Difficulty of Paying Living Expenses: Not on file   Food Insecurity:    • Worried About Running Out of Food in the Last Year: Not on file   • Ran Out of Food in the Last Year: Not on file   Transportation Needs:    • Lack of Transportation (Medical): Not on file   • Lack of Transportation (Non-Medical): Not on file   Physical Activity:    • Days of Exercise per Week: Not on file   • Minutes of Exercise per Session: Not on  file   Stress:    • Feeling of Stress : Not on file   Social Connections:    • Frequency of Communication with Friends and Family: Not on file   • Frequency of Social Gatherings with Friends and Family: Not on file   • Attends Orthodox Services: Not on file   • Active Member of Clubs or Organizations: Not on file   • Attends Club or Organization Meetings: Not on file   • Marital Status: Not on file   Intimate Partner Violence:    • Fear of Current or Ex-Partner: Not on file   • Emotionally Abused: Not on file   • Physically Abused: Not on file   • Sexually Abused: Not on file   Housing Stability:    • Unable to Pay for Housing in the Last Year: Not on file   • Number of Places Lived in the Last Year: Not on file   • Unstable Housing in the Last Year: Not on file     Family History   Problem Relation Age of Onset   • Arthritis Paternal Grandmother    • Hypertension Paternal Grandmother      Medications:   No current [facility]-ordered outpatient medications on file.     Current Facility-Administered Medications Ordered in Epic   Medication Dose Route Frequency Provider Last Rate Last Admin   • medroxyPROGESTERone (DEPO-PROVERA) injection 150 mg  150 mg Intramuscular Q90 DAYS Keke Huntley M.D.   150 mg at 01/07/22 1018          Objective:   Vital measurements:  There were no vitals taken for this visit.  There is no height or weight on file to calculate BMI. (Goal BM I>18 <25)    Physical Exam   Nursing note and vitals reviewed.    Constitutional: She is oriented to person, place, and time. She appears well-developed and well-nourished. No distress.     HEENT:   Head: Normocephalic and atraumatic.   Right Ear: External ear normal.   Left Ear: External ear normal.   Nose: Nose normal.   Eyes: Conjunctivae and EOM are normal. Pupils are equal, round, and reactive to light. No scleral icterus.     Neck: Normal range of motion. Neck supple. No tracheal deviation present. No thyromegaly present.     Pulmonary/Chest: Effort  normal and breath sounds normal. No respiratory distress. She has no wheezes. She has no rales. She exhibits no tenderness.     Cardiovascular: Regular, rate and rhythm. No JVD.    Abdominal: Soft. Bowel sounds are normal. She exhibits no distension and no mass. No tenderness. She has no rebound and no guarding.     Breast:  Symmetrical, normal consistency without masses., No dimpling or skin changes    Genitourinary:  Pelvic exam was deferred, pt self collected her vaginal swabs.     Musculoskeletal: Normal range of motion. She exhibits no edema and no tenderness.     Lymphadenopathy: She has no cervical adenopathy.     Neurological: She is alert and oriented to person, place, and time. She exhibits normal muscle tone.     Skin: Skin is warm and dry. No rash noted. She is not diaphoretic. No erythema. No pallor.     Psychiatric: She has a normal mood and affect. Her behavior is normal. Judgment and thought content normal.      Assessment:     Well woman exam     Plan:   Pap uptodate from 2020; gc/ct and vag path collected   STI screening via blood also accepted today  Monthly self breast exam education provided  Safer sex methods reviewed  Using depo and is not due to next shot at this time  Pt reports she does not have PCP--> information provided on where to establish/ pt desires to do baseline labs with us today   Encourage exercise and proper diet  Return to clinic: one year

## 2022-02-10 ENCOUNTER — TELEPHONE (OUTPATIENT)
Dept: OBGYN | Facility: CLINIC | Age: 32
End: 2022-02-10

## 2022-02-10 RX ORDER — AZITHROMYCIN 500 MG/1
1000 TABLET, FILM COATED ORAL ONCE
Qty: 2 TABLET | Refills: 0 | Status: SHIPPED | OUTPATIENT
Start: 2022-02-10 | End: 2022-02-10

## 2022-02-10 NOTE — TELEPHONE ENCOUNTER
----- Message from TONJA Cox sent at 2/9/2022  7:38 PM PST -----  Let pt know she has an STI called trich and needs treatment and all sexual partners from past three months need treatment. Her gc/ct is still pending.      TONJA Cox   2/9/2022 10:38 PM PST         She also has gonorrhea, she needs treatment can she do that with us or needs to go to health department?        Pt notified regarding positive Trichomonas and gonorrhea. Explained to Rx for Trich was set to her pharmacy. Explained to pt is 4 pill to take as one time dose. Advised to take with food but not with milk, to avoid alcohol for 7 days. For gonorrhea Tx explained to pt Rx for azithromycin will be send and needs to be schedule for Rocephin inject in the clinic. Advised pt to take azithromycin tomorrow right before/after receiving her Rocephin injection. Advised for her partner or any partners in the last 3 months need to be treated with his PCP or [facility]. Advised pt avoid intercourse for 1 week after both have receive Tx. Pt agreed and verbalized understanding.   Pt scheduled for Rocephin injection for tomorrow at 1330. pt agreed.   HD form and lab results faxed to [facility].

## 2022-02-11 ENCOUNTER — NON-PROVIDER VISIT (OUTPATIENT)
Dept: OBGYN | Facility: CLINIC | Age: 32
End: 2022-02-11
Payer: COMMERCIAL

## 2022-02-11 DIAGNOSIS — A54.9 GONORRHEA: ICD-10-CM

## 2022-02-11 PROCEDURE — 96372 THER/PROPH/DIAG INJ SC/IM: CPT | Performed by: NURSE PRACTITIONER

## 2022-02-11 RX ORDER — METRONIDAZOLE 500 MG/1
500 TABLET ORAL ONCE
Qty: 4 TABLET | Refills: 0 | Status: SHIPPED | OUTPATIENT
Start: 2022-02-11 | End: 2022-02-11

## 2022-02-11 NOTE — PROGRESS NOTES
Pt here today for Rocephin injection  [facility] # [phone]      Rocephin injection administered today 2022  NDC: 8326-1635-89  LOT#: [id number]  Expiration Date: 2024  Dose: 500 mg  Site: Right Upper Outer Quadrant Gluteal  Patient educated on use and side effects of medication. Name and  verified prior to injection. Pt tolerated? Yes   Verified by Destiny EVANGELISTA   Administered by Vitaliy Lynch Ass't at 2:06 PM.  Patient Provided Medication: No      Pt also informed her Rx for Trich has been sent to her pharmacy.

## 2022-03-22 ENCOUNTER — HOSPITAL ENCOUNTER (OUTPATIENT)
Dept: LAB | Facility: MEDICAL CENTER | Age: 32
End: 2022-03-22
Attending: NURSE PRACTITIONER
Payer: COMMERCIAL

## 2022-03-22 DIAGNOSIS — Z01.419 WELL WOMAN EXAM WITH ROUTINE GYNECOLOGICAL EXAM: ICD-10-CM

## 2022-03-22 LAB
25(OH)D3 SERPL-MCNC: 23 NG/ML (ref 30–100)
ALBUMIN SERPL BCP-MCNC: 4.8 G/DL (ref 3.2–4.9)
ALBUMIN/GLOB SERPL: 1.8 G/DL
ALP SERPL-CCNC: 79 U/L (ref 30–99)
ALT SERPL-CCNC: 47 U/L (ref 2–50)
ANION GAP SERPL CALC-SCNC: 18 MMOL/L (ref 7–16)
AST SERPL-CCNC: 43 U/L (ref 12–45)
BILIRUB SERPL-MCNC: 1.4 MG/DL (ref 0.1–1.5)
BUN SERPL-MCNC: 10 MG/DL (ref 8–22)
CALCIUM SERPL-MCNC: 9.7 MG/DL (ref 8.5–10.5)
CHLORIDE SERPL-SCNC: 104 MMOL/L (ref 96–112)
CHOLEST SERPL-MCNC: 153 MG/DL (ref 100–199)
CO2 SERPL-SCNC: 18 MMOL/L (ref 20–33)
CREAT SERPL-MCNC: 0.82 MG/DL (ref 0.5–1.4)
ERYTHROCYTE [DISTWIDTH] IN BLOOD BY AUTOMATED COUNT: 42.8 FL (ref 35.9–50)
EST. AVERAGE GLUCOSE BLD GHB EST-MCNC: 100 MG/DL
FASTING STATUS PATIENT QL REPORTED: NORMAL
GFR SERPLBLD CREATININE-BSD FMLA CKD-EPI: 98 ML/MIN/1.73 M 2
GLOBULIN SER CALC-MCNC: 2.7 G/DL (ref 1.9–3.5)
GLUCOSE SERPL-MCNC: 83 MG/DL (ref 65–99)
HAV IGM SERPL QL IA: NORMAL
HBA1C MFR BLD: 5.1 % (ref 4–5.6)
HBV CORE IGM SER QL: NORMAL
HBV SURFACE AG SER QL: NORMAL
HCT VFR BLD AUTO: 43.1 % (ref 37–47)
HCV AB SER QL: NORMAL
HDLC SERPL-MCNC: 71 MG/DL
HGB BLD-MCNC: 14.4 G/DL (ref 12–16)
HIV 1+2 AB+HIV1 P24 AG SERPL QL IA: NORMAL
LDLC SERPL CALC-MCNC: 47 MG/DL
MCH RBC QN AUTO: 31.2 PG (ref 27–33)
MCHC RBC AUTO-ENTMCNC: 33.4 G/DL (ref 33.6–35)
MCV RBC AUTO: 93.5 FL (ref 81.4–97.8)
PLATELET # BLD AUTO: 265 K/UL (ref 164–446)
PMV BLD AUTO: 10.2 FL (ref 9–12.9)
POTASSIUM SERPL-SCNC: 3.5 MMOL/L (ref 3.6–5.5)
PROT SERPL-MCNC: 7.5 G/DL (ref 6–8.2)
RBC # BLD AUTO: 4.61 M/UL (ref 4.2–5.4)
SODIUM SERPL-SCNC: 140 MMOL/L (ref 135–145)
T PALLIDUM AB SER QL IA: NORMAL
TRIGL SERPL-MCNC: 175 MG/DL (ref 0–149)
TSH SERPL DL<=0.005 MIU/L-ACNC: 1.61 UIU/ML (ref 0.38–5.33)
WBC # BLD AUTO: 11.1 K/UL (ref 4.8–10.8)

## 2022-03-22 PROCEDURE — 84443 ASSAY THYROID STIM HORMONE: CPT

## 2022-03-22 PROCEDURE — 36415 COLL VENOUS BLD VENIPUNCTURE: CPT

## 2022-03-22 PROCEDURE — 87389 HIV-1 AG W/HIV-1&-2 AB AG IA: CPT

## 2022-03-22 PROCEDURE — 80074 ACUTE HEPATITIS PANEL: CPT

## 2022-03-22 PROCEDURE — 85027 COMPLETE CBC AUTOMATED: CPT

## 2022-03-22 PROCEDURE — 80053 COMPREHEN METABOLIC PANEL: CPT

## 2022-03-22 PROCEDURE — 83036 HEMOGLOBIN GLYCOSYLATED A1C: CPT

## 2022-03-22 PROCEDURE — 80061 LIPID PANEL: CPT

## 2022-03-22 PROCEDURE — 82306 VITAMIN D 25 HYDROXY: CPT

## 2022-03-22 PROCEDURE — 86780 TREPONEMA PALLIDUM: CPT

## 2022-03-23 ENCOUNTER — HOSPITAL ENCOUNTER (OUTPATIENT)
Facility: MEDICAL CENTER | Age: 32
End: 2022-03-23
Attending: NURSE PRACTITIONER
Payer: COMMERCIAL

## 2022-03-23 ENCOUNTER — GYNECOLOGY VISIT (OUTPATIENT)
Dept: OBGYN | Facility: CLINIC | Age: 32
End: 2022-03-23
Payer: COMMERCIAL

## 2022-03-23 VITALS — WEIGHT: 195.8 LBS | DIASTOLIC BLOOD PRESSURE: 72 MMHG | BODY MASS INDEX: 31.6 KG/M2 | SYSTOLIC BLOOD PRESSURE: 116 MMHG

## 2022-03-23 DIAGNOSIS — E55.9 VITAMIN D DEFICIENCY: ICD-10-CM

## 2022-03-23 DIAGNOSIS — Z11.3 ROUTINE SCREENING FOR STI (SEXUALLY TRANSMITTED INFECTION): ICD-10-CM

## 2022-03-23 DIAGNOSIS — D72.829 LEUKOCYTOSIS, UNSPECIFIED TYPE: ICD-10-CM

## 2022-03-23 DIAGNOSIS — E78.2 ELEVATED TRIGLYCERIDES WITH HIGH CHOLESTEROL: ICD-10-CM

## 2022-03-23 DIAGNOSIS — R35.0 URINARY FREQUENCY: ICD-10-CM

## 2022-03-23 PROBLEM — Z86.19 HISTORY OF GONORRHEA: Status: ACTIVE | Noted: 2022-03-23

## 2022-03-23 PROBLEM — Z86.19 HISTORY OF TRICHOMONIASIS: Status: ACTIVE | Noted: 2022-03-23

## 2022-03-23 LAB
CANDIDA DNA VAG QL PROBE+SIG AMP: NEGATIVE
G VAGINALIS DNA VAG QL PROBE+SIG AMP: NEGATIVE
T VAGINALIS DNA VAG QL PROBE+SIG AMP: POSITIVE

## 2022-03-23 PROCEDURE — 87660 TRICHOMONAS VAGIN DIR PROBE: CPT

## 2022-03-23 PROCEDURE — 87510 GARDNER VAG DNA DIR PROBE: CPT

## 2022-03-23 PROCEDURE — 87491 CHLMYD TRACH DNA AMP PROBE: CPT

## 2022-03-23 PROCEDURE — 99213 OFFICE O/P EST LOW 20 MIN: CPT | Performed by: NURSE PRACTITIONER

## 2022-03-23 PROCEDURE — 87591 N.GONORRHOEAE DNA AMP PROB: CPT

## 2022-03-23 PROCEDURE — 87480 CANDIDA DNA DIR PROBE: CPT

## 2022-03-23 ASSESSMENT — FIBROSIS 4 INDEX: FIB4 SCORE: 0.71

## 2022-03-23 NOTE — PROGRESS NOTES
HPI Comments:  Kyara Jones is a 31 y.o. y.o. female who presents for problem gyn visit: repeat STI testing. Pt has a return of symptoms that she had prior to treatment for GC and trich. She saw the same sexual partner a few weeks ago who she believes gave her these infections and he said he got treated but she's not sure if he did, and they were drunk and did not use protection. She wants to get restested. She is also not have urinary frequency.     Review of Systems :  Constitutional: Denies any issues  EENT: Denies any issues  Cardio: Denies any issues  Resp: Denies any issues  GI: Denies any issues  : Reports urinary frequency  Pertinent positives documented in HPI and all other systems reviewed & are negative    All PMH, PSH, allergies, social history and FH reviewed and updated today:  Past Medical History:   Diagnosis Date   • Elevated triglycerides  3/23/2022     Past Surgical History:   Procedure Laterality Date   • REPEAT C SECTION  10/21/2020    Procedure:  SECTION, REPEAT;  Surgeon: Rashmi Coles M.D.;  Location: LABOR AND DELIVERY;  Service: Obstetrics   • PRIMARY C SECTION  2017    Procedure: PRIMARY C SECTION;  Surgeon: Keke Huntley M.D.;  Location: LABOR AND DELIVERY;  Service:    • DILATION AND EVACUATION N/A 2016    Procedure: DILATION AND EVACUATION;  Surgeon: Joe Ramirez M.D.;  Location: SURGERY SAME DAY [facility];  Service:      Patient has no known allergies.  Social History     Socioeconomic History   • Marital status: Single   Tobacco Use   • Smoking status: Former Smoker     Packs/day: 0.25     Years: 4.00     Pack years: 1.00     Types: Cigarettes     Quit date: 2017     Years since quittin.9   • Smokeless tobacco: Never Used   Vaping Use   • Vaping Use: Never used   Substance and Sexual Activity   • Alcohol use: No     Comment: occ   • Drug use: No   • Sexual activity: Yes     Partners: Male     Family History   Problem Relation  Age of Onset   • Arthritis Paternal Grandmother    • Hypertension Paternal Grandmother      Medications:   No current [facility]-ordered outpatient medications on file.     Current Facility-Administered Medications Ordered in Epic   Medication Dose Route Frequency Provider Last Rate Last Admin   • medroxyPROGESTERone (DEPO-PROVERA) injection 150 mg  150 mg Intramuscular Q90 DAYS Keke Huntley M.D.   150 mg at 01/07/22 1018          Objective:   Vital measurements:  /72   Wt 88.8 kg (195 lb 12.8 oz)   Body mass index is 31.6 kg/m². (Goal BM I>18 <25)    Physical Exam   Nursing note and vitals reviewed.  Constitutional: She is oriented to person, place, and time. She appears well-developed and well-nourished. No distress.     Abdominal: Soft. Bowel sounds are normal. She exhibits no distension and no mass. No tenderness. She has no rebound and no guarding.     Genitourinary:  Vaginal swabs self-collected per pt preference     Neurological: She is alert and oriented to person, place, and time. She exhibits normal muscle tone.     Skin: Skin is warm and dry. No rash noted. She is not diaphoretic. No erythema. No pallor.     Psychiatric: She has a normal mood and affect. Her behavior is normal. Judgment and thought content normal.        Assessment:     1. Vitamin D deficiency  CBC WITH DIFFERENTIAL   2. Elevated triglycerides      3. History of trichomoniasis     4. History of gonorrhea     5. Urinary frequency  URINE CULTURE(NEW)   6. Leukocytosis, unspecified type  Referral to establish with [facility] PCP         Plan:   Gc/ct and Vag path collected  STI testing by blood WNL  Reviewed need for vitamin D supplementation  Reviewed elevated triglycerides and WBCs: to follow up with PCP for this as elevated WBCs is a chronic issue for her. Info given on where to establish   Urine culture ordered   She is due for her depo next month     Return if symptoms worsen or fail to improve.

## 2022-03-23 NOTE — PROGRESS NOTES
Patient here for a GYN follow up.   Last seen on  02/11/2022 for rocephin injection  C/o patient is here for STD testing. Patient states that she is still having an odor. Patient states that she was still having some itching and she states she has been having frequency.  pharmacy verified.  Patient phone #: [phone]

## 2022-03-24 LAB
C TRACH DNA GENITAL QL NAA+PROBE: NEGATIVE
N GONORRHOEA DNA GENITAL QL NAA+PROBE: POSITIVE
SPECIMEN SOURCE: ABNORMAL

## 2022-03-25 ENCOUNTER — TELEPHONE (OUTPATIENT)
Dept: OBGYN | Facility: CLINIC | Age: 32
End: 2022-03-25
Payer: MEDICAID

## 2022-03-25 RX ORDER — METRONIDAZOLE 500 MG/1
500 TABLET ORAL ONCE
Qty: 4 TABLET | Refills: 0 | Status: SHIPPED | OUTPATIENT
Start: 2022-03-25 | End: 2022-03-25

## 2022-03-25 NOTE — TELEPHONE ENCOUNTER
LVMTCB  ----- Message from TONJA Cox sent at 3/25/2022  8:11 AM PDT -----  As expected pt is positive for the same STIs that she was treated for a month ago due to being re exposed to the same partner. Please have her come in for ceftriaxone and I am sending in Trich treatment.

## 2022-03-28 ENCOUNTER — NON-PROVIDER VISIT (OUTPATIENT)
Dept: OBGYN | Facility: CLINIC | Age: 32
End: 2022-03-28
Payer: COMMERCIAL

## 2022-03-28 DIAGNOSIS — Z86.19 HISTORY OF GONORRHEA: ICD-10-CM

## 2022-03-28 PROCEDURE — 96372 THER/PROPH/DIAG INJ SC/IM: CPT | Performed by: PHYSICIAN ASSISTANT

## 2022-03-28 NOTE — NON-PROVIDER
Patient here for Rocephin injection  States she already took other medication.  Phone number:[phone]  Pharmacy verified

## 2022-05-02 ENCOUNTER — NON-PROVIDER VISIT (OUTPATIENT)
Dept: OBGYN | Facility: CLINIC | Age: 32
End: 2022-05-02
Payer: MEDICAID

## 2022-05-02 DIAGNOSIS — Z30.42 DEPO-PROVERA CONTRACEPTIVE STATUS: Primary | ICD-10-CM

## 2022-05-02 LAB
INT CON NEG: NEGATIVE
INT CON POS: POSITIVE
POC URINE PREGNANCY TEST: NEGATIVE

## 2022-05-02 PROCEDURE — 81025 URINE PREGNANCY TEST: CPT | Performed by: NURSE PRACTITIONER

## 2022-05-02 PROCEDURE — 96372 THER/PROPH/DIAG INJ SC/IM: CPT

## 2022-05-02 RX ORDER — MEDROXYPROGESTERONE ACETATE 150 MG/ML
150 INJECTION, SUSPENSION INTRAMUSCULAR ONCE
Status: COMPLETED | OUTPATIENT
Start: 2022-05-02 | End: 2022-05-02

## 2022-05-02 RX ADMIN — MEDROXYPROGESTERONE ACETATE 150 MG: 150 INJECTION, SUSPENSION INTRAMUSCULAR at 09:18

## 2022-05-02 NOTE — NON-PROVIDER
Pt here for DEPO injection.   Pregnancy test needed? Yes,   Reminder card: Yes  Next dose due on 7/18/22-8/1/22  Given in the RIGHTDELTOID

## 2022-05-20 ENCOUNTER — APPOINTMENT (OUTPATIENT)
Dept: MEDICAL GROUP | Facility: CLINIC | Age: 32
End: 2022-05-20
Payer: MEDICAID

## 2022-07-26 ENCOUNTER — NON-PROVIDER VISIT (OUTPATIENT)
Dept: OBGYN | Facility: CLINIC | Age: 32
End: 2022-07-26
Payer: MEDICAID

## 2022-07-26 DIAGNOSIS — Z30.42 ON DEPO-PROVERA FOR CONTRACEPTION: ICD-10-CM

## 2022-07-26 PROCEDURE — 96372 THER/PROPH/DIAG INJ SC/IM: CPT | Performed by: ADVANCED PRACTICE MIDWIFE

## 2022-07-26 RX ADMIN — MEDROXYPROGESTERONE ACETATE 150 MG: 150 INJECTION, SUSPENSION INTRAMUSCULAR at 13:23

## 2022-07-26 NOTE — PROGRESS NOTES
Patient here for Depo provera injection.   within range. No UPT needed  Next dose due 10/11/22 to 10/25/2022

## 2022-08-31 ENCOUNTER — HOSPITAL ENCOUNTER (OUTPATIENT)
Dept: LAB | Facility: MEDICAL CENTER | Age: 32
End: 2022-08-31
Attending: NURSE PRACTITIONER
Payer: MEDICAID

## 2022-08-31 DIAGNOSIS — R35.0 URINARY FREQUENCY: ICD-10-CM

## 2022-08-31 DIAGNOSIS — E55.9 VITAMIN D DEFICIENCY: ICD-10-CM

## 2022-08-31 LAB
BASOPHILS # BLD AUTO: 0.6 % (ref 0–1.8)
BASOPHILS # BLD: 0.06 K/UL (ref 0–0.12)
EOSINOPHIL # BLD AUTO: 0.27 K/UL (ref 0–0.51)
EOSINOPHIL NFR BLD: 2.7 % (ref 0–6.9)
ERYTHROCYTE [DISTWIDTH] IN BLOOD BY AUTOMATED COUNT: 39.8 FL (ref 35.9–50)
HCT VFR BLD AUTO: 40.9 % (ref 37–47)
HGB BLD-MCNC: 14.1 G/DL (ref 12–16)
IMM GRANULOCYTES # BLD AUTO: 0.02 K/UL (ref 0–0.11)
IMM GRANULOCYTES NFR BLD AUTO: 0.2 % (ref 0–0.9)
LYMPHOCYTES # BLD AUTO: 2.63 K/UL (ref 1–4.8)
LYMPHOCYTES NFR BLD: 26.3 % (ref 22–41)
MCH RBC QN AUTO: 31.2 PG (ref 27–33)
MCHC RBC AUTO-ENTMCNC: 34.5 G/DL (ref 33.6–35)
MCV RBC AUTO: 90.5 FL (ref 81.4–97.8)
MONOCYTES # BLD AUTO: 0.6 K/UL (ref 0–0.85)
MONOCYTES NFR BLD AUTO: 6 % (ref 0–13.4)
NEUTROPHILS # BLD AUTO: 6.42 K/UL (ref 2–7.15)
NEUTROPHILS NFR BLD: 64.2 % (ref 44–72)
NRBC # BLD AUTO: 0 K/UL
NRBC BLD-RTO: 0 /100 WBC
PLATELET # BLD AUTO: 285 K/UL (ref 164–446)
PMV BLD AUTO: 10.5 FL (ref 9–12.9)
RBC # BLD AUTO: 4.52 M/UL (ref 4.2–5.4)
WBC # BLD AUTO: 10 K/UL (ref 4.8–10.8)

## 2022-08-31 PROCEDURE — 87077 CULTURE AEROBIC IDENTIFY: CPT

## 2022-08-31 PROCEDURE — 87186 SC STD MICRODIL/AGAR DIL: CPT

## 2022-08-31 PROCEDURE — 87086 URINE CULTURE/COLONY COUNT: CPT

## 2022-08-31 PROCEDURE — 36415 COLL VENOUS BLD VENIPUNCTURE: CPT

## 2022-08-31 PROCEDURE — 85025 COMPLETE CBC W/AUTO DIFF WBC: CPT

## 2022-09-03 LAB
BACTERIA UR CULT: ABNORMAL
BACTERIA UR CULT: ABNORMAL
SIGNIFICANT IND 70042: ABNORMAL
SITE SITE: ABNORMAL
SOURCE SOURCE: ABNORMAL

## 2022-09-06 PROBLEM — N39.0 UTI (URINARY TRACT INFECTION): Status: ACTIVE | Noted: 2022-09-06

## 2022-09-06 RX ORDER — SULFAMETHOXAZOLE AND TRIMETHOPRIM 800; 160 MG/1; MG/1
1 TABLET ORAL 2 TIMES DAILY
Qty: 14 TABLET | Refills: 0 | Status: SHIPPED | OUTPATIENT
Start: 2022-09-06 | End: 2022-10-21

## 2022-09-07 ENCOUNTER — TELEPHONE (OUTPATIENT)
Dept: OBGYN | Facility: CLINIC | Age: 32
End: 2022-09-07
Payer: MEDICAID

## 2022-09-07 NOTE — TELEPHONE ENCOUNTER
----- Message from TONJA Cox sent at 9/6/2022  9:13 PM PDT -----  Let pt know she has a UTI, I have sent in a anbx but if she has any symptoms and they dont get better need to do another urine culture, she is not pregnant.     09/07/22  2:31 PM  Informed patient of above instructions. Confirmed pharmacy. Patient is going to  rx. Will call as needed

## 2022-09-22 ENCOUNTER — TELEPHONE (OUTPATIENT)
Dept: OBGYN | Facility: CLINIC | Age: 32
End: 2022-09-22
Payer: MEDICAID

## 2022-09-22 NOTE — TELEPHONE ENCOUNTER
Patient called in stating that she has been having some irregular bleeding around the time that she is due for her next depo-provera injection. Informed patient that this can occur sometimes, and that she can come in for an appointment to discuss this with one of our providers. Patient agreeable to appointment and was scheduled for appt tomorrow at 2:45. No further questions.

## 2022-10-12 ENCOUNTER — NON-PROVIDER VISIT (OUTPATIENT)
Dept: OBGYN | Facility: CLINIC | Age: 32
End: 2022-10-12
Payer: MEDICAID

## 2022-10-12 VITALS — WEIGHT: 194.5 LBS | BODY MASS INDEX: 31.39 KG/M2

## 2022-10-12 DIAGNOSIS — Z30.42 ENCOUNTER FOR DEPO-PROVERA CONTRACEPTION: ICD-10-CM

## 2022-10-12 PROCEDURE — 96372 THER/PROPH/DIAG INJ SC/IM: CPT | Performed by: PHYSICIAN ASSISTANT

## 2022-10-12 RX ORDER — MEDROXYPROGESTERONE ACETATE 150 MG/ML
150 INJECTION, SUSPENSION INTRAMUSCULAR ONCE
Status: COMPLETED | OUTPATIENT
Start: 2022-10-12 | End: 2022-10-12

## 2022-10-12 RX ADMIN — MEDROXYPROGESTERONE ACETATE 150 MG: 150 INJECTION, SUSPENSION INTRAMUSCULAR at 14:16

## 2022-10-12 ASSESSMENT — FIBROSIS 4 INDEX: FIB4 SCORE: 0.66

## 2022-10-12 NOTE — PROGRESS NOTES
Patient here for a GYN follow up.   Last seen on 2022  No complaints as of today   pharmacy verified.  Patient phone #: [phone]    NDC: 9955-9202-83  LOT#: [id number]  Expiration Date: 2024  Dose: 150mg  Site: Left Deltoid  Patient educated on use and side effects of medication. Name and  verified prior to injection. Pt tolerated? Well   Verified by MR  Administered by Shamika Contreras Med Ass't at 1:01 PM.  Patient Provided Medication: no  Next shot due 2022-

## 2022-10-20 ENCOUNTER — HOSPITAL ENCOUNTER (OUTPATIENT)
Facility: MEDICAL CENTER | Age: 32
End: 2022-10-20
Attending: NURSE PRACTITIONER
Payer: MEDICAID

## 2022-10-20 ENCOUNTER — GYNECOLOGY VISIT (OUTPATIENT)
Dept: OBGYN | Facility: CLINIC | Age: 32
End: 2022-10-20
Payer: MEDICAID

## 2022-10-20 VITALS — SYSTOLIC BLOOD PRESSURE: 116 MMHG | BODY MASS INDEX: 32.18 KG/M2 | DIASTOLIC BLOOD PRESSURE: 76 MMHG | WEIGHT: 199.4 LBS

## 2022-10-20 DIAGNOSIS — Z11.3 ROUTINE SCREENING FOR STI (SEXUALLY TRANSMITTED INFECTION): ICD-10-CM

## 2022-10-20 PROCEDURE — 99213 OFFICE O/P EST LOW 20 MIN: CPT | Performed by: NURSE PRACTITIONER

## 2022-10-20 PROCEDURE — 87660 TRICHOMONAS VAGIN DIR PROBE: CPT

## 2022-10-20 PROCEDURE — 87510 GARDNER VAG DNA DIR PROBE: CPT

## 2022-10-20 PROCEDURE — 87491 CHLMYD TRACH DNA AMP PROBE: CPT

## 2022-10-20 PROCEDURE — 87591 N.GONORRHOEAE DNA AMP PROB: CPT

## 2022-10-20 PROCEDURE — 87480 CANDIDA DNA DIR PROBE: CPT

## 2022-10-20 ASSESSMENT — FIBROSIS 4 INDEX: FIB4 SCORE: 0.66

## 2022-10-20 NOTE — PROGRESS NOTES
HPI Comments:  Kyara Jones is a 31 y.o. y.o. female who presents for problem gyn visit: STI testing. Pt has no complaints. No LMP recorded.      Kyara is here today for STI testing. She is a  with hx of c/s x 2. She is currently on depo provera for BCM.  She is currently sexually active with 1 male partner. States she is using condoms most of the time for STI prevention, but has had some unprotected intercourse.   Denies any discharge, itching, or burning. Pap is up to date.  Hx of trich and gonorrhea (has been treated)- will retest today    Review of Systems :  Constitutional: neg, alert and oriented x 4  EENT: neg  Cardio: neg  Resp: neg  GI: neg  : neg  Pertinent positives documented in HPI and all other systems reviewed & are negative    All PMH, PSH, allergies, social history and FH reviewed and updated today:  Past Medical History:   Diagnosis Date    Elevated triglycerides  3/23/2022     Past Surgical History:   Procedure Laterality Date    REPEAT C SECTION  10/21/2020    Procedure:  SECTION, REPEAT;  Surgeon: Rashmi Coles M.D.;  Location: LABOR AND DELIVERY;  Service: Obstetrics    PRIMARY C SECTION  2017    Procedure: PRIMARY C SECTION;  Surgeon: Keke Huntley M.D.;  Location: LABOR AND DELIVERY;  Service:     DILATION AND EVACUATION N/A 2016    Procedure: DILATION AND EVACUATION;  Surgeon: Joe Ramirez M.D.;  Location: SURGERY SAME DAY [facility];  Service:      Patient has no known allergies.  Social History     Socioeconomic History    Marital status: Single   Tobacco Use    Smoking status: Former     Packs/day: 0.25     Years: 4.00     Pack years: 1.00     Types: Cigarettes     Quit date: 2017     Years since quittin.5    Smokeless tobacco: Never   Vaping Use    Vaping Use: Never used   Substance and Sexual Activity    Alcohol use: No     Comment: occ    Drug use: No    Sexual activity: Yes     Partners: Male     Family History   Problem  Relation Age of Onset    Arthritis Paternal Grandmother     Hypertension Paternal Grandmother      Medications:   Current Outpatient Medications Ordered in Epic   Medication Sig Dispense Refill    sulfamethoxazole-trimethoprim (BACTRIM DS) 800-160 MG tablet Take 1 Tablet by mouth 2 times a day. 14 Tablet 0    metroNIDAZOLE (FLAGYL) 500 MG Tab TAKE 4 TABLETS BY MOUTH ONCE       Current Facility-Administered Medications Ordered in Epic   Medication Dose Route Frequency Provider Last Rate Last Admin    medroxyPROGESTERone (DEPO-PROVERA) injection 150 mg  150 mg Intramuscular Q90 DAYS Keke Huntley M.D.   150 mg at 07/26/22 1323          Objective:   Vital measurements:  /76   Wt 199 lb 6.4 oz   Body mass index is 32.18 kg/m². (Goal BM I>18 <25)    Physical Exam   Nursing note and vitals reviewed.  Constitutional: She is oriented to person, place, and time. She appears well-developed and well-nourished. No distress.     Abdominal: Soft. Bowel sounds are normal. She exhibits no distension and no mass. No tenderness. She has no rebound and no guarding.     Breast:  Inspection negative. No nipple discharge or bleeding    Genitourinary:  Pelvic exam was performed with patient supine.  External genitalia with no abnormal pigmentation, labial fusion,rash, tenderness, lesion or injury to the labia bilaterally.  Vagina is moist with no lesions, foul discharge, erythema, tenderness or bleeding. No foreign body around the vagina or signs of injury.   Cervix exhibits no motion tenderness, no discharge and no friability.   Uterus is mid-position, not deviated, not enlarged, not fixed and not tender.  Right adnexum displays no mass, no tenderness and no fullness. Left adnexum displays no mass, no tenderness and no fullness.     Neurological: She is alert and oriented to person, place, and time. She exhibits normal muscle tone.     Skin: Skin is warm and dry. No rash noted. She is not diaphoretic. No erythema. No pallor.      Psychiatric: She has a normal mood and affect. Her behavior is normal. Judgment and thought content normal.        Assessment:     1. Routine screening for STI (sexually transmitted infection)  VAGINAL PATHOGENS DNA PANEL    Chlamydia/GC, PCR (Urine)            Plan:   Gyn exam performed   Monthly SBE.  Counseling: breast self exam, STD prevention, use and side effects of OCPs, and family planning choices  Encourage exercise and proper diet.  See medications and orders placed in encounter report.    No follow-ups on file.     Will call with abnormal results  Follow up annually or sooner BARAK LIRAM, APRN

## 2022-10-20 NOTE — PROGRESS NOTES
Patient here for GYN follow up for STD testing  Last seen on: 3/23/2022  Pt states no complaints.

## 2022-10-21 ENCOUNTER — OFFICE VISIT (OUTPATIENT)
Dept: MEDICAL GROUP | Facility: CLINIC | Age: 32
End: 2022-10-21
Payer: MEDICAID

## 2022-10-21 VITALS
RESPIRATION RATE: 16 BRPM | OXYGEN SATURATION: 95 % | WEIGHT: 198 LBS | HEIGHT: 66 IN | SYSTOLIC BLOOD PRESSURE: 115 MMHG | DIASTOLIC BLOOD PRESSURE: 76 MMHG | HEART RATE: 75 BPM | BODY MASS INDEX: 31.82 KG/M2

## 2022-10-21 DIAGNOSIS — Z00.00 PREVENTATIVE HEALTH CARE: ICD-10-CM

## 2022-10-21 LAB
C TRACH DNA GENITAL QL NAA+PROBE: NEGATIVE
CANDIDA DNA VAG QL PROBE+SIG AMP: NEGATIVE
G VAGINALIS DNA VAG QL PROBE+SIG AMP: NEGATIVE
N GONORRHOEA DNA GENITAL QL NAA+PROBE: NEGATIVE
SPECIMEN SOURCE: NORMAL
T VAGINALIS DNA VAG QL PROBE+SIG AMP: NEGATIVE

## 2022-10-21 PROCEDURE — 99395 PREV VISIT EST AGE 18-39: CPT | Mod: GE,EP | Performed by: STUDENT IN AN ORGANIZED HEALTH CARE EDUCATION/TRAINING PROGRAM

## 2022-10-21 ASSESSMENT — FIBROSIS 4 INDEX: FIB4 SCORE: 0.66

## 2022-10-21 NOTE — PROGRESS NOTES
"Subjective:     CC: F/u lab results    HPI:   Kyara presents today to discuss her lab work that was ordered by several midwives at [facility] earlier this year.  She was recently screened for STDs in the last week, so has no concerns around that.  Patient is sexually active with 1 new male partner as of 1 month ago.    Patient lives with her 2 children, her 2-year-old and a 5-year-old, and works as a caregiver.  She drinks alcohol socially on the weekends and is recently quit vaping.  She smokes cannabis only occasionally.    For contraception, patient is doing the Depo shot and is very happy with it.  She is not interested in any LARCs at this time.    Problem   Preventative Health Care       No current [facility]-ordered outpatient medications on file.     Current Facility-Administered Medications Ordered in Epic   Medication Dose Route Frequency Provider Last Rate Last Admin    medroxyPROGESTERone (DEPO-PROVERA) injection 150 mg  150 mg Intramuscular Q90 DAYS Keke Huntley M.D.   150 mg at 07/26/22 1323         Objective:     Exam:  /76 (BP Location: Right arm, Patient Position: Sitting, BP Cuff Size: Adult)   Pulse 75   Resp 16   Ht 1.676 m (5' 6\")   Wt 89.8 kg (198 lb)   SpO2 95%   BMI 31.96 kg/m²  Body mass index is 31.96 kg/m².    General: Normal appearing. No distress.  HEENT: Normocephalic. Eyes conjunctiva clear lids without ptosis, pupils equal and reactive to light accommodation, ears normal shape and contour, canals are clear bilaterally, tympanic membranes are benign, nasal mucosa benign, oropharynx is without erythema, edema or exudates.   Neck: Supple without JVD or bruit. Thyroid is not enlarged.  Pulmonary: Clear to ausculation.  Normal effort. No rales, ronchi, or wheezing.  Cardiovascular: Regular rate and rhythm without murmur.   Abdomen: Soft, nontender, nondistended. Normal bowel sounds. Liver and spleen are not palpable  Neurologic: Grossly nonfocal  Lymph: No cervical or " supraclavicular lymph nodes are palpable  Skin: Warm and dry.  No obvious lesions.  Musculoskeletal: Normal gait. No extremity cyanosis, clubbing, or edema.  Psych: Normal mood and affect. Alert and oriented x3. Judgment and insight is normal.      Assessment & Plan:     31 y.o. female with the following -     Problem List Items Addressed This Visit       Preventative health care     31-year-old female with no past medical history here to review recent lab work.  Her vital signs are normal, her BMI is 32.  Patient does not desire to lose weight.  She is active and eats healthily.  Recent blood work obtained in August and March of this year is normal with the exception of a mildly elevated triglycerides and low vitamin D.  For other screenings, she is up-to-date on her Pap smears.  Patient has no further complaints today and will reach out as needed.    No follow-ups on file.    Marely Prado MD   PGY-3

## 2023-01-11 ENCOUNTER — NON-PROVIDER VISIT (OUTPATIENT)
Dept: OBGYN | Facility: CLINIC | Age: 33
End: 2023-01-11
Payer: MEDICAID

## 2023-01-11 DIAGNOSIS — Z32.02 PREGNANCY EXAMINATION OR TEST, NEGATIVE RESULT: ICD-10-CM

## 2023-01-11 DIAGNOSIS — Z30.42 ENCOUNTER FOR DEPO-PROVERA CONTRACEPTION: ICD-10-CM

## 2023-01-11 LAB
INT CON NEG: NEGATIVE
INT CON POS: POSITIVE
POC URINE PREGNANCY TEST: NEGATIVE

## 2023-01-11 PROCEDURE — 81025 URINE PREGNANCY TEST: CPT

## 2023-01-11 PROCEDURE — 96372 THER/PROPH/DIAG INJ SC/IM: CPT

## 2023-01-11 RX ADMIN — MEDROXYPROGESTERONE ACETATE 150 MG: 150 INJECTION, SUSPENSION INTRAMUSCULAR at 16:03

## 2023-01-11 NOTE — PROGRESS NOTES
Pt here today for Depo-provera injection  LMP: unknown, pt states has been having off and on bleeding x 3 weeks. Advised pt if she is concerned about the bleeding she can always make an appt with one of our providers for a consult. Pt states the bleeding starts when she is almost due for her next shot.   Negative UPT today, done in clinic. Consent signed  Good # [phone]    Depo-provera injection administered today 01/11/2023  Left Deltoid  Next Depo-provera injection will be due:March 29 - April 12 (2023)  Pt notified and reminder card given today

## 2023-03-15 ENCOUNTER — HOSPITAL ENCOUNTER (OUTPATIENT)
Facility: MEDICAL CENTER | Age: 33
End: 2023-03-15
Attending: STUDENT IN AN ORGANIZED HEALTH CARE EDUCATION/TRAINING PROGRAM
Payer: MEDICAID

## 2023-03-15 ENCOUNTER — HOSPITAL ENCOUNTER (OUTPATIENT)
Dept: LAB | Facility: MEDICAL CENTER | Age: 33
End: 2023-03-15
Attending: STUDENT IN AN ORGANIZED HEALTH CARE EDUCATION/TRAINING PROGRAM
Payer: MEDICAID

## 2023-03-15 ENCOUNTER — GYNECOLOGY VISIT (OUTPATIENT)
Dept: OBGYN | Facility: CLINIC | Age: 33
End: 2023-03-15
Payer: MEDICAID

## 2023-03-15 VITALS — BODY MASS INDEX: 31.96 KG/M2 | SYSTOLIC BLOOD PRESSURE: 98 MMHG | DIASTOLIC BLOOD PRESSURE: 60 MMHG | WEIGHT: 198 LBS

## 2023-03-15 DIAGNOSIS — Z91.89 AT RISK FOR SEXUALLY TRANSMITTED DISEASE DUE TO UNPROTECTED SEX: ICD-10-CM

## 2023-03-15 DIAGNOSIS — Z12.4 SCREENING FOR MALIGNANT NEOPLASM OF CERVIX: ICD-10-CM

## 2023-03-15 DIAGNOSIS — Z86.19 HISTORY OF GONORRHEA: ICD-10-CM

## 2023-03-15 DIAGNOSIS — Z86.19 HISTORY OF TRICHOMONIASIS: ICD-10-CM

## 2023-03-15 LAB
HBV SURFACE AG SER QL: NORMAL
HCV AB SER QL: NORMAL
HIV 1+2 AB+HIV1 P24 AG SERPL QL IA: NORMAL
T PALLIDUM AB SER QL IA: NORMAL

## 2023-03-15 PROCEDURE — 87491 CHLMYD TRACH DNA AMP PROBE: CPT

## 2023-03-15 PROCEDURE — 87480 CANDIDA DNA DIR PROBE: CPT

## 2023-03-15 PROCEDURE — 87624 HPV HI-RISK TYP POOLED RSLT: CPT

## 2023-03-15 PROCEDURE — 88175 CYTOPATH C/V AUTO FLUID REDO: CPT

## 2023-03-15 PROCEDURE — 99213 OFFICE O/P EST LOW 20 MIN: CPT | Mod: 25 | Performed by: STUDENT IN AN ORGANIZED HEALTH CARE EDUCATION/TRAINING PROGRAM

## 2023-03-15 PROCEDURE — 87510 GARDNER VAG DNA DIR PROBE: CPT

## 2023-03-15 PROCEDURE — 87660 TRICHOMONAS VAGIN DIR PROBE: CPT

## 2023-03-15 PROCEDURE — 87340 HEPATITIS B SURFACE AG IA: CPT

## 2023-03-15 PROCEDURE — 86780 TREPONEMA PALLIDUM: CPT

## 2023-03-15 PROCEDURE — 86803 HEPATITIS C AB TEST: CPT

## 2023-03-15 PROCEDURE — 87591 N.GONORRHOEAE DNA AMP PROB: CPT

## 2023-03-15 PROCEDURE — 36415 COLL VENOUS BLD VENIPUNCTURE: CPT

## 2023-03-15 PROCEDURE — 87389 HIV-1 AG W/HIV-1&-2 AB AG IA: CPT

## 2023-03-15 ASSESSMENT — FIBROSIS 4 INDEX: FIB4 SCORE: 0.68

## 2023-03-15 NOTE — PROGRESS NOTES
Pt here for full panel of STD testing  Pt states she is still having bleeding from Depo  [phone]  Pharmacy verified

## 2023-03-15 NOTE — PROGRESS NOTES
GYN PROBLEM VISIT    CC:  Gynecologic Exam       HPI: Patient is a 32 y.o.  on depo provera for contraception who presents for STI testing.  Reports unprotected intercourse with new partner at the end of January.  Denies any vaginal discharge or itching.  She does note irregular bleeding and spotting after sex, is not sure if this is related to her Depo or not.  She is due for her next injection Mar 29.  Denies any pelvic pain. Patient also due for pap.       ROS:   General: denies fever / chills  HEENT: denies sore throat:  CV: denies chest pain:  Repiratory: denies shortness of breath  GI: denies abdominal pain  : denies dysuria:    PFSH:  I personally reviewed the past medical and surgical histories.     Social History     Tobacco Use    Smoking status: Former     Packs/day: 0.25     Years: 4.00     Pack years: 1.00     Types: Cigarettes     Quit date: 2017     Years since quittin.9    Smokeless tobacco: Never   Vaping Use    Vaping Use: Never used   Substance Use Topics    Alcohol use: No     Comment: occ    Drug use: No       Social History     Substance and Sexual Activity   Sexual Activity Yes    Partners: Male        ALLERGIES / REACTIONS:  No Known Allergies                        PHYSICAL EXAMINATION:  Vital Signs:   Vitals:    03/15/23 1042   BP: 98/60   Weight: 198 lb     Body mass index is 31.96 kg/m².    Gen: appears well, NAD  Respiratory: normal effort  Abdomen: Soft, non-tender.  Pelvic Exam:    Vulva: normal.    Urethra: normal.   Vagina: normal.    Cervix: normal, friable cervix within os, no CMT   Uterus: normal size, shape and contour.    left adnexa: normal.   right adnexa: normal.   Perineum: normal.    ASSESSMENT AND PLAN:  32 y.o.    1. At risk for sexually transmitted disease due to unprotected sex  -- pelvic exam overall benign  -- swabs collected  -- pap collected  -- given order slip for STI blood work panel  -- will contact patient and prescribe treatment if  results positive  -- all questions answered  -- recommend consistent condom use for STI prevention    - THINPREP PAP W/HPV AND CTNG; Future  - VAGINAL PATHOGENS DNA PANEL; Future  - HEP C VIRUS ANTIBODY; Future  - HIV AG/AB COMBO ASSAY SCREENING; Future  - HEP B SURFACE ANTIGEN; Future  - T.PALLIDUM AB ANTONIO (SCREENING); Future    2. History of trichomoniasis  -- see above    3. History of gonorrhea  -- see above        Kenny Milian DO, MONA

## 2023-03-16 DIAGNOSIS — Z91.89 AT RISK FOR SEXUALLY TRANSMITTED DISEASE DUE TO UNPROTECTED SEX: ICD-10-CM

## 2023-03-16 LAB
C TRACH DNA GENITAL QL NAA+PROBE: NEGATIVE
CANDIDA DNA VAG QL PROBE+SIG AMP: NEGATIVE
CYTOLOGY REG CYTOL: ABNORMAL
G VAGINALIS DNA VAG QL PROBE+SIG AMP: POSITIVE
HPV HR 12 DNA CVX QL NAA+PROBE: POSITIVE
HPV16 DNA SPEC QL NAA+PROBE: NEGATIVE
HPV18 DNA SPEC QL NAA+PROBE: NEGATIVE
N GONORRHOEA DNA GENITAL QL NAA+PROBE: NEGATIVE
SPECIMEN SOURCE: ABNORMAL
SPECIMEN SOURCE: ABNORMAL
T VAGINALIS DNA VAG QL PROBE+SIG AMP: NEGATIVE

## 2023-03-17 ENCOUNTER — TELEPHONE (OUTPATIENT)
Dept: OBGYN | Facility: CLINIC | Age: 33
End: 2023-03-17
Payer: MEDICAID

## 2023-03-17 DIAGNOSIS — B96.89 BACTERIAL VAGINITIS: ICD-10-CM

## 2023-03-17 DIAGNOSIS — N76.0 BACTERIAL VAGINITIS: ICD-10-CM

## 2023-03-17 RX ORDER — METRONIDAZOLE 500 MG/1
TABLET ORAL
Qty: 14 TABLET | Refills: 0 | Status: SHIPPED | OUTPATIENT
Start: 2023-03-17

## 2023-03-17 NOTE — TELEPHONE ENCOUNTER
"Pt called in and would like to follow-up with her Pap/Vaginal Pathogen test results that was done on 3/16/2023. Explained to Pt that the provider has to review her results first and we will notify her as soon as we can. Confirmed pharmacy on file. Pt understood and has no further concerns.    Called Pt and informed below message from the provider. Advise Pt that she need to come back in a year for a repeat pap test and that we had sent her Flagyl medication for positive BV. Pt understood and has no further concerns.    \"Her pap results aren't back yet - only her HPV results.  If her pap is normal, even though her HPV co-test was positive, she would require a repeat in 1 year.  If her pap is anything other than normal, she will require a colposcopy.  So I can't give her a definitive answer yet until the cytology results, which takes a few days longer than the HPV results.     I have prescribed her flagyl to her pharmacy for +BV. \"  "

## 2023-04-07 ENCOUNTER — NON-PROVIDER VISIT (OUTPATIENT)
Dept: OBGYN | Facility: CLINIC | Age: 33
End: 2023-04-07
Payer: MEDICAID

## 2023-04-07 DIAGNOSIS — Z30.42 DEPO-PROVERA CONTRACEPTIVE STATUS: ICD-10-CM

## 2023-04-07 DIAGNOSIS — Z23 NEED FOR HPV VACCINE: ICD-10-CM

## 2023-04-07 PROCEDURE — 96372 THER/PROPH/DIAG INJ SC/IM: CPT | Performed by: OBSTETRICS & GYNECOLOGY

## 2023-04-07 PROCEDURE — 90651 9VHPV VACCINE 2/3 DOSE IM: CPT | Performed by: OBSTETRICS & GYNECOLOGY

## 2023-04-07 PROCEDURE — 90471 IMMUNIZATION ADMIN: CPT | Performed by: OBSTETRICS & GYNECOLOGY

## 2023-04-07 RX ORDER — MEDROXYPROGESTERONE ACETATE 150 MG/ML
150 INJECTION, SUSPENSION INTRAMUSCULAR ONCE
Status: COMPLETED | OUTPATIENT
Start: 2023-04-07 | End: 2023-04-07

## 2023-04-07 RX ADMIN — MEDROXYPROGESTERONE ACETATE 150 MG: 150 INJECTION, SUSPENSION INTRAMUSCULAR at 15:10

## 2023-04-07 NOTE — PROGRESS NOTES
Pt here today for HPV vaccine and Depo-Provera injection  Phone # [phone] (home)   NDC: 2747-3602-92  LOT#: [id number]  Expiration Date: 2024  Dose: 150mg/mL  Site: Left Deltoid  Patient educated on use and side effects of medication. Name and  verified prior to injection. Pt tolerated? yes   Verified by SANTOSH  Administered by Vitaliy Skinner Ass't at 3:12 PM.  Patient Provided Medication: no  Next Depo due between -    HPV vaccine given today on R. Deltoid

## 2023-06-28 ENCOUNTER — NON-PROVIDER VISIT (OUTPATIENT)
Dept: OBGYN | Facility: CLINIC | Age: 33
End: 2023-06-28
Payer: MEDICAID

## 2023-06-28 DIAGNOSIS — Z23 NEED FOR HPV VACCINE: ICD-10-CM

## 2023-06-28 DIAGNOSIS — Z30.42 DEPO-PROVERA CONTRACEPTIVE STATUS: ICD-10-CM

## 2023-06-28 PROCEDURE — 90471 IMMUNIZATION ADMIN: CPT | Performed by: OBSTETRICS & GYNECOLOGY

## 2023-06-28 PROCEDURE — 90651 9VHPV VACCINE 2/3 DOSE IM: CPT | Performed by: OBSTETRICS & GYNECOLOGY

## 2023-06-28 PROCEDURE — 96372 THER/PROPH/DIAG INJ SC/IM: CPT | Performed by: PHYSICIAN ASSISTANT

## 2023-06-28 RX ORDER — MEDROXYPROGESTERONE ACETATE 150 MG/ML
150 INJECTION, SUSPENSION INTRAMUSCULAR ONCE
Status: COMPLETED | OUTPATIENT
Start: 2023-06-28 | End: 2023-06-28

## 2023-06-28 RX ADMIN — MEDROXYPROGESTERONE ACETATE 150 MG: 150 INJECTION, SUSPENSION INTRAMUSCULAR at 13:46

## 2023-06-28 NOTE — PROGRESS NOTES
Date of last Depo Provera Injection: 4/7/23   Current date within therapeutic range?:  Yes   Urine pregnancy test done (needed if out of date range): N/A  Date of office visit: 4/7/23   Date of last pap (if > 21 years old)/ GYN exam: 3/2023 w/ + HPV - pt notified and next pap due in 1 year per provider note.   Dx: Contraceptive use   Order and dose verified by: Roz Ly   Patient tolerated injection and no adverse effects were observed or reported:  Yes   # of Administrations remaining in MAR: 0   Next injection due between : 9/13 - 9/27 2023  L Deltoid       2nd HPV dose in vaccine series given today.   L deltoid .   Consent form signed at first dose.   Consent form reviewed verbally with pt - no changes noted since first dose.   Pt tolerated well.   Next/final dose in series should be sometime between 10/1 - 10/10 of 2023 . ( 6 months from initial dose in series)   Pt aware.     HPV Vaccine - Gardasil 9:   NDC : 6167-3826-09  Lot : [id number]  Exp : 11/9/2024

## 2023-09-25 ENCOUNTER — NON-PROVIDER VISIT (OUTPATIENT)
Dept: OBGYN | Facility: CLINIC | Age: 33
End: 2023-09-25
Payer: MEDICAID

## 2023-09-25 DIAGNOSIS — Z30.42 DEPO-PROVERA CONTRACEPTIVE STATUS: ICD-10-CM

## 2023-09-25 PROCEDURE — 96372 THER/PROPH/DIAG INJ SC/IM: CPT | Performed by: NURSE PRACTITIONER

## 2023-09-25 RX ORDER — MEDROXYPROGESTERONE ACETATE 150 MG/ML
150 INJECTION, SUSPENSION INTRAMUSCULAR ONCE
Status: COMPLETED | OUTPATIENT
Start: 2023-09-25 | End: 2023-09-25

## 2023-09-25 RX ADMIN — MEDROXYPROGESTERONE ACETATE 150 MG: 150 INJECTION, SUSPENSION INTRAMUSCULAR at 09:46

## 2023-09-25 NOTE — PROGRESS NOTES
Date of last Depo Provera Injection: 6/28/2023  Current date within therapeutic range?:  Yes  Urine pregnancy test done (needed if out of date range):   Date of office visit: 6/28/2023  Date of last pap (if > 21 years old)/ GYN exam:   Dx: Contraceptive use   Order and dose verified by: AML  Patient tolerated injection and no adverse effects were observed or reported:  Yes  # of Administrations remaining in MAR: 0  Next injection due between . 12/11/23-12/25/23  Given in the Left deltoid  Reminder Card given.

## 2023-12-22 ENCOUNTER — NON-PROVIDER VISIT (OUTPATIENT)
Dept: OBGYN | Facility: CLINIC | Age: 33
End: 2023-12-22
Payer: MEDICAID

## 2023-12-22 DIAGNOSIS — Z30.42 ENCOUNTER FOR DEPO-PROVERA CONTRACEPTION: ICD-10-CM

## 2023-12-22 DIAGNOSIS — Z23 NEED FOR HPV VACCINE: ICD-10-CM

## 2023-12-22 PROCEDURE — 90471 IMMUNIZATION ADMIN: CPT | Performed by: NURSE PRACTITIONER

## 2023-12-22 PROCEDURE — 96372 THER/PROPH/DIAG INJ SC/IM: CPT | Performed by: NURSE PRACTITIONER

## 2023-12-22 PROCEDURE — 90651 9VHPV VACCINE 2/3 DOSE IM: CPT | Performed by: NURSE PRACTITIONER

## 2023-12-22 RX ORDER — MEDROXYPROGESTERONE ACETATE 150 MG/ML
150 INJECTION, SUSPENSION INTRAMUSCULAR ONCE
Status: COMPLETED | OUTPATIENT
Start: 2023-12-22 | End: 2023-12-22

## 2023-12-22 RX ADMIN — MEDROXYPROGESTERONE ACETATE 150 MG: 150 INJECTION, SUSPENSION INTRAMUSCULAR at 10:05

## 2023-12-22 NOTE — PROGRESS NOTES
Pt here today for Depo-provera injection and HPV vaccine  LMP: Pt states she does not get periods with the Depo shot  Date of last Depo Provera Injection: 09/25/2023  Current date within therapeutic range?:  Yes  Urine pregnancy test done (needed if out of date range): not needed  Date of office visit:12/22/2023  Date of last pap (if > 21 years old)/ GYN exam: 03/15/2023 (pt informed she will need to repeat pap in one year from this last pap date due to +HPV, per provider's recommendation)  Dx: Contraceptive use   Order and dose verified by:   Patient tolerated injection and no adverse effects were observed or reported:    # of Administrations remaining in MAR:  Next injection due between: March 9 - March 23 (2024) pt informed and reminder card given    Good # [phone]    pt missed the 3rd dose of the Gardasil vaccine in 10/2023 (late 2 months).  consulted with Navya Morales CNM who stated pt was still ok to received vaccine today    Pt received 3rd series of the Gardasil vaccine today 12/22/2023. Consent signed.

## 2024-03-12 ENCOUNTER — APPOINTMENT (OUTPATIENT)
Dept: TELEHEALTH | Facility: TELEMEDICINE | Age: 34
End: 2024-03-12
Payer: MEDICAID

## 2024-03-13 ENCOUNTER — HOSPITAL ENCOUNTER (OUTPATIENT)
Facility: MEDICAL CENTER | Age: 34
End: 2024-03-13
Attending: NURSE PRACTITIONER
Payer: MEDICAID

## 2024-03-13 ENCOUNTER — HOSPITAL ENCOUNTER (OUTPATIENT)
Dept: LAB | Facility: MEDICAL CENTER | Age: 34
End: 2024-03-13
Attending: NURSE PRACTITIONER
Payer: MEDICAID

## 2024-03-13 ENCOUNTER — GYNECOLOGY VISIT (OUTPATIENT)
Dept: OBGYN | Facility: CLINIC | Age: 34
End: 2024-03-13
Payer: MEDICAID

## 2024-03-13 VITALS
SYSTOLIC BLOOD PRESSURE: 122 MMHG | DIASTOLIC BLOOD PRESSURE: 80 MMHG | WEIGHT: 242 LBS | HEIGHT: 66 IN | BODY MASS INDEX: 38.89 KG/M2

## 2024-03-13 DIAGNOSIS — R87.618 PAP SMEAR ABNORMALITY OF CERVIX/HUMAN PAPILLOMAVIRUS (HPV) POSITIVE: ICD-10-CM

## 2024-03-13 LAB
25(OH)D3 SERPL-MCNC: 15 NG/ML (ref 30–100)
ERYTHROCYTE [DISTWIDTH] IN BLOOD BY AUTOMATED COUNT: 38.9 FL (ref 35.9–50)
HAV IGM SERPL QL IA: NORMAL
HBV CORE IGM SER QL: NORMAL
HBV SURFACE AG SER QL: NORMAL
HCT VFR BLD AUTO: 45.1 % (ref 37–47)
HCV AB SER QL: NORMAL
HGB BLD-MCNC: 15.2 G/DL (ref 12–16)
HIV 1+2 AB+HIV1 P24 AG SERPL QL IA: NORMAL
MCH RBC QN AUTO: 30.5 PG (ref 27–33)
MCHC RBC AUTO-ENTMCNC: 33.7 G/DL (ref 32.2–35.5)
MCV RBC AUTO: 90.4 FL (ref 81.4–97.8)
PLATELET # BLD AUTO: 304 K/UL (ref 164–446)
PMV BLD AUTO: 10.7 FL (ref 9–12.9)
RBC # BLD AUTO: 4.99 M/UL (ref 4.2–5.4)
T PALLIDUM AB SER QL IA: NORMAL
TSH SERPL DL<=0.005 MIU/L-ACNC: 1.58 UIU/ML (ref 0.38–5.33)
WBC # BLD AUTO: 10 K/UL (ref 4.8–10.8)

## 2024-03-13 PROCEDURE — 87491 CHLMYD TRACH DNA AMP PROBE: CPT

## 2024-03-13 PROCEDURE — G0101 CA SCREEN;PELVIC/BREAST EXAM: HCPCS | Performed by: NURSE PRACTITIONER

## 2024-03-13 PROCEDURE — 87591 N.GONORRHOEAE DNA AMP PROB: CPT

## 2024-03-13 PROCEDURE — 87389 HIV-1 AG W/HIV-1&-2 AB AG IA: CPT

## 2024-03-13 PROCEDURE — 80074 ACUTE HEPATITIS PANEL: CPT

## 2024-03-13 PROCEDURE — 82306 VITAMIN D 25 HYDROXY: CPT

## 2024-03-13 PROCEDURE — 80061 LIPID PANEL: CPT

## 2024-03-13 PROCEDURE — 85027 COMPLETE CBC AUTOMATED: CPT

## 2024-03-13 PROCEDURE — 87625 HPV TYPES 16 & 18 ONLY: CPT | Mod: XU

## 2024-03-13 PROCEDURE — 86780 TREPONEMA PALLIDUM: CPT

## 2024-03-13 PROCEDURE — 36415 COLL VENOUS BLD VENIPUNCTURE: CPT

## 2024-03-13 PROCEDURE — 88175 CYTOPATH C/V AUTO FLUID REDO: CPT

## 2024-03-13 PROCEDURE — 84443 ASSAY THYROID STIM HORMONE: CPT

## 2024-03-13 PROCEDURE — 87624 HPV HI-RISK TYP POOLED RSLT: CPT

## 2024-03-13 PROCEDURE — 3079F DIAST BP 80-89 MM HG: CPT | Performed by: NURSE PRACTITIONER

## 2024-03-13 PROCEDURE — 3074F SYST BP LT 130 MM HG: CPT | Performed by: NURSE PRACTITIONER

## 2024-03-13 PROCEDURE — 80053 COMPREHEN METABOLIC PANEL: CPT

## 2024-03-13 RX ORDER — MEDROXYPROGESTERONE ACETATE 150 MG/ML
150 INJECTION, SUSPENSION INTRAMUSCULAR ONCE
Status: SHIPPED | OUTPATIENT
Start: 2024-03-13 | End: 2024-03-16

## 2024-03-13 ASSESSMENT — FIBROSIS 4 INDEX: FIB4 SCORE: 0.7

## 2024-03-13 NOTE — PROGRESS NOTES
Patient here for annual exam  Last pap done/result:  3/23 +HPV /getting one TODAY   LMP: pt does not remember   BCM:DEPO   Last mammogram, if applicable: NA  Phone number: [phone] (home)   Pharmacy verified    Date of last Depo Provera Injection: 12/22/23  Current date within therapeutic range?:  YES  Urine pregnancy test done (needed if out of date range): NA  Date of office visit:3/13/24  Date of last pap (if > 21 years old)/ GYN exam: TODAY  Dx: Contraceptive use   Order and dose verified by: MARY  Patient tolerated injection and no adverse effects were observed or reported:  NA  # of Administrations remaining in MAR: 0  Next injection due between . MAY 29TH- JUNE 12TH

## 2024-03-13 NOTE — PROGRESS NOTES
Kyara Jones is a 33 y.o. y.o. female who presents for her annual gynecological exam.       HPI Comments: Pt reports no issues at this time. She is here to follow up her pap and get STI screening.      Review of Systems:  Cardio: Denies any issues.   Respiratory: Denies any issues.   Constitutional:  Denies any issues.   : Alfredito any issues.   Abdominal: Denies any issues.   Psychosocial: Denies any issues.   EENT: Denies any issues.   Metabolic: Denies any issues.   Pertinent positives documented in HPI and all other systems reviewed & are negative.     Gynecological hx:   Last pap was  and normal with HPV + . No other hx of abnormal cervical cytology.     Denies any hx of STIs and was last tested for STIs .     No LMP recorded.. On the depo she sometimes spots but no regular period. Reports previously has had a regular periods every 28 days lasting 5 days and started menstruating at age 12.     Not currently sexually active, previously with sexual partner who identifies as a man. She has had a total of 15 sexual partners in lifetime. She is satisfied with her sexual experiences.     Reports does use birth control: depo.     Denies any history of breast issues, surgeries, cancer.     OB Hx:  - C/s x 2  - SAB with D&C    Denies any psychological hx including hospitalizations and psych medication.   Denies any hx of or current issues with interpersonal violence.   Denies any use of tobacco, alcohol, drugs.     All PMH, PSH, allergies, social history and FH reviewed and updated today:  Past Medical History:   Diagnosis Date    Elevated triglycerides  3/23/2022     Past Surgical History:   Procedure Laterality Date    REPEAT C SECTION  10/21/2020    Procedure:  SECTION, REPEAT;  Surgeon: Rashmi Coles M.D.;  Location: LABOR AND DELIVERY;  Service: Obstetrics    PRIMARY C SECTION  2017    Procedure: PRIMARY C SECTION;  Surgeon: Keke Huntley M.D.;  Location: LABOR AND  "DELIVERY;  Service:     DILATION AND EVACUATION N/A 2016    Procedure: DILATION AND EVACUATION;  Surgeon: Joe Ramirez M.D.;  Location: SURGERY SAME DAY [facility];  Service:      Patient has no known allergies.  Social History     Socioeconomic History    Marital status: Single   Tobacco Use    Smoking status: Former     Current packs/day: 0.00     Average packs/day: 0.3 packs/day for 4.0 years (1.0 ttl pk-yrs)     Types: Cigarettes     Start date: 2013     Quit date: 2017     Years since quittin.9    Smokeless tobacco: Never   Vaping Use    Vaping Use: Never used   Substance and Sexual Activity    Alcohol use: No     Comment: occ    Drug use: No    Sexual activity: Yes     Partners: Male     Family History   Problem Relation Age of Onset    Arthritis Paternal Grandmother     Hypertension Paternal Grandmother      Medications:   Current Outpatient Medications Ordered in Epic   Medication Sig Dispense Refill    metroNIDAZOLE (FLAGYL) 500 MG Tab Take 1 pill 2 times daily for 7 days (Patient not taking: Reported on 2023) 14 Tablet 0     Current Facility-Administered Medications Ordered in Epic   Medication Dose Route Frequency Provider Last Rate Last Admin    medroxyPROGESTERone (Depo-Provera) injection 150 mg  150 mg Intramuscular Once TONJA Cox        medroxyPROGESTERone (DEPO-PROVERA) injection 150 mg  150 mg Intramuscular Q90 DAYS Keke Huntley M.D.   150 mg at 23 1603          Objective:   Vital measurements:  /80 (BP Location: Right arm, Patient Position: Sitting, BP Cuff Size: Adult)   Ht 5' 6\"   Wt 242 lb   Body mass index is 39.06 kg/m². (Goal BM I>18 <25)    Physical Exam   Nursing note and vitals reviewed.    Constitutional: She is oriented to person, place, and time. She appears well-developed and well-nourished. No distress.     HEENT:   Head: Normocephalic and atraumatic.   Right Ear: External ear normal.   Left Ear: External ear normal. "   Nose: Nose normal.   Eyes: Conjunctivae and EOM are normal. Pupils are equal, round, and reactive to light. No scleral icterus.     Neck: Normal range of motion. Neck supple. No tracheal deviation present. No thyromegaly present.     Pulmonary/Chest: Effort normal and breath sounds normal. No respiratory distress. She has no wheezes. She has no rales. She exhibits no tenderness.     Cardiovascular: Regular, rate and rhythm. No JVD.    Abdominal: Soft. Bowel sounds are normal. She exhibits no distension and no mass. No tenderness. She has no rebound and no guarding.     Breast:  Self breast exam recommended     Genitourinary:  Pelvic exam was performed with patient supine.  External genitalia with no abnormal pigmentation, labial fusion,rash, tenderness, lesion or injury to the labia bilaterally.  Vagina is moist with no lesions, foul discharge, erythema, tenderness or bleeding. No foreign body around the vagina or signs of injury.   Cervix exhibits no motion tenderness, no discharge and no friability.     Musculoskeletal: Normal range of motion. She exhibits no edema and no tenderness.     Lymphadenopathy: She has no cervical adenopathy.     Neurological: She is alert and oriented to person, place, and time. She exhibits normal muscle tone.     Skin: Skin is warm and dry. No rash noted. She is not diaphoretic. No erythema. No pallor.     Psychiatric: She has a normal mood and affect. Her behavior is normal. Judgment and thought content normal.      Assessment:     Well woman with gynecological exam     Plan:   Pap and physical exam performed  STI screening via blood also accepted today  Monthly self breast exam education provided  HPV vaccine candidate: she completed it  Depo due today   Pt reports she does not have PCP--> pt desires to do baseline labs with us today   Encourage exercise and proper diet  Mammograms starting @ age 40 annually  Return to clinic: one year or PRN

## 2024-03-14 ENCOUNTER — TELEPHONE (OUTPATIENT)
Dept: OBGYN | Facility: CLINIC | Age: 34
End: 2024-03-14
Payer: MEDICAID

## 2024-03-14 LAB
ALBUMIN SERPL BCP-MCNC: 4.6 G/DL (ref 3.2–4.9)
ALBUMIN/GLOB SERPL: 1.4 G/DL
ALP SERPL-CCNC: 96 U/L (ref 30–99)
ALT SERPL-CCNC: 41 U/L (ref 2–50)
ANION GAP SERPL CALC-SCNC: 12 MMOL/L (ref 7–16)
AST SERPL-CCNC: 29 U/L (ref 12–45)
BILIRUB SERPL-MCNC: 0.4 MG/DL (ref 0.1–1.5)
BUN SERPL-MCNC: 12 MG/DL (ref 8–22)
CALCIUM ALBUM COR SERPL-MCNC: 9.1 MG/DL (ref 8.5–10.5)
CALCIUM SERPL-MCNC: 9.6 MG/DL (ref 8.5–10.5)
CHLORIDE SERPL-SCNC: 106 MMOL/L (ref 96–112)
CHOLEST SERPL-MCNC: 191 MG/DL (ref 100–199)
CO2 SERPL-SCNC: 23 MMOL/L (ref 20–33)
CREAT SERPL-MCNC: 0.74 MG/DL (ref 0.5–1.4)
GFR SERPLBLD CREATININE-BSD FMLA CKD-EPI: 110 ML/MIN/1.73 M 2
GLOBULIN SER CALC-MCNC: 3.3 G/DL (ref 1.9–3.5)
GLUCOSE SERPL-MCNC: 82 MG/DL (ref 65–99)
HDLC SERPL-MCNC: 40 MG/DL
LDLC SERPL CALC-MCNC: 115 MG/DL
POTASSIUM SERPL-SCNC: 4.2 MMOL/L (ref 3.6–5.5)
PROT SERPL-MCNC: 7.9 G/DL (ref 6–8.2)
SODIUM SERPL-SCNC: 141 MMOL/L (ref 135–145)
TRIGL SERPL-MCNC: 178 MG/DL (ref 0–149)

## 2024-03-14 NOTE — TELEPHONE ENCOUNTER
----- Message from TONJA Cox sent at 3/14/2024  7:56 AM PDT -----  Let pt know that her vitamin D is even lower, has she been taking any replacement? And her LDL bad fats are more than doubled and triglycerides remain high, she should follow up with her PCP to see how to improve her cholesterol overall. She needs vitamin D replacement with 800 to 1000 international units (20 to 25 micrograms) daily.    3/14/2024 1121  Called pt and notified as above. Pt is not taking any Vit D supplements at this time. Pt agreed and verbalized understanding.

## 2024-03-20 LAB
C TRACH RRNA CVX QL NAA+PROBE: NEGATIVE
CYTOLOGIST CVX/VAG CYTO: ABNORMAL
CYTOLOGY CVX/VAG DOC CYTO: ABNORMAL
CYTOLOGY CVX/VAG DOC THIN PREP: ABNORMAL
HPV I/H RISK 4 DNA CVX QL PROBE+SIG AMP: POSITIVE
HPV16 DNA CVX QL PROBE+SIG AMP: NEGATIVE
HPV18+45 E6+E7 MRNA CVX QL NAA+PROBE: NEGATIVE
N GONORRHOEA RRNA CVX QL NAA+PROBE: NEGATIVE
NOTE NL11727A: ABNORMAL
OTHER STN SPEC: ABNORMAL
STAT OF ADQ CVX/VAG CYTO-IMP: ABNORMAL

## 2024-03-21 ENCOUNTER — TELEPHONE (OUTPATIENT)
Dept: OBGYN | Facility: CLINIC | Age: 34
End: 2024-03-21
Payer: MEDICAID

## 2024-03-21 NOTE — TELEPHONE ENCOUNTER
----- Message from TONJA Cox sent at 3/21/2024 12:37 PM PDT -----  Let pt know that she needs another pap in one year, HPV positive, normal pap, negative high risk HPV so just need to continue to monitor in one year.     3/21/2024 0194  Called pt and notified as above. Pt verbalized understanding.

## 2024-05-01 ENCOUNTER — APPOINTMENT (OUTPATIENT)
Dept: INTERNAL MEDICINE | Facility: OTHER | Age: 34
End: 2024-05-01
Payer: MEDICAID

## 2024-05-29 ENCOUNTER — NON-PROVIDER VISIT (OUTPATIENT)
Dept: OBGYN | Facility: CLINIC | Age: 34
End: 2024-05-29
Payer: MEDICAID

## 2024-05-29 DIAGNOSIS — Z30.42 ENCOUNTER FOR DEPO-PROVERA CONTRACEPTION: ICD-10-CM

## 2024-05-29 RX ORDER — MEDROXYPROGESTERONE ACETATE 150 MG/ML
150 INJECTION, SUSPENSION INTRAMUSCULAR
Status: SHIPPED | OUTPATIENT
Start: 2024-05-29

## 2024-05-29 RX ORDER — MEDROXYPROGESTERONE ACETATE 150 MG/ML
150 INJECTION, SUSPENSION INTRAMUSCULAR
Status: DISCONTINUED | OUTPATIENT
Start: 2024-05-29 | End: 2024-05-29

## 2024-05-29 RX ADMIN — MEDROXYPROGESTERONE ACETATE 150 MG: 150 INJECTION, SUSPENSION INTRAMUSCULAR at 10:24

## 2024-05-29 NOTE — PROGRESS NOTES
Pt here for nurse visit, Date of last Depo Provera Injection: 3/13/2024  Current date within therapeutic range?: yes  Urine pregnancy test done (needed if out of date range): within range   Date of office visit:3/13/2024  Date of last pap (if > 21 years old)/ GYN exam: 3/14/2024 WNL with + HPV recommendation in a year.   Dx: Contraceptive use    Order and dose verified by: MAR  Patient tolerated injection and no adverse effects were observed or reported: well  # of Administrations remaining in MAR: every 90 days  Next injection due between 8/14/2024 to 8/28/2024

## 2024-06-06 ENCOUNTER — APPOINTMENT (OUTPATIENT)
Dept: INTERNAL MEDICINE | Facility: OTHER | Age: 34
End: 2024-06-06
Payer: MEDICAID

## 2024-06-06 VITALS
WEIGHT: 243.4 LBS | SYSTOLIC BLOOD PRESSURE: 116 MMHG | OXYGEN SATURATION: 95 % | BODY MASS INDEX: 38.2 KG/M2 | TEMPERATURE: 97 F | HEART RATE: 86 BPM | DIASTOLIC BLOOD PRESSURE: 81 MMHG | HEIGHT: 67 IN

## 2024-06-06 DIAGNOSIS — E66.9 OBESITY (BMI 30-39.9): ICD-10-CM

## 2024-06-06 DIAGNOSIS — E55.9 VITAMIN D DEFICIENCY: ICD-10-CM

## 2024-06-06 DIAGNOSIS — E78.5 DYSLIPIDEMIA: ICD-10-CM

## 2024-06-06 PROBLEM — Z86.19 HISTORY OF GONORRHEA: Status: RESOLVED | Noted: 2022-03-23 | Resolved: 2024-06-06

## 2024-06-06 PROBLEM — R87.618 PAP SMEAR ABNORMALITY OF CERVIX/HUMAN PAPILLOMAVIRUS (HPV) POSITIVE: Status: RESOLVED | Noted: 2024-03-13 | Resolved: 2024-06-06

## 2024-06-06 PROCEDURE — 99204 OFFICE O/P NEW MOD 45 MIN: CPT | Mod: GC

## 2024-06-06 RX ORDER — ERGOCALCIFEROL 1.25 MG/1
50000 CAPSULE ORAL
Qty: 12 CAPSULE | Refills: 0 | Status: SHIPPED | OUTPATIENT
Start: 2024-06-06

## 2024-06-06 SDOH — ECONOMIC STABILITY: INCOME INSECURITY: HOW HARD IS IT FOR YOU TO PAY FOR THE VERY BASICS LIKE FOOD, HOUSING, MEDICAL CARE, AND HEATING?: NOT HARD AT ALL

## 2024-06-06 SDOH — ECONOMIC STABILITY: TRANSPORTATION INSECURITY
IN THE PAST 12 MONTHS, HAS THE LACK OF TRANSPORTATION KEPT YOU FROM MEDICAL APPOINTMENTS OR FROM GETTING MEDICATIONS?: NO

## 2024-06-06 SDOH — HEALTH STABILITY: PHYSICAL HEALTH: ON AVERAGE, HOW MANY MINUTES DO YOU ENGAGE IN EXERCISE AT THIS LEVEL?: 30 MIN

## 2024-06-06 SDOH — ECONOMIC STABILITY: FOOD INSECURITY: WITHIN THE PAST 12 MONTHS, YOU WORRIED THAT YOUR FOOD WOULD RUN OUT BEFORE YOU GOT MONEY TO BUY MORE.: NEVER TRUE

## 2024-06-06 SDOH — ECONOMIC STABILITY: FOOD INSECURITY: WITHIN THE PAST 12 MONTHS, THE FOOD YOU BOUGHT JUST DIDN'T LAST AND YOU DIDN'T HAVE MONEY TO GET MORE.: NEVER TRUE

## 2024-06-06 SDOH — ECONOMIC STABILITY: HOUSING INSECURITY
IN THE LAST 12 MONTHS, WAS THERE A TIME WHEN YOU DID NOT HAVE A STEADY PLACE TO SLEEP OR SLEPT IN A SHELTER (INCLUDING NOW)?: NO

## 2024-06-06 SDOH — ECONOMIC STABILITY: TRANSPORTATION INSECURITY
IN THE PAST 12 MONTHS, HAS LACK OF RELIABLE TRANSPORTATION KEPT YOU FROM MEDICAL APPOINTMENTS, MEETINGS, WORK OR FROM GETTING THINGS NEEDED FOR DAILY LIVING?: NO

## 2024-06-06 SDOH — HEALTH STABILITY: MENTAL HEALTH
STRESS IS WHEN SOMEONE FEELS TENSE, NERVOUS, ANXIOUS, OR CAN'T SLEEP AT NIGHT BECAUSE THEIR MIND IS TROUBLED. HOW STRESSED ARE YOU?: ONLY A LITTLE

## 2024-06-06 SDOH — ECONOMIC STABILITY: TRANSPORTATION INSECURITY
IN THE PAST 12 MONTHS, HAS LACK OF TRANSPORTATION KEPT YOU FROM MEETINGS, WORK, OR FROM GETTING THINGS NEEDED FOR DAILY LIVING?: NO

## 2024-06-06 SDOH — ECONOMIC STABILITY: INCOME INSECURITY: IN THE LAST 12 MONTHS, WAS THERE A TIME WHEN YOU WERE NOT ABLE TO PAY THE MORTGAGE OR RENT ON TIME?: NO

## 2024-06-06 SDOH — ECONOMIC STABILITY: HOUSING INSECURITY: IN THE LAST 12 MONTHS, HOW MANY PLACES HAVE YOU LIVED?: 1

## 2024-06-06 SDOH — HEALTH STABILITY: PHYSICAL HEALTH: ON AVERAGE, HOW MANY DAYS PER WEEK DO YOU ENGAGE IN MODERATE TO STRENUOUS EXERCISE (LIKE A BRISK WALK)?: 3 DAYS

## 2024-06-06 ASSESSMENT — SOCIAL DETERMINANTS OF HEALTH (SDOH)
HOW HARD IS IT FOR YOU TO PAY FOR THE VERY BASICS LIKE FOOD, HOUSING, MEDICAL CARE, AND HEATING?: NOT HARD AT ALL
HOW OFTEN DO YOU ATTEND CHURCH OR RELIGIOUS SERVICES?: PATIENT DECLINED
WITHIN THE PAST 12 MONTHS, YOU WORRIED THAT YOUR FOOD WOULD RUN OUT BEFORE YOU GOT THE MONEY TO BUY MORE: NEVER TRUE
HOW MANY DRINKS CONTAINING ALCOHOL DO YOU HAVE ON A TYPICAL DAY WHEN YOU ARE DRINKING: 3 OR 4
ARE YOU MARRIED, WIDOWED, DIVORCED, SEPARATED, NEVER MARRIED, OR LIVING WITH A PARTNER?: NEVER MARRIED
HOW OFTEN DO YOU HAVE SIX OR MORE DRINKS ON ONE OCCASION: PATIENT DECLINED
HOW OFTEN DO YOU ATTEND CHURCH OR RELIGIOUS SERVICES?: PATIENT DECLINED
IN A TYPICAL WEEK, HOW MANY TIMES DO YOU TALK ON THE PHONE WITH FAMILY, FRIENDS, OR NEIGHBORS?: MORE THAN THREE TIMES A WEEK
DO YOU BELONG TO ANY CLUBS OR ORGANIZATIONS SUCH AS CHURCH GROUPS UNIONS, FRATERNAL OR ATHLETIC GROUPS, OR SCHOOL GROUPS?: NO
DO YOU BELONG TO ANY CLUBS OR ORGANIZATIONS SUCH AS CHURCH GROUPS UNIONS, FRATERNAL OR ATHLETIC GROUPS, OR SCHOOL GROUPS?: NO
IN A TYPICAL WEEK, HOW MANY TIMES DO YOU TALK ON THE PHONE WITH FAMILY, FRIENDS, OR NEIGHBORS?: MORE THAN THREE TIMES A WEEK
HOW OFTEN DO YOU HAVE A DRINK CONTAINING ALCOHOL: 2-4 TIMES A MONTH
HOW OFTEN DO YOU GET TOGETHER WITH FRIENDS OR RELATIVES?: MORE THAN THREE TIMES A WEEK
HOW OFTEN DO YOU ATTENT MEETINGS OF THE CLUB OR ORGANIZATION YOU BELONG TO?: PATIENT DECLINED
HOW OFTEN DO YOU ATTENT MEETINGS OF THE CLUB OR ORGANIZATION YOU BELONG TO?: PATIENT DECLINED
ARE YOU MARRIED, WIDOWED, DIVORCED, SEPARATED, NEVER MARRIED, OR LIVING WITH A PARTNER?: NEVER MARRIED
HOW OFTEN DO YOU GET TOGETHER WITH FRIENDS OR RELATIVES?: MORE THAN THREE TIMES A WEEK

## 2024-06-06 ASSESSMENT — ENCOUNTER SYMPTOMS
COUGH: 0
PSYCHIATRIC NEGATIVE: 1
MUSCULOSKELETAL NEGATIVE: 1
CARDIOVASCULAR NEGATIVE: 1
PALPITATIONS: 0
CONSTITUTIONAL NEGATIVE: 1
HEMOPTYSIS: 0
EYES NEGATIVE: 1
NEUROLOGICAL NEGATIVE: 1
GASTROINTESTINAL NEGATIVE: 1

## 2024-06-06 ASSESSMENT — LIFESTYLE VARIABLES
AUDIT-C TOTAL SCORE: -1
HOW OFTEN DO YOU HAVE SIX OR MORE DRINKS ON ONE OCCASION: PATIENT DECLINED
HOW OFTEN DO YOU HAVE A DRINK CONTAINING ALCOHOL: 2-4 TIMES A MONTH
HOW MANY STANDARD DRINKS CONTAINING ALCOHOL DO YOU HAVE ON A TYPICAL DAY: 3 OR 4
SKIP TO QUESTIONS 9-10: 0

## 2024-06-06 ASSESSMENT — PATIENT HEALTH QUESTIONNAIRE - PHQ9: CLINICAL INTERPRETATION OF PHQ2 SCORE: 0

## 2024-06-06 ASSESSMENT — FIBROSIS 4 INDEX: FIB4 SCORE: 0.48

## 2024-06-06 NOTE — PROGRESS NOTES
Chief Complaint   Patient presents with    Establish Care     Pt is concerned about her cholesterol from March labs       HISTORY OF PRESENT ILLNESS: Patient is a pleasant 33 y.o. female patient with past medical history of dyslipidemia , vitamin D deficiency , obesity, who presents today to establish care and to discuss results of her lipid panel.  The following were discussed  as below:    1. Dyslipidemia  Labs ordered on March 2024 by previous PCP reviewed.  Cholesterol of 191, triglycerides of 178, HDL 40, .  Patient with no history of hypertension, no history of diabetes.  She states history of diabetes mellitus type 2 on maternal great grandmother.  She has been working on her diet as well as trying to have regular exercise.    2. Obesity (BMI 30-39.9)  -Patient states that she was not able to go to the gym as originally planned however she will go more frequently and exercise in the succeeding days.  Patient also has been trying to improve more on her diet as above.  She is a non-smoker, very occasional alcoholic beverage drinker.    3. Vitamin D deficiency  -Labs from March 2024 reviewed.  Noted vitamin D of 15, previous vitamin D of 12 and 23 in the past.  Patient states she has always had low vitamin D and she is on combined over-the-counter vitamin D and calcium, unsure of dose.  She states she has occasional fatigue but has improved while on vitamin D supplementation.       Past Medical History:   Diagnosis Date    Elevated triglycerides  03/23/2022    History of gonorrhea 03/23/2022    HPV positive 03/13/2024       Patient Active Problem List    Diagnosis Date Noted    Obesity (BMI 30-39.9) 06/06/2024    Elevated triglycerides  03/23/2022    History of trichomoniasis 03/23/2022    Vitamin D deficiency 06/08/2016       Patient has no known allergies.    Current Outpatient Medications   Medication Sig Dispense Refill    ergocalciferol (DRISDOL) 05486 UNIT capsule Take 1 Capsule by mouth every 7  "days. 12 Capsule 0     Current Facility-Administered Medications   Medication Dose Route Frequency Provider Last Rate Last Admin    medroxyPROGESTERone (Depo-Provera) injection 150 mg  150 mg Intramuscular Q90 DAYS    150 mg at 24 1024    medroxyPROGESTERone (DEPO-PROVERA) injection 150 mg  150 mg Intramuscular Q90 DAYS Keke Huntley M.D.   150 mg at 23 1603       Social History     Tobacco Use    Smoking status: Former     Current packs/day: 0.00     Average packs/day: 0.3 packs/day for 4.0 years (1.0 ttl pk-yrs)     Types: Cigarettes     Start date: 2013     Quit date: 2017     Years since quittin.1    Smokeless tobacco: Never   Vaping Use    Vaping status: Some Days    Substances: Nicotine   Substance Use Topics    Alcohol use: No     Comment: occ    Drug use: No       Family History   Problem Relation Age of Onset    Arthritis Paternal Grandmother     Hypertension Paternal Grandmother         Review of Systems   Constitutional: Negative.    HENT: Negative.     Eyes: Negative.    Respiratory:  Negative for cough and hemoptysis.    Cardiovascular: Negative.  Negative for chest pain and palpitations.   Gastrointestinal: Negative.    Genitourinary: Negative.    Musculoskeletal: Negative.    Skin: Negative.    Neurological: Negative.    Endo/Heme/Allergies: Negative.    Psychiatric/Behavioral: Negative.         Exam:  /81 (BP Location: Left arm, Patient Position: Sitting, BP Cuff Size: Large adult)   Pulse 86   Temp 36.1 °C (97 °F) (Temporal)   Ht 1.71 m (5' 7.32\")   Wt 110 kg (243 lb 6.4 oz)   SpO2 95%  Body mass index is 37.76 kg/m².    Constitutional:  Not in acute distress, well appearing.  HEENT:   NC/AT  Cardiovascular: Regular rate and rhythm. No murmurs or gallops.      Lungs:   Clear to auscultation bilaterally. No wheezes or crackles. No respiratory distress.  Abdomen: Not distended, soft, not tender. No guarding or rigidity. No masses.  Extremities:  No " cyanosis/clubbing/edema. No obvious deformities.  Skin:  Warm and dry.  No visible rashes.  Neurologic: Alert & oriented x 3, CN II-XII grossly intact, strength and sensation grossly intact.  No focal deficits noted.  Psychiatric:  Affect normal, mood normal, judgment normal.    Assessment/Plan:     1. Dyslipidemia  -Repeat lipid panel in 6 months, no indication for statin  - Lipid Profile; Future  -Advised lifestyle modifications     2. Obesity (BMI 30-39.9)  -Discussed importance of healthy diet, regular exercise, lifestyle modifications  - discussed that she will benefit from nutrition referral however patient not currently amenable to nutrition referral as of now,will further discuss at next visit     3. Vitamin D deficiency  -Vitamin D of 15 on recent lab  -Prescribed with high-dose vitamin D 50,000 weekly for 3 months, continue daily vitamin D supplementation afterwards      All imaging results and lab results and consult notes are reviewed at this visit.  Followup: Return in about 6 months (around 12/6/2024).    Please note that this dictation was created using voice recognition software. I have made every reasonable attempt to correct obvious errors, but I expect that there are errors of grammar and possibly content that I did not discover before finalizing the note.    HARIS CAMACHO MD  PGY-2

## 2024-09-03 ENCOUNTER — NON-PROVIDER VISIT (OUTPATIENT)
Dept: OBGYN | Facility: CLINIC | Age: 34
End: 2024-09-03
Payer: MEDICAID

## 2024-09-03 DIAGNOSIS — Z30.42 DEPO-PROVERA CONTRACEPTIVE STATUS: ICD-10-CM

## 2024-09-03 LAB
POCT INT CON NEG: NEGATIVE
POCT INT CON POS: POSITIVE
POCT URINE PREGNANCY TEST: NEGATIVE

## 2024-09-03 PROCEDURE — 81025 URINE PREGNANCY TEST: CPT | Performed by: OBSTETRICS & GYNECOLOGY

## 2024-09-03 PROCEDURE — 96372 THER/PROPH/DIAG INJ SC/IM: CPT | Performed by: OBSTETRICS & GYNECOLOGY

## 2024-09-03 RX ORDER — MEDROXYPROGESTERONE ACETATE 150 MG/ML
150 INJECTION, SUSPENSION INTRAMUSCULAR ONCE
Status: COMPLETED | OUTPATIENT
Start: 2024-09-03 | End: 2024-09-03

## 2024-09-03 RX ADMIN — MEDROXYPROGESTERONE ACETATE 150 MG: 150 INJECTION, SUSPENSION INTRAMUSCULAR at 09:36

## 2024-09-03 ASSESSMENT — FIBROSIS 4 INDEX: FIB4 SCORE: 0.48

## 2024-09-03 NOTE — PROGRESS NOTES
Date of last Depo Provera Injection : 5/29/2024  Current date within therapeutic range? :  no  Urine pregnancy test done (needed if out of date range) : yes, negative  Date of office last visit : 3/13/2024  Date of last pap (if > 21 years old)/ GYN exam : 3/13/2024 +HPV aptima  Dx: Contraceptive use   Order and dose verified by : AP  Consent form signed ? : today   Patient tolerated injection and no adverse effects were observed or reported : yes  Next injection due between : November 19-December 3  Given in : LGlute

## 2024-09-04 VITALS
BODY MASS INDEX: 37.35 KG/M2 | SYSTOLIC BLOOD PRESSURE: 118 MMHG | DIASTOLIC BLOOD PRESSURE: 80 MMHG | HEIGHT: 67 IN | WEIGHT: 238 LBS

## 2024-12-03 ENCOUNTER — APPOINTMENT (OUTPATIENT)
Dept: OBGYN | Facility: CLINIC | Age: 34
End: 2024-12-03
Payer: MEDICAID

## 2024-12-10 ENCOUNTER — NON-PROVIDER VISIT (OUTPATIENT)
Dept: OBGYN | Facility: CLINIC | Age: 34
End: 2024-12-10
Payer: MEDICAID

## 2024-12-10 DIAGNOSIS — Z30.42 ENCOUNTER FOR MANAGEMENT AND INJECTION OF DEPO-PROVERA: ICD-10-CM

## 2024-12-10 LAB
POCT INT CON NEG: NEGATIVE
POCT INT CON POS: POSITIVE
POCT URINE PREGNANCY TEST: NEGATIVE

## 2024-12-10 PROCEDURE — 96372 THER/PROPH/DIAG INJ SC/IM: CPT | Performed by: NURSE PRACTITIONER

## 2024-12-10 PROCEDURE — 81025 URINE PREGNANCY TEST: CPT | Performed by: OBSTETRICS & GYNECOLOGY

## 2024-12-10 RX ORDER — MEDROXYPROGESTERONE ACETATE 150 MG/ML
150 INJECTION, SUSPENSION INTRAMUSCULAR
Status: SHIPPED | OUTPATIENT
Start: 2024-12-10

## 2024-12-10 RX ADMIN — MEDROXYPROGESTERONE ACETATE 150 MG: 150 INJECTION, SUSPENSION INTRAMUSCULAR at 09:59

## 2024-12-10 NOTE — PROGRESS NOTES
Date of last Depo Provera Injection: 9/3/2024 was due 11/19/2024 to 12/3/2024  Current date within therapeutic range?:  no  Urine pregnancy test done (needed if out of date range): negative in clinic   Date of office visit: 3/13/2024  Date of last pap (if > 21 years old)/ GYN exam: 3/13/2024 negative with + HPV  Dx: Contraceptive use   Order and dose verified by:  MAR  Patient tolerated injection and no adverse effects were observed or reported:    # of Administrations remaining in MAR: every 90 days  Next injection due between . 2/25/2025 to 3/11/2025  Per consult with Joelle Whaley C.N.M instructed pt that if she has intercourse to use back up and also to take another pregnancy test in 1 week pt verbalized understanding and will comply.

## 2024-12-10 NOTE — PROGRESS NOTES
Signed off for depo provera injection.  Pt not seen by myself.  Late for injection, has unprotected intercourse last week but no menstrual cycles so difficult to ascertain her ovulation window.  Can still give injection today, repeat pregnancy test at home in 1 week, condoms x 1 week.

## 2024-12-30 ENCOUNTER — APPOINTMENT (OUTPATIENT)
Dept: INTERNAL MEDICINE | Facility: OTHER | Age: 34
End: 2024-12-30
Payer: MEDICAID

## 2025-01-09 ENCOUNTER — APPOINTMENT (OUTPATIENT)
Dept: INTERNAL MEDICINE | Facility: OTHER | Age: 35
End: 2025-01-09
Payer: MEDICAID

## 2025-01-10 ENCOUNTER — APPOINTMENT (OUTPATIENT)
Dept: INTERNAL MEDICINE | Facility: OTHER | Age: 35
End: 2025-01-10
Payer: MEDICAID

## 2025-02-12 ENCOUNTER — APPOINTMENT (OUTPATIENT)
Dept: INTERNAL MEDICINE | Facility: OTHER | Age: 35
End: 2025-02-12
Payer: MEDICAID

## 2025-02-24 ENCOUNTER — NON-PROVIDER VISIT (OUTPATIENT)
Dept: OBGYN | Facility: CLINIC | Age: 35
End: 2025-02-24
Payer: MEDICAID

## 2025-02-24 VITALS — WEIGHT: 226.6 LBS | DIASTOLIC BLOOD PRESSURE: 88 MMHG | SYSTOLIC BLOOD PRESSURE: 118 MMHG | BODY MASS INDEX: 35.49 KG/M2

## 2025-02-24 PROCEDURE — 96372 THER/PROPH/DIAG INJ SC/IM: CPT

## 2025-02-24 RX ADMIN — MEDROXYPROGESTERONE ACETATE 150 MG: 150 INJECTION, SUSPENSION INTRAMUSCULAR at 09:34

## 2025-02-24 ASSESSMENT — FIBROSIS 4 INDEX: FIB4 SCORE: 0.49

## 2025-02-24 NOTE — PROGRESS NOTES
Date of last Depo Provera Injection: 12/10/2024  Current date within therapeutic range?:  yes  Urine pregnancy test done (needed if out of date range): n/a  Date of office visit: 02/24/2025  Date of last pap (if > 21 years old)/ GYN exam: 03/13/2024  Dx: Contraceptive use   Order and dose verified by: scanner  Patient tolerated injection and no adverse effects were observed or reported:  yes  Next injection due between : may 12 th - may 26 th   LG

## 2025-03-18 ENCOUNTER — HOSPITAL ENCOUNTER (OUTPATIENT)
Dept: LAB | Facility: MEDICAL CENTER | Age: 35
End: 2025-03-18
Payer: MEDICAID

## 2025-03-18 DIAGNOSIS — E78.5 DYSLIPIDEMIA: ICD-10-CM

## 2025-03-18 DIAGNOSIS — E66.9 OBESITY (BMI 30-39.9): ICD-10-CM

## 2025-03-18 LAB
CHOLEST SERPL-MCNC: 184 MG/DL (ref 100–199)
HDLC SERPL-MCNC: 53 MG/DL
LDLC SERPL CALC-MCNC: 73 MG/DL
TRIGL SERPL-MCNC: 292 MG/DL (ref 0–149)

## 2025-03-18 PROCEDURE — 36415 COLL VENOUS BLD VENIPUNCTURE: CPT

## 2025-03-18 PROCEDURE — 80061 LIPID PANEL: CPT

## 2025-03-24 ENCOUNTER — APPOINTMENT (OUTPATIENT)
Dept: INTERNAL MEDICINE | Facility: OTHER | Age: 35
End: 2025-03-24
Payer: MEDICAID

## 2025-03-24 VITALS
OXYGEN SATURATION: 95 % | HEART RATE: 94 BPM | BODY MASS INDEX: 35.66 KG/M2 | SYSTOLIC BLOOD PRESSURE: 121 MMHG | DIASTOLIC BLOOD PRESSURE: 72 MMHG | TEMPERATURE: 97.6 F | WEIGHT: 227.2 LBS | HEIGHT: 67 IN

## 2025-03-24 DIAGNOSIS — E78.5 DYSLIPIDEMIA: ICD-10-CM

## 2025-03-24 DIAGNOSIS — Z13.228 ENCOUNTER FOR SCREENING FOR METABOLIC DISORDER: ICD-10-CM

## 2025-03-24 DIAGNOSIS — E55.9 VITAMIN D DEFICIENCY: ICD-10-CM

## 2025-03-24 DIAGNOSIS — Z78.9 ALCOHOL USE: ICD-10-CM

## 2025-03-24 DIAGNOSIS — Z23 ENCOUNTER FOR ADMINISTRATION OF VACCINE: ICD-10-CM

## 2025-03-24 DIAGNOSIS — F41.9 ANXIETY: ICD-10-CM

## 2025-03-24 DIAGNOSIS — L65.9 HAIR LOSS: ICD-10-CM

## 2025-03-24 ASSESSMENT — ENCOUNTER SYMPTOMS
CONSTITUTIONAL NEGATIVE: 1
EYES NEGATIVE: 1
GASTROINTESTINAL NEGATIVE: 1
COUGH: 0
NEUROLOGICAL NEGATIVE: 1
CARDIOVASCULAR NEGATIVE: 1
PALPITATIONS: 0
MUSCULOSKELETAL NEGATIVE: 1
HEMOPTYSIS: 0

## 2025-03-24 ASSESSMENT — FIBROSIS 4 INDEX: FIB4 SCORE: 0.49

## 2025-03-24 ASSESSMENT — PATIENT HEALTH QUESTIONNAIRE - PHQ9: CLINICAL INTERPRETATION OF PHQ2 SCORE: 0

## 2025-03-24 NOTE — PROGRESS NOTES
Chief Complaint   Patient presents with    Follow-Up    Lab Results     In chart       HISTORY OF PRESENT ILLNESS:     Patient is a pleasant 33 y.o. female patient with past medical history of dyslipidemia , vitamin D deficiency , obesity BMI of 35.38, on q3 monthly Depo Provera injection with gynecology.     1. Hair loss  Patient noticed-that she had a patch of hair loss around frontal area of her scalp around January 2025.  Recalls that since November of last year, she has been having increasing stress due to day-to-day life such as being a single mother, working and studying.Denies any history of thyroid disorder, no history of iron deficiency anemia. She does not pull her hair, notes minimal falling of hair during showers and combing her hair. No known history of significant psychiatric disorder    2. Vitamin D deficiency  -Patient with known history of vitamin D deficiency, vitamin D of 15 last March 2024.  During patient's last visit, she was prescribed with ergocalciferol 50,000 units weekly for 3 months and she has been compliant with this.  She is known on daily vitamin vitamin D supplementation    3.Stress/ Anxiety    Alcohol use  -Patient states that because of the increased stress that she has been going through since November 2024, she has been using alcohol more frequently.  Notes about alcohol intake of about 3-4 shots of vodka at night otherwise she denies any history of passing out,  no history of alcohol withdrawals  Patient states that she only uses the alcohol to help calm her mind down and to relax at the end of the day since during the day, she is busy with work, studying as well as taking care of her children.  She otherwise denies any passive or active suicidal ideation.  LOY score of 9.  No specific stressor or anxiety other than the day to day things she needs to accomplish and go through.  Denies any depressive symptoms.  Highly motivated.       Past Medical History:   Diagnosis Date     Elevated triglycerides  2022    History of gonorrhea 2022    HPV positive 2024       Patient Active Problem List    Diagnosis Date Noted    Obesity (BMI 30-39.9) 2024    Elevated triglycerides  2022    History of trichomoniasis 2022    Vitamin D deficiency 2016       Patient has no known allergies.    Current Outpatient Medications   Medication Sig Dispense Refill    ergocalciferol (DRISDOL) 42662 UNIT capsule Take 1 Capsule by mouth every 7 days. (Patient not taking: Reported on 3/24/2025) 12 Capsule 0     Current Facility-Administered Medications   Medication Dose Route Frequency Provider Last Rate Last Admin    medroxyPROGESTERone (Depo-Provera) injection 150 mg  150 mg Intramuscular Q90 DAYS    150 mg at 25 0934    medroxyPROGESTERone (Depo-Provera) injection 150 mg  150 mg Intramuscular Q90 DAYS    150 mg at 24 1024    medroxyPROGESTERone (DEPO-PROVERA) injection 150 mg  150 mg Intramuscular Q90 DAYS Keke Huntley M.D.   150 mg at 23 1603       Social History     Tobacco Use    Smoking status: Former     Current packs/day: 0.00     Average packs/day: 0.3 packs/day for 4.0 years (1.0 ttl pk-yrs)     Types: Cigarettes     Start date: 2013     Quit date: 2017     Years since quittin.9    Smokeless tobacco: Never   Vaping Use    Vaping status: Some Days    Substances: Nicotine   Substance Use Topics    Alcohol use: No     Comment: occ    Drug use: No       Family History   Problem Relation Age of Onset    Arthritis Paternal Grandmother     Hypertension Paternal Grandmother        Review of Systems   Constitutional: Negative.    HENT: Negative.     Eyes: Negative.    Respiratory:  Negative for cough and hemoptysis.    Cardiovascular: Negative.  Negative for chest pain and palpitations.   Gastrointestinal: Negative.    Genitourinary: Negative.    Musculoskeletal: Negative.    Skin: Negative.    Neurological: Negative.    Endo/Heme/Allergies:  "Negative.    Psychiatric/Behavioral:          See HPI       Exam:  /72 (BP Location: Left arm, Patient Position: Sitting, BP Cuff Size: Adult)   Pulse 94   Temp 36.4 °C (97.6 °F) (Temporal)   Ht 1.702 m (5' 7\")   Wt 103 kg (227 lb 3.2 oz)   SpO2 95%  Body mass index is 35.58 kg/m².    Constitutional:  Not in acute distress, well appearing.  HEENT:   NC/AT  Cardiovascular: Regular rate and rhythm. No murmurs or gallops.      Lungs:   Clear to auscultation bilaterally. No wheezes or crackles. No respiratory distress.  Abdomen: Not distended, soft, not tender. No guarding or rigidity. No masses.  Extremities:  No cyanosis/clubbing/edema. No obvious deformities.  Skin:  Negative hair pull test.  Noted focal one round patch of hair loss on frontal area of scalp, otherwise no noted erythema or flaking.  No noted abnormalities in the scalp and in healthy normal distribution.  No other patches of hair loss noted  Neurologic: Alert & oriented x 3, CN II-XII grossly intact, strength and sensation grossly intact.  No focal deficits noted.  Psychiatric:  Affect normal, mood normal, judgment normal.    Assessment/Plan:     1. 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   Findings not suggestive of any female pattern hair loss, not associated with any symptoms suggestive of hyperandrogenism  -Unlikely trichotillomania.    -Rule out other causes, pending:   - CBC WITH DIFFERENTIAL; Future  - VITAMIN B12; Future  - Referral to Dermatology for further recommendations/management    2. Anxiety  -Patient with significant stress daily , has been using alcohol more frequently to help relax her mind  -Discussed to decrease alcohol use and adverse effects of alcohol use. Patient will greatly benefit with cognitive behavioral therapy, referral to Behavioral " Health placed.  Patient amenable and in understanding  -Relaxation exercise and other behavioral modifications    3. Alcohol use  -discussed alcohol cessation,referral to behavioral health as above. It seems patient uses alcohol as coping mechanism for stress/anxiety  - HEPATIC FUNCTION PANEL; Future, rule out alcoholic hepatosteatosis. Otherwise, reassuring physical examination and patient asymptomatic   -Assess need for pharmacotherapy as appropriate on next visit    4. Vitamin D deficiency  -pending repeat vitamin D, completed course of high dose Vitamin D  - VITAMIN D,25 HYDROXY (DEFICIENCY); Future    5. Dyslipidemia  -Lipid panel 3/18/25 reviewed: Cholesterol 184,Triglycerides 292, HDL 53, LDL 73  -discussed strict lifestyle modifications, amenable for nutrition referral however will prefer to do this on next visit since she has a very busy daily schedule    6. Encounter for screening for metabolic disorder  - HEMOGLOBIN A1C; Future    7. Encounter for administration of vaccine  - INFLUENZA VACCINE TRI INJ (PF)    All imaging results and lab results and consult notes are reviewed at this visit.  Followup: Return in about 2 months (around 5/24/2025).    Please note that this dictation was created using voice recognition software. I have made every reasonable attempt to correct obvious errors, but I expect that there are errors of grammar and possibly content that I did not discover before finalizing the note.    HARIS CAMACHO MD  PGY-3

## 2025-03-27 NOTE — Clinical Note
REFERRAL APPROVAL NOTICE         Sent on March 27, 2025                   Kyara Olmstead Robert  [address] [facility] [address]                   Dear MsKim HuertaJones,    After a careful review of the medical information and benefit coverage, [facility] has processed your referral. See below for additional details.    If applicable, you must be actively enrolled with your insurance for coverage of the authorized service. If you have any questions regarding your coverage, please contact your insurance directly.    REFERRAL INFORMATION   Referral #:  [id number]  Referred-To Department    Referred-By Provider:  Behavioral Health    Tammi Zepeda M.D.   Behavioral Health Outpatient      [address] [facility] [address]  [phone] [address] [facility] [address]  [facility] [id number] [phone]    Referral Start Date:  03/24/2025  Referral End Date:   03/24/2026             SCHEDULING  If you do not already have an appointment, please call [phone] to make an appointment.     MORE INFORMATION  If you do not already have a [employer] account, sign up at: [email or web address]  You can access your medical information, make appointments, see lab results, billing information, and more.  If you have questions regarding this referral, please contact  the [facility] Referrals department at:             [phone]. Monday - Friday 8:00AM - 5:00PM.     Sincerely,    [facility]

## 2025-03-27 NOTE — Clinical Note
REFERRAL APPROVAL NOTICE         Sent on March 27, 2025                   Kyara Olmstead Robert  [address] [facility] [address]                   Dear Ms. Patelr,    After a careful review of the medical information and benefit coverage, [facility] has processed your referral. See below for additional details.    If applicable, you must be actively enrolled with your insurance for coverage of the authorized service. If you have any questions regarding your coverage, please contact your insurance directly.    REFERRAL INFORMATION   Referral #:  [id number]  Referred-To Department    Referred-By Provider:  Dermatology    Tammi Zepeda M.D.   [facility]      [address] [facility] [address]  [phone] [address] [facility] [id number] [phone]    Referral Start Date:  03/24/2025  Referral End Date:   03/24/2026             SCHEDULING  If you do not already have an appointment, please call [phone] to make an appointment.     MORE INFORMATION  If you do not already have a [employer] account, sign up at: [email or web address]  You can access your medical information, make appointments, see lab results, billing information, and more.  If you have questions regarding this referral, please contact  the [facility] Referrals department at:             [phone]. Monday - Friday 8:00AM - 5:00PM.     Sincerely,    [facility]

## 2025-04-29 ENCOUNTER — APPOINTMENT (OUTPATIENT)
Dept: LAB | Facility: MEDICAL CENTER | Age: 35
End: 2025-04-29
Payer: MEDICAID

## 2025-05-13 ENCOUNTER — APPOINTMENT (OUTPATIENT)
Dept: INTERNAL MEDICINE | Facility: OTHER | Age: 35
End: 2025-05-13
Payer: MEDICAID

## 2025-05-13 ENCOUNTER — HOSPITAL ENCOUNTER (OUTPATIENT)
Dept: LAB | Facility: MEDICAL CENTER | Age: 35
End: 2025-05-13
Payer: MEDICAID

## 2025-05-13 DIAGNOSIS — L65.9 HAIR LOSS: ICD-10-CM

## 2025-05-13 DIAGNOSIS — E55.9 VITAMIN D DEFICIENCY: ICD-10-CM

## 2025-05-13 DIAGNOSIS — Z13.228 ENCOUNTER FOR SCREENING FOR METABOLIC DISORDER: ICD-10-CM

## 2025-05-13 DIAGNOSIS — F10.90 ALCOHOL USE: ICD-10-CM

## 2025-05-13 LAB
25(OH)D3 SERPL-MCNC: 25 NG/ML (ref 30–100)
ALBUMIN SERPL BCP-MCNC: 4.3 G/DL (ref 3.2–4.9)
ALP SERPL-CCNC: 78 U/L (ref 30–99)
ALT SERPL-CCNC: 66 U/L (ref 2–50)
AST SERPL-CCNC: 47 U/L (ref 12–45)
BASOPHILS # BLD AUTO: 0.6 % (ref 0–1.8)
BASOPHILS # BLD: 0.06 K/UL (ref 0–0.12)
BILIRUB CONJ SERPL-MCNC: 0.4 MG/DL (ref 0.1–0.5)
BILIRUB INDIRECT SERPL-MCNC: 0.6 MG/DL (ref 0–1)
BILIRUB SERPL-MCNC: 1 MG/DL (ref 0.1–1.5)
EOSINOPHIL # BLD AUTO: 0.31 K/UL (ref 0–0.51)
EOSINOPHIL NFR BLD: 3.2 % (ref 0–6.9)
ERYTHROCYTE [DISTWIDTH] IN BLOOD BY AUTOMATED COUNT: 39.6 FL (ref 35.9–50)
EST. AVERAGE GLUCOSE BLD GHB EST-MCNC: 126 MG/DL
HBA1C MFR BLD: 6 % (ref 4–5.6)
HCT VFR BLD AUTO: 44.7 % (ref 37–47)
HGB BLD-MCNC: 14.8 G/DL (ref 12–16)
IMM GRANULOCYTES # BLD AUTO: 0.06 K/UL (ref 0–0.11)
IMM GRANULOCYTES NFR BLD AUTO: 0.6 % (ref 0–0.9)
LYMPHOCYTES # BLD AUTO: 2.52 K/UL (ref 1–4.8)
LYMPHOCYTES NFR BLD: 26.2 % (ref 22–41)
MCH RBC QN AUTO: 31.3 PG (ref 27–33)
MCHC RBC AUTO-ENTMCNC: 33.1 G/DL (ref 32.2–35.5)
MCV RBC AUTO: 94.5 FL (ref 81.4–97.8)
MONOCYTES # BLD AUTO: 0.54 K/UL (ref 0–0.85)
MONOCYTES NFR BLD AUTO: 5.6 % (ref 0–13.4)
NEUTROPHILS # BLD AUTO: 6.13 K/UL (ref 1.82–7.42)
NEUTROPHILS NFR BLD: 63.8 % (ref 44–72)
NRBC # BLD AUTO: 0 K/UL
NRBC BLD-RTO: 0 /100 WBC (ref 0–0.2)
PLATELET # BLD AUTO: 288 K/UL (ref 164–446)
PMV BLD AUTO: 10.1 FL (ref 9–12.9)
PROT SERPL-MCNC: 7.7 G/DL (ref 6–8.2)
RBC # BLD AUTO: 4.73 M/UL (ref 4.2–5.4)
VIT B12 SERPL-MCNC: 508 PG/ML (ref 211–911)
WBC # BLD AUTO: 9.6 K/UL (ref 4.8–10.8)

## 2025-05-13 PROCEDURE — 80076 HEPATIC FUNCTION PANEL: CPT

## 2025-05-13 PROCEDURE — 36415 COLL VENOUS BLD VENIPUNCTURE: CPT

## 2025-05-13 PROCEDURE — 85025 COMPLETE CBC W/AUTO DIFF WBC: CPT

## 2025-05-13 PROCEDURE — 82306 VITAMIN D 25 HYDROXY: CPT

## 2025-05-13 PROCEDURE — 82607 VITAMIN B-12: CPT

## 2025-05-13 PROCEDURE — 83036 HEMOGLOBIN GLYCOSYLATED A1C: CPT

## 2025-05-16 ENCOUNTER — OFFICE VISIT (OUTPATIENT)
Dept: INTERNAL MEDICINE | Facility: OTHER | Age: 35
End: 2025-05-16
Payer: MEDICAID

## 2025-05-16 VITALS
HEIGHT: 67 IN | HEART RATE: 86 BPM | OXYGEN SATURATION: 96 % | SYSTOLIC BLOOD PRESSURE: 121 MMHG | TEMPERATURE: 98 F | BODY MASS INDEX: 35.35 KG/M2 | DIASTOLIC BLOOD PRESSURE: 83 MMHG | WEIGHT: 225.2 LBS

## 2025-05-16 DIAGNOSIS — R73.03 PREDIABETES: ICD-10-CM

## 2025-05-16 DIAGNOSIS — R74.01 TRANSAMINITIS: ICD-10-CM

## 2025-05-16 DIAGNOSIS — E66.9 OBESITY (BMI 30-39.9): ICD-10-CM

## 2025-05-16 PROCEDURE — 99214 OFFICE O/P EST MOD 30 MIN: CPT | Mod: GC

## 2025-05-16 PROCEDURE — 3074F SYST BP LT 130 MM HG: CPT | Mod: GC

## 2025-05-16 PROCEDURE — 3079F DIAST BP 80-89 MM HG: CPT | Mod: GC

## 2025-05-16 RX ORDER — BETAMETHASONE DIPROPIONATE 0.05 %
OINTMENT (GRAM) TOPICAL
Qty: 15 G | Refills: 0 | Status: SHIPPED | OUTPATIENT
Start: 2025-05-16

## 2025-05-16 RX ORDER — ERGOCALCIFEROL 1.25 MG/1
50000 CAPSULE ORAL
Qty: 12 CAPSULE | Refills: 0 | Status: SHIPPED | OUTPATIENT
Start: 2025-05-16

## 2025-05-16 RX ORDER — CLOBETASOL PROPIONATE 0.25 MG/G
CREAM TOPICAL
Qty: 100 G | Refills: 0 | Status: SHIPPED | OUTPATIENT
Start: 2025-05-16 | End: 2025-05-16

## 2025-05-16 ASSESSMENT — ENCOUNTER SYMPTOMS
COUGH: 0
CONSTITUTIONAL NEGATIVE: 1
NEUROLOGICAL NEGATIVE: 1
GASTROINTESTINAL NEGATIVE: 1
ROS SKIN COMMENTS: SEE HPI
PALPITATIONS: 0
CARDIOVASCULAR NEGATIVE: 1
EYES NEGATIVE: 1
MUSCULOSKELETAL NEGATIVE: 1
HEMOPTYSIS: 0

## 2025-05-16 ASSESSMENT — FIBROSIS 4 INDEX: FIB4 SCORE: 0.66

## 2025-05-16 NOTE — PATIENT INSTRUCTIONS
Please make an appointment:   Behavioral Health Outpatient  [facility]  [address] [place] [address] [address]  [place] [address]  Phone: [phone]    Follow up in 2 mos   Problem: Altered Mood, Depressive Behavior  Goal: LTG-Able to verbalize and/or display a decrease in depressive symptoms  Outcome: Ongoing  Pt did not participate in leisure/ cognitive skills group at 1330 despite staff encouragement to attend.

## 2025-05-19 ENCOUNTER — TELEPHONE (OUTPATIENT)
Dept: INTERNAL MEDICINE | Facility: OTHER | Age: 35
End: 2025-05-19
Payer: MEDICAID

## 2025-05-19 NOTE — TELEPHONE ENCOUNTER
DOCUMENTATION OF PAR STATUS:    1. Name of Medication & Dose: Betamethasone Dipropionate 0.05% ointment     2. Name of Prescription Coverage Company & phone #: [place] Medicaid Electronic [place] Form (2017 NCPDP)    3. Date Prior Auth Submitted: 5/19/2025    4. What information was given to obtain insurance decision? OV note and DX code    5. Prior Auth Status? Pending    6. Patient Notified: N\A

## 2025-05-21 ENCOUNTER — HOSPITAL ENCOUNTER (OUTPATIENT)
Dept: LAB | Facility: MEDICAL CENTER | Age: 35
End: 2025-05-21
Payer: MEDICAID

## 2025-05-21 ENCOUNTER — HOSPITAL ENCOUNTER (OUTPATIENT)
Facility: MEDICAL CENTER | Age: 35
End: 2025-05-21
Payer: MEDICAID

## 2025-05-21 ENCOUNTER — GYNECOLOGY VISIT (OUTPATIENT)
Dept: OBGYN | Facility: CLINIC | Age: 35
End: 2025-05-21
Payer: MEDICAID

## 2025-05-21 VITALS
SYSTOLIC BLOOD PRESSURE: 114 MMHG | DIASTOLIC BLOOD PRESSURE: 76 MMHG | HEIGHT: 66 IN | WEIGHT: 220 LBS | BODY MASS INDEX: 35.36 KG/M2

## 2025-05-21 DIAGNOSIS — Z30.42 DEPO-PROVERA CONTRACEPTIVE STATUS: ICD-10-CM

## 2025-05-21 DIAGNOSIS — Z11.3 SCREENING FOR STD (SEXUALLY TRANSMITTED DISEASE): ICD-10-CM

## 2025-05-21 DIAGNOSIS — Z01.419 WOMEN'S ANNUAL ROUTINE GYNECOLOGICAL EXAMINATION: Primary | ICD-10-CM

## 2025-05-21 DIAGNOSIS — Z01.419 WOMEN'S ANNUAL ROUTINE GYNECOLOGICAL EXAMINATION: ICD-10-CM

## 2025-05-21 LAB
HAV IGM SERPL QL IA: NORMAL
HBV CORE IGM SER QL: NORMAL
HBV SURFACE AG SER QL: NORMAL
HCV AB SER QL: NORMAL
HIV 1+2 AB+HIV1 P24 AG SERPL QL IA: NORMAL
T PALLIDUM AB SER QL IA: NORMAL

## 2025-05-21 PROCEDURE — 87389 HIV-1 AG W/HIV-1&-2 AB AG IA: CPT

## 2025-05-21 PROCEDURE — 36415 COLL VENOUS BLD VENIPUNCTURE: CPT

## 2025-05-21 PROCEDURE — 87510 GARDNER VAG DNA DIR PROBE: CPT

## 2025-05-21 PROCEDURE — 86780 TREPONEMA PALLIDUM: CPT

## 2025-05-21 PROCEDURE — 80074 ACUTE HEPATITIS PANEL: CPT

## 2025-05-21 PROCEDURE — 87480 CANDIDA DNA DIR PROBE: CPT

## 2025-05-21 PROCEDURE — 87591 N.GONORRHOEAE DNA AMP PROB: CPT

## 2025-05-21 PROCEDURE — 87660 TRICHOMONAS VAGIN DIR PROBE: CPT

## 2025-05-21 PROCEDURE — 87491 CHLMYD TRACH DNA AMP PROBE: CPT

## 2025-05-21 PROCEDURE — 88142 CYTOPATH C/V THIN LAYER: CPT

## 2025-05-21 RX ORDER — MEDROXYPROGESTERONE ACETATE 150 MG/ML
150 INJECTION, SUSPENSION INTRAMUSCULAR ONCE
Status: COMPLETED | OUTPATIENT
Start: 2025-05-21 | End: 2025-05-21

## 2025-05-21 RX ADMIN — MEDROXYPROGESTERONE ACETATE 150 MG: 150 INJECTION, SUSPENSION INTRAMUSCULAR at 11:40

## 2025-05-21 ASSESSMENT — FIBROSIS 4 INDEX: FIB4 SCORE: 0.66

## 2025-05-21 NOTE — PROGRESS NOTES
ANNUAL GYNECOLOGY VISIT    Chief Complaint  Annual    Subjective  Kyaralesly Jones is a 34 y.o. female  No LMP recorded. She is using Depo for contraception who presents today for Annual Exam.  She is also here for Depo injection. Last injection was 2025 so pt is in range.       Preventive Care   Immunization History   Administered Date(s) Administered    9VHPV VACCINE 2-3 DOSE IM (GARDASIL 9) 2023, 2023, 2023    Covid-19 Mrna (Spikevax) Moderna 12+ Years 2021    DTP - Historical vaccine 1995, 10/12/1995    DTP/HIB Combined Vaccine - HISTORICAL DATA 1994    Dtap Vaccine 1996    Hepatitis A Vaccine, Ped/Adol 2005, 2006    Hepatitis B Vaccine Adolescent/Pediatric 2005, 2006, 2009    Influenza Vaccine Quad Inj (Pf) 2017, 2020    Influenza split virus trivalent (PF) 2025    MMR Vaccine 1994, 1996    MODERNA SARS-COV-2 VACCINE (12+) 2021, 2021, 2021    OPV TRIVALENT - HISTORICAL DATA (GIVEN PRIOR TO MAY 2016) 1994, 1995, 10/12/1995, 1996    Tdap Vaccine 2010, 2017, 09/10/2020       Guardasil HPV vaccine: Completed 3 dose series    Gynecology History and ROS  Current Sexual Activity: yes - one, male  History of sexually transmitted diseases? yes - hx of Chlamydia and Gonorrhea, treated   Abnormal discharge? no  Current Contraception:  Depo    Menstrual History  No LMP recorded.  Intermittent spotting with Depo-Provera closer to when she is due for next injection  Significant Pelvic Pain: no    Pap History  Last pap smear:   2024 HR HPV, NILM  2023 HR HPV, NILM    Cancer Risk Assessement:  Family history of:   - Breast cancer: no   - Ovarian cancer: no   - Uterine cancer: no   - Colon cancer: no    Obstetric History  OB History    Para Term  AB Living   3 2 2 0 1 2   SAB IAB Ectopic Molar Multiple Live Births   1 0 0  0 2     "  # Outcome Date GA Lbr Case/2nd Weight Sex Type Anes PTL Lv   3 Term 10/21/20 39w5d  8 lb 13.1 oz M CS-LTranv Spinal N SRINI   2 Term 04/06/17 39w1d  6 lb 12.1 oz M CS-LTranv Spinal  SRINI      Birth Comments: Primary C/S due to FTP   1 SAB 04/2016 8w0d             Birth Comments: D&C        Past Medical History  Past Medical History[1]    Past Surgical History  Past Surgical History[2]    Social History  Social History[3]     Family History  Family History   Problem Relation Age of Onset    Arthritis Paternal Grandmother     Hypertension Paternal Grandmother        Home Medications  Current Outpatient Medications   Medication Sig    ergocalciferol (DRISDOL) 83601 UNIT capsule Take 1 Capsule by mouth every 7 days.    betamethasone dipropionate (DEL-BETA) 0.05 % Ointment Apply on affected area once or twice a day as directed.  Apply sparingly       Allergies/Reactions  Allergies[4]    ROS  Positive ROS: See HPI   Gen: no fevers or chills, no significant weight loss or gain, excessive fatigue  Respiratory:  no cough or dyspnea  Cardiac:  no chest pain, no palpitations, no syncope  Breast: no breast discharge, pain, lump or skin changes  GI:  no heartburn, no abdominal pain, no nausea or vomiting  Urinary: no dysuria, urgency, frequency, incontinence   Psych: no depression or anxiety  Neuro: no migraines with aura, fainting spells, numbness or tingling  Extremities: no joint pain, persistently swollen ankles, recurrent leg cramps      Physical Examination:  Vital Signs: /76 (BP Location: Right arm, Patient Position: Sitting, BP Cuff Size: Large adult)   Ht 5' 6\"   Wt 220 lb   BMI 35.51 kg/m²       Constitutional: The patient is well developed and well nourished.  Psychiatric: Patient is oriented to time place and person.   Skin: No rash observed.  Neck: Appears symmetric. Thyroid normal size  Respiratory: normal effort  Breast: Inspection reveals no asymetry or nipple discharge, no skin thickening, dimpling or " erythema.  Palpation demonstrates no masses.  Abdomen: Soft, non-tender.  Pelvic Exam:      Vulva: external female genitalia are normal in appearance. No lesions     Urethra - no lesions, no erythema     Vagina: moist, pink, normal ruggae     Cervix: pink, smooth, no lesions, no CMT     Uterus - non-tender, normal size, shape, contour, mobile, anteverted     Ovaries: non-tender, no appreciable masses    Pap Smear performed: Yes    Chaperone Present: Julio C Chan MA  Extremeties: Legs are symmetric and without tenderness. There is no edema present.        Assessment & Plan  Kyara Jones is a 34 y.o. female who presents today for Annual Gyn Exam.     Assessment & Plan  Women's annual routine gynecological examination  Anticipatory guidance given. Encouraged adequate water intake, healthy diet, regular exercise. Educated on Pap smear screening and guidelines for age per ACOG and ASSCP. Discussed safe sex, STI prevention, contraception/family planning. Self breast exam taught.   Orders:    THINPREP PAP W/HPV AND CTNG; Future    Screening for STD (sexually transmitted disease)    Orders:    THINPREP PAP W/HPV AND CTNG; Future    HEPATITIS PANEL ACUTE(4 COMPONENTS); Future    VAGINAL PATHOGENS DNA PANEL; Future    HIV AG/AB COMBO ASSAY SCREENING; Future    T.PALLIDUM AB ANTONIO (SCREENING); Future    Depo-Provera contraceptive status  Depo injection received today. Pt in range from last injection  Orders:    medroxyPROGESTERone (Depo-Provera) injection 150 mg        Return: Annually or PRN    CEDRIC Multani  [facility]            [1]   Past Medical History:  Diagnosis Date    Elevated triglycerides  2022    History of gonorrhea 2022    HPV positive 2024   [2]   Past Surgical History:  Procedure Laterality Date    REPEAT C SECTION  10/21/2020    Procedure:  SECTION, REPEAT;  Surgeon: Rashmi Coles M.D.;  Location: LABOR AND DELIVERY;  Service: Obstetrics     PRIMARY C SECTION  2017    Procedure: PRIMARY C SECTION;  Surgeon: Keke Huntley M.D.;  Location: LABOR AND DELIVERY;  Service:     DILATION AND EVACUATION N/A 2016    Procedure: DILATION AND EVACUATION;  Surgeon: Joe Ramirez M.D.;  Location: SURGERY SAME DAY [facility];  Service:    [3]   Social History  Tobacco Use    Smoking status: Former     Current packs/day: 0.00     Average packs/day: 0.3 packs/day for 4.0 years (1.0 ttl pk-yrs)     Types: Cigarettes     Start date: 2013     Quit date: 2017     Years since quittin.0    Smokeless tobacco: Never   Vaping Use    Vaping status: Every Day    Substances: Nicotine   Substance Use Topics    Alcohol use: Yes     Comment: occ    Drug use: No   [4] No Known Allergies

## 2025-05-21 NOTE — PROGRESS NOTES
Pt here for Annual Exam  Last PAP:3/14/2024 NILM +HPV  LMP:Spotting  BCM:Depo last injection 2/24/2025  Pt States:  Phone/Pharmacy Verified    Date of last Depo Provera Injection : 2/24/2025  Current date within therapeutic range? :  Yes  Urine pregnancy test done (needed if out of date range) : N/a  Date of office last visit : 5/21/2025 Annual   Date of last pap (if > 21 years old)/ GYN exam : 5/21/2025  Dx: Contraceptive use   Order and dose verified by : Scanner  Consent form signed ? : Yes new one signed today   Patient tolerated injection and no adverse effects were observed or reported : Pt tolerated injection   Next injection due between : August 6- August 20   Given in :[facility]

## 2025-05-22 ENCOUNTER — RESULTS FOLLOW-UP (OUTPATIENT)
Dept: OBGYN | Facility: CLINIC | Age: 35
End: 2025-05-22
Payer: MEDICAID

## 2025-05-22 LAB
C TRACH DNA GENITAL QL NAA+PROBE: NEGATIVE
CANDIDA DNA VAG QL PROBE+SIG AMP: NEGATIVE
G VAGINALIS DNA VAG QL PROBE+SIG AMP: POSITIVE
N GONORRHOEA DNA GENITAL QL NAA+PROBE: NEGATIVE
SPECIMEN SOURCE: NORMAL
T VAGINALIS DNA VAG QL PROBE+SIG AMP: NEGATIVE

## 2025-05-22 NOTE — Clinical Note
REFERRAL APPROVAL NOTICE         Sent on May 22, 2025                   Kyara Olmstead Robert  [address] [facility] [address]                   Dear Ms. Huertalar,    After a careful review of the medical information and benefit coverage, [facility] has processed your referral. See below for additional details.    If applicable, you must be actively enrolled with your insurance for coverage of the authorized service. If you have any questions regarding your coverage, please contact your insurance directly.    REFERRAL INFORMATION   Referral #:  [id number]  Referred-To Department    Referred-By Provider:  Tammy Zepeda M.D.   [facility]      [address] [facility] [id number] [phone] [address] [facility] [id number] [phone]    Referral Start Date:  05/16/2025  Referral End Date:   05/16/2026             SCHEDULING  If you do not already have an appointment, please call [phone] to make an appointment.     MORE INFORMATION  If you do not already have a [employer] account, sign up at: [email or web address]  You can access your medical information, make appointments, see lab results, billing information, and more.  If you have questions regarding this referral, please contact  the [facility] Referrals department at:             [phone]. Monday - Friday 8:00AM - 5:00PM.     Sincerely,    [facility]

## 2025-05-28 RX ORDER — METRONIDAZOLE 500 MG/1
500 TABLET ORAL 2 TIMES DAILY
Qty: 14 TABLET | Refills: 0 | Status: SHIPPED | OUTPATIENT
Start: 2025-05-28 | End: 2025-06-04

## 2025-05-28 NOTE — PROGRESS NOTES
+ BV, having abnormal discharge a few days later.     ANNIE Multani.    1 Principal Discharge DX:	Hypertensive urgency

## 2025-06-02 LAB
HPV I/H RISK 1 DNA SPEC QL NAA+PROBE: DETECTED
HPV16 DNA CVX QL PROBE+SIG AMP: NOT DETECTED
HPV18 DNA CVX QL PROBE+SIG AMP: NOT DETECTED
SPECIMEN SOURCE: ABNORMAL
SPECIMEN SOURCE: NORMAL
THINPREP PAP, CYTOLOGY NL11781: NORMAL

## 2025-07-09 ENCOUNTER — HOSPITAL ENCOUNTER (OUTPATIENT)
Facility: MEDICAL CENTER | Age: 35
End: 2025-07-09
Attending: OBSTETRICS & GYNECOLOGY
Payer: MEDICAID

## 2025-07-09 ENCOUNTER — OFFICE VISIT (OUTPATIENT)
Dept: OBGYN | Facility: CLINIC | Age: 35
End: 2025-07-09
Payer: MEDICAID

## 2025-07-09 VITALS — DIASTOLIC BLOOD PRESSURE: 70 MMHG | SYSTOLIC BLOOD PRESSURE: 110 MMHG | BODY MASS INDEX: 35.99 KG/M2 | WEIGHT: 223 LBS

## 2025-07-09 DIAGNOSIS — R87.810 ASCUS WITH POSITIVE HIGH RISK HPV CERVICAL: Primary | ICD-10-CM

## 2025-07-09 DIAGNOSIS — R87.810 ASCUS WITH POSITIVE HIGH RISK HPV CERVICAL: ICD-10-CM

## 2025-07-09 DIAGNOSIS — R87.610 ASCUS WITH POSITIVE HIGH RISK HPV CERVICAL: Primary | ICD-10-CM

## 2025-07-09 DIAGNOSIS — R87.610 ASCUS WITH POSITIVE HIGH RISK HPV CERVICAL: ICD-10-CM

## 2025-07-09 DIAGNOSIS — R87.618 PAP SMEAR ABNORMALITY OF CERVIX/HUMAN PAPILLOMAVIRUS (HPV) POSITIVE: ICD-10-CM

## 2025-07-09 LAB
POCT INT CON NEG: NEGATIVE
POCT INT CON POS: POSITIVE
POCT URINE PREGNANCY TEST: NEGATIVE

## 2025-07-09 PROCEDURE — 88305 TISSUE EXAM BY PATHOLOGIST: CPT | Mod: 26 | Performed by: PATHOLOGY

## 2025-07-09 PROCEDURE — 88342 IMHCHEM/IMCYTCHM 1ST ANTB: CPT | Mod: 26 | Performed by: PATHOLOGY

## 2025-07-09 PROCEDURE — 88305 TISSUE EXAM BY PATHOLOGIST: CPT | Mod: 59 | Performed by: PATHOLOGY

## 2025-07-09 PROCEDURE — 88342 IMHCHEM/IMCYTCHM 1ST ANTB: CPT | Mod: 91 | Performed by: PATHOLOGY

## 2025-07-09 PROCEDURE — 57454 BX/CURETT OF CERVIX W/SCOPE: CPT | Performed by: OBSTETRICS & GYNECOLOGY

## 2025-07-09 PROCEDURE — 81025 URINE PREGNANCY TEST: CPT | Performed by: OBSTETRICS & GYNECOLOGY

## 2025-07-09 ASSESSMENT — FIBROSIS 4 INDEX: FIB4 SCORE: 0.66

## 2025-07-09 NOTE — PROGRESS NOTES
Pt here for colposcopy  ASCUS and HPV +  Pt states no complaints  Good #[phone]   Pharmacy verified.
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
## 2025-07-10 LAB — PATHOLOGY CONSULT NOTE: NORMAL

## 2025-07-25 ENCOUNTER — APPOINTMENT (OUTPATIENT)
Dept: INTERNAL MEDICINE | Facility: OTHER | Age: 35
End: 2025-07-25
Payer: MEDICAID

## 2025-07-30 ENCOUNTER — APPOINTMENT (OUTPATIENT)
Dept: OBGYN | Facility: CLINIC | Age: 35
End: 2025-07-30
Payer: MEDICAID

## 2025-08-15 ENCOUNTER — NON-PROVIDER VISIT (OUTPATIENT)
Dept: OBGYN | Facility: CLINIC | Age: 35
End: 2025-08-15
Payer: MEDICAID

## 2025-08-15 VITALS — WEIGHT: 220.6 LBS | BODY MASS INDEX: 35.61 KG/M2 | SYSTOLIC BLOOD PRESSURE: 120 MMHG | DIASTOLIC BLOOD PRESSURE: 82 MMHG

## 2025-08-15 DIAGNOSIS — Z30.42 ENCOUNTER FOR DEPO-PROVERA CONTRACEPTION: Primary | ICD-10-CM

## 2025-08-15 PROCEDURE — 96372 THER/PROPH/DIAG INJ SC/IM: CPT

## 2025-08-15 RX ORDER — MEDROXYPROGESTERONE ACETATE 150 MG/ML
150 INJECTION, SUSPENSION INTRAMUSCULAR
Status: SHIPPED | OUTPATIENT
Start: 2025-08-15

## 2025-08-15 RX ADMIN — MEDROXYPROGESTERONE ACETATE 150 MG: 150 INJECTION, SUSPENSION INTRAMUSCULAR at 09:38

## 2025-08-15 ASSESSMENT — FIBROSIS 4 INDEX: FIB4 SCORE: 0.66

## 2025-08-27 ENCOUNTER — OFFICE VISIT (OUTPATIENT)
Dept: OBGYN | Facility: CLINIC | Age: 35
End: 2025-08-27
Payer: MEDICAID

## 2025-08-27 VITALS — WEIGHT: 240 LBS | DIASTOLIC BLOOD PRESSURE: 70 MMHG | BODY MASS INDEX: 38.74 KG/M2 | SYSTOLIC BLOOD PRESSURE: 110 MMHG

## 2025-08-27 DIAGNOSIS — D06.9 HIGH GRADE SQUAMOUS INTRAEPITHELIAL LESION (HGSIL), GRADE 3 CIN, ON BIOPSY OF CERVIX: Primary | ICD-10-CM

## 2025-08-27 PROCEDURE — 99212 OFFICE O/P EST SF 10 MIN: CPT | Performed by: OBSTETRICS & GYNECOLOGY

## 2025-08-27 PROCEDURE — 3078F DIAST BP <80 MM HG: CPT | Performed by: OBSTETRICS & GYNECOLOGY

## 2025-08-27 PROCEDURE — 3074F SYST BP LT 130 MM HG: CPT | Performed by: OBSTETRICS & GYNECOLOGY

## 2025-08-27 ASSESSMENT — FIBROSIS 4 INDEX: FIB4 SCORE: 0.66

## (undated) DEVICE — DETERGENT RENUZYME PLUS 10 OZ PACKET (50/BX)

## (undated) DEVICE — SLEEVE, SEQUENTIAL CALF REG

## (undated) DEVICE — SUTURE 2-0 CHROMIC GUT CT-1 27 (36PK/BX)"

## (undated) DEVICE — PAD GROUNDING PRE-JELLED - (50EA/PK)

## (undated) DEVICE — WATER IRRIG. STER. 1500 ML - (9/CA)

## (undated) DEVICE — SET EXTENSION WITH 2 PORTS (48EA/CA) ***PART #2C8610 IS A SUBSTITUTE*****

## (undated) DEVICE — ELECTRODE DUAL RETURN W/ CORD - (50/PK)

## (undated) DEVICE — SUTURE 1 CHROMIC CTX ETHICON - (36PK/BX)

## (undated) DEVICE — TAPE CLOTH MEDIPORE 6 INCH - (12RL/CA)

## (undated) DEVICE — STAPLER SKIN DISP - (6/BX 10BX/CA) VISISTAT

## (undated) DEVICE — TRAY BLADDER CARE W/ 16 FR FOLEY CATHETER STATLOCK  (10/CA)

## (undated) DEVICE — TRAY SPINAL ANESTHESIA NON-SAFETY (10/CA)

## (undated) DEVICE — KIT  I.V. START (100EA/CA)

## (undated) DEVICE — GLOVE BIOGEL SZ 8 SURGICAL PF LTX - (50PR/BX 4BX/CA)

## (undated) DEVICE — CANISTER SUCTION 3000ML MECHANICAL FILTER AUTO SHUTOFF MEDI-VAC NONSTERILE LF DISP  (40EA/CA)

## (undated) DEVICE — SUTURE 0 VICRYL PLUS CT 36 (36PK/BX)"

## (undated) DEVICE — TUBING CLEARLINK DUO-VENT - C-FLO (48EA/CA)

## (undated) DEVICE — CHLORAPREP 26 ML APPLICATOR - ORANGE TINT(25/CA)

## (undated) DEVICE — SODIUM CHL IRRIGATION 0.9% 1000ML (12EA/CA)

## (undated) DEVICE — SUTURE 0 VICRYL PLUS CT-1 - 36 INCH (36/BX)

## (undated) DEVICE — DRESSING INTERCEED ABSORBABLE ADHESION BARRIER TC7 (10EA/CA)

## (undated) DEVICE — PACK ROOM TURNOVER L&D (12/CA)

## (undated) DEVICE — TELFA 8 X 10 BIOSEAL - (50EA/CA)

## (undated) DEVICE — HEAD HOLDER JUNIOR/ADULT

## (undated) DEVICE — BLANKET UNDERBODY FULL ACCES - (5/CA)

## (undated) DEVICE — SUTURE 3-0 VICRYL PLUS CT-1 - 36 INCH (36/BX)

## (undated) DEVICE — PACK C-SECTION (2EA/CA)

## (undated) DEVICE — ADHESIVE DERMABOND HVD MINI (12EA/BX)

## (undated) DEVICE — PENCIL ELECTSURG 10FT HLSTR - WITH BLADE (50EA/CA)

## (undated) DEVICE — CATHETER IV NON-SAFETY 18 GA X 1 1/4 (50/BX 4BX/CA)

## (undated) DEVICE — SHEATH RO 4F 10CM (10EA/BX)

## (undated) DEVICE — SPONGE GAUZESTER 4 X 4 4PLY - (128PK/CA)

## (undated) DEVICE — GLOVE BIOGEL SZ 7 SURGICAL PF LTX - (50PR/BX 4BX/CA)

## (undated) DEVICE — WATER IRRIGATION STERILE 1000ML (12EA/CA)

## (undated) DEVICE — LACTATED RINGERS INJ 1000 ML - (14EA/CA 60CA/PF)